# Patient Record
Sex: FEMALE | Race: WHITE | Employment: FULL TIME | URBAN - METROPOLITAN AREA
[De-identification: names, ages, dates, MRNs, and addresses within clinical notes are randomized per-mention and may not be internally consistent; named-entity substitution may affect disease eponyms.]

---

## 2017-07-17 ENCOUNTER — OFFICE VISIT (OUTPATIENT)
Dept: FAMILY MEDICINE | Facility: CLINIC | Age: 22
End: 2017-07-17
Payer: MEDICAID

## 2017-07-17 VITALS
HEART RATE: 75 BPM | HEIGHT: 66 IN | BODY MASS INDEX: 27.97 KG/M2 | WEIGHT: 174 LBS | SYSTOLIC BLOOD PRESSURE: 123 MMHG | OXYGEN SATURATION: 98 % | TEMPERATURE: 98.6 F | DIASTOLIC BLOOD PRESSURE: 77 MMHG

## 2017-07-17 DIAGNOSIS — R11.0 NAUSEA: ICD-10-CM

## 2017-07-17 DIAGNOSIS — N76.0 BACTERIAL VAGINAL INFECTION: Primary | ICD-10-CM

## 2017-07-17 DIAGNOSIS — B96.89 BACTERIAL VAGINAL INFECTION: Primary | ICD-10-CM

## 2017-07-17 DIAGNOSIS — M54.50 ACUTE BILATERAL LOW BACK PAIN WITHOUT SCIATICA: ICD-10-CM

## 2017-07-17 DIAGNOSIS — N97.9 FEMALE INFERTILITY: ICD-10-CM

## 2017-07-17 LAB
ALBUMIN UR-MCNC: NEGATIVE MG/DL
AMORPH CRY #/AREA URNS HPF: ABNORMAL /HPF
APPEARANCE UR: ABNORMAL
BACTERIA #/AREA URNS HPF: ABNORMAL /HPF
BETA HCG QUAL IFA URINE: NEGATIVE
BILIRUB UR QL STRIP: NEGATIVE
COLOR UR AUTO: YELLOW
GLUCOSE UR STRIP-MCNC: NEGATIVE MG/DL
HGB UR QL STRIP: NEGATIVE
KETONES UR STRIP-MCNC: NEGATIVE MG/DL
LEUKOCYTE ESTERASE UR QL STRIP: NEGATIVE
MICRO REPORT STATUS: ABNORMAL
NITRATE UR QL: NEGATIVE
NON-SQ EPI CELLS #/AREA URNS LPF: ABNORMAL /LPF
PH UR STRIP: 7.5 PH (ref 5–7)
RBC #/AREA URNS AUTO: ABNORMAL /HPF (ref 0–2)
SP GR UR STRIP: 1.02 (ref 1–1.03)
SPECIMEN SOURCE: ABNORMAL
URN SPEC COLLECT METH UR: ABNORMAL
UROBILINOGEN UR STRIP-ACNC: 0.2 EU/DL (ref 0.2–1)
WBC #/AREA URNS AUTO: ABNORMAL /HPF (ref 0–2)
WET PREP SPEC: ABNORMAL

## 2017-07-17 PROCEDURE — 84703 CHORIONIC GONADOTROPIN ASSAY: CPT | Performed by: FAMILY MEDICINE

## 2017-07-17 PROCEDURE — 81001 URINALYSIS AUTO W/SCOPE: CPT | Performed by: FAMILY MEDICINE

## 2017-07-17 PROCEDURE — 87210 SMEAR WET MOUNT SALINE/INK: CPT | Performed by: FAMILY MEDICINE

## 2017-07-17 PROCEDURE — 99213 OFFICE O/P EST LOW 20 MIN: CPT | Performed by: FAMILY MEDICINE

## 2017-07-17 RX ORDER — METRONIDAZOLE 500 MG/1
500 TABLET ORAL 2 TIMES DAILY
Qty: 14 TABLET | Refills: 0 | Status: SHIPPED | OUTPATIENT
Start: 2017-07-17 | End: 2017-09-06

## 2017-07-17 NOTE — PROGRESS NOTES
Subjective: Patient and her partner have been trying to get pregnant, last period was May 12, possibility that she has polycystic ovary problems, last see gynecology to sort that out, wanted to know if she might be pregnant today but she also has a little nausea in the morning, some low back pain, and a vaginal discharge.    Objective: Abdominal exam is normal. No CVAT. Urine pregnancy test is negative and urine looks normal but wet prep is positive for clue cells.    Assessment and plan: Bacterial vaginosis, not sure if it's causing any of the other symptoms but they seem pretty minimal at this point. She is to see gynecology about the infertility issues. She also needs to see a regular practitioner about restarting her ADD medicine.

## 2017-07-17 NOTE — MR AVS SNAPSHOT
After Visit Summary   7/17/2017    Mi Salgado    MRN: [id number]           Patient Information     Date Of Birth          1995        Visit Information        Provider Department      7/17/2017 3:45 PM Guille Garcia MD [facility]        Today's Diagnoses     Bacterial vaginal infection    -  1    Nausea        Acute bilateral low back pain without sciatica        Female infertility           Follow-ups after your visit        Additional Services     OB/GYN REFERRAL       Your provider has referred you to:  [facility]: [facility] [phone]   [email or web address]    Please be aware that coverage of these services is subject to the terms and limitations of your health insurance plan.  Call member services at your health plan with any benefit or coverage questions.      Please bring the following with you to your appointment:    (1) Any X-Rays, CTs or MRIs which have been performed.  Contact the facility where they were done to arrange for  prior to your scheduled appointment.   (2) List of current medications   (3) This referral request   (4) Any documents/labs given to you for this referral                  Your next 10 appointments already scheduled     Aug 21, 2017 10:30 AM CDT   Office Visit with BABITA Abarca CNP   [facility]    [address] [address] [phone]           Bring a current list of meds and any records pertaining to this visit.  For Physicals, please bring immunization records and any forms needing to be filled out.  Please arrive 10 minutes early to complete paperwork.              Who to contact     If you have questions or need follow up information about today's clinic visit or your schedule please contact [facility] directly at [phone].  Normal or non-critical lab and imaging  "results will be communicated to you by MyChart, letter or phone within 4 business days after the clinic has received the results. If you do not hear from us within 7 days, please contact the clinic through [employer] or phone. If you have a critical or abnormal lab result, we will notify you by phone as soon as possible.  Submit refill requests through [employer] or call your pharmacy and they will forward the refill request to us. Please allow 3 business days for your refill to be completed.          Additional Information About Your Visit        [employer] Information     [employer] gives you secure access to your electronic health record. If you see a primary care provider, you can also send messages to your care team and make appointments. If you have questions, please call your primary care clinic.  If you do not have a primary care provider, please call [phone] and they will assist you.        Care EveryWhere ID     This is your Care EveryWhere ID. This could be used by other organizations to access your [facility] medical records  [id number]        Your Vitals Were     Pulse Temperature Height Last Period Pulse Oximetry BMI (Body Mass Index)    75 98.6  F (37  C) (Tympanic) 5' 6\" (1.676 m) 05/12/2017 98% 28.08 kg/m2       Blood Pressure from Last 3 Encounters:   07/17/17 123/77   08/19/15 112/73   08/18/15 123/80    Weight from Last 3 Encounters:   07/17/17 174 lb (78.9 kg)   08/19/15 193 lb (87.5 kg) (97 %)*   08/18/15 190 lb (86.2 kg) (96 %)*     * Growth percentiles are based on CDC 2-20 Years data.              We Performed the Following     *UA reflex to Microscopic and Culture ([place] [facility] (except [place] and [place])     Beta HCG qual IFA urine     OB/GYN REFERRAL     Urine Microscopic     Wet prep          Today's Medication Changes          These changes are accurate as of: 7/17/17  4:44 PM.  If you have any questions, ask your nurse or doctor.               Start taking these medicines.  "       Dose/Directions    metroNIDAZOLE 500 MG tablet   Commonly known as:  FLAGYL   Used for:  Bacterial vaginal infection   Started by:  Guille Garcia MD        Dose:  500 mg   Take 1 tablet (500 mg) by mouth 2 times daily   Quantity:  14 tablet   Refills:  0            Where to get your medicines      These medications were sent to [id number] - [place], [place] - [address]  [address], [facility] [address]     Phone:  [phone]     metroNIDAZOLE 500 MG tablet                Primary Care Provider Office Phone # Fax #    BABITA Abarca [phone]       [facility] [address] [address] [address]        Equal Access to Services     VIRGILIO MAHER : Cony brunoo Soreshma, waaxda luqadaha, qaybta kaalmada aderadhayada, anna marie jensen. So [facility] [phone].    ATENCIÓN: Si habla español, tiene a guillory disposición servicios gratuitos de asistencia lingüística. Llame al [phone].    We comply with applicable federal civil rights laws and [place] laws. We do not discriminate on the basis of race, color, national origin, age, disability sex, sexual orientation or gender identity.            Thank you!     Thank you for choosing [facility]  for your care. Our goal is always to provide you with excellent care. Hearing back from our patients is one way we can continue to improve our services. Please take a few minutes to complete the written survey that you may receive in the mail after your visit with us. Thank you!             Your Updated Medication List - Protect others around you: Learn how to safely use, store and throw away your medicines at www.disposemymeds.org.          This list is accurate as of: 7/17/17  4:44 PM.  Always use your most recent med list.                   Brand Name Dispense Instructions for use Diagnosis    metroNIDAZOLE 500 MG tablet    FLAGYL    14  tablet    Take 1 tablet (500 mg) by mouth 2 times daily    Bacterial vaginal infection

## 2017-09-06 ENCOUNTER — OFFICE VISIT (OUTPATIENT)
Dept: OBGYN | Facility: CLINIC | Age: 22
End: 2017-09-06
Payer: COMMERCIAL

## 2017-09-06 VITALS
WEIGHT: 204 LBS | HEART RATE: 92 BPM | BODY MASS INDEX: 32.93 KG/M2 | DIASTOLIC BLOOD PRESSURE: 70 MMHG | SYSTOLIC BLOOD PRESSURE: 124 MMHG

## 2017-09-06 DIAGNOSIS — N97.0 ANOVULATION: Primary | ICD-10-CM

## 2017-09-06 PROCEDURE — 99214 OFFICE O/P EST MOD 30 MIN: CPT | Performed by: OBSTETRICS & GYNECOLOGY

## 2017-09-06 RX ORDER — CLOMIPHENE CITRATE 50 MG/1
TABLET ORAL
Qty: 5 TABLET | Refills: 6 | Status: SHIPPED | OUTPATIENT
Start: 2017-09-06 | End: 2018-08-27

## 2017-09-06 RX ORDER — BUPROPION HYDROCHLORIDE 150 MG/1
150 TABLET, EXTENDED RELEASE ORAL
COMMUNITY
Start: 2015-04-29 | End: 2017-12-18

## 2017-09-06 NOTE — MR AVS SNAPSHOT
After Visit Summary   9/6/2017    Mi Salgado    MRN: [id number]           Patient Information     Date Of Birth          1995        Visit Information        Provider Department      9/6/2017 2:30 PM Kevin Cagle MD [facility]        Today's Diagnoses     Anovulation    -  1       Follow-ups after your visit        Future tests that were ordered for you today     Open Future Orders        Priority Expected Expires Ordered    US OB > 14 Weeks Complete Single Routine  9/6/2018 9/6/2017            Who to contact     If you have questions or need follow up information about today's clinic visit or your schedule please contact [facility] directly at [phone].  Normal or non-critical lab and imaging results will be communicated to you by MyChart, letter or phone within 4 business days after the clinic has received the results. If you do not hear from us within 7 days, please contact the clinic through [employer] or phone. If you have a critical or abnormal lab result, we will notify you by phone as soon as possible.  Submit refill requests through [employer] or call your pharmacy and they will forward the refill request to us. Please allow 3 business days for your refill to be completed.          Additional Information About Your Visit        MyChart Information     [employer] gives you secure access to your electronic health record. If you see a primary care provider, you can also send messages to your care team and make appointments. If you have questions, please call your primary care clinic.  If you do not have a primary care provider, please call [phone] and they will assist you.        Care EveryWhere ID     This is your Care EveryWhere ID. This could be used by other organizations to access your [facility] medical records  [id number]        Your Vitals Were     Pulse Last Period BMI (Body Mass Index)             92 08/12/2017 (Exact Date) 32.93 kg/m2           Blood Pressure from Last 3 Encounters:   09/06/17 124/70   07/17/17 123/77   08/19/15 112/73    Weight from Last 3 Encounters:   09/06/17 204 lb (92.5 kg)   07/17/17 174 lb (78.9 kg)   08/19/15 193 lb (87.5 kg) (97 %)*     * Growth percentiles are based on [facility][note 3] 2-20 Years data.              We Performed the Following     Anti-Mullerian hormone     Prolactin     TSH with free T4 reflex          Today's Medication Changes          These changes are accurate as of: 9/6/17  3:45 PM.  If you have any questions, ask your nurse or doctor.               Start taking these medicines.        Dose/Directions    clomiPHENE 50 MG tablet   Commonly known as:  CLOMID   Used for:  Anovulation   Started by:  Kevin Cagle MD        Take one pill a day; day 5-9 of cycle   Quantity:  5 tablet   Refills:  6            Where to get your medicines      These medications were sent to [facility] [address] [place] [address] & [address] [address]    Hours:  24-hours Phone:  [phone]     clomiPHENE 50 MG tablet                Primary Care Provider Office Phone # Fax #    Angelita Ornelas FlacoterriBABITA [facility] [phone] [address] [address] [address]        Equal Access to Services     VIRGILIO MAHER AH: Hadii darlene brandt hadasho Soomaali, waaxda luqadaha, qaybta kaalmada adeegyada, anna marie noriega . So [facility] [phone].    ATENCIÓN: Si habla español, tiene a guillory disposición servicios gratuitos de asistencia lingüística. Llame al [phone].    We comply with applicable federal civil rights laws and [place] laws. We do not discriminate on the basis of race, color, national origin, age, disability sex, sexual orientation or gender identity.            Thank you!     Thank you for choosing [facility]  for your care. Our goal is always to provide you with excellent care. Hearing back from our  patients is one way we can continue to improve our services. Please take a few minutes to complete the written survey that you may receive in the mail after your visit with us. Thank you!             Your Updated Medication List - Protect others around you: Learn how to safely use, store and throw away your medicines at www.disposemymeds.org.          This list is accurate as of: 9/6/17  3:45 PM.  Always use your most recent med list.                   Brand Name Dispense Instructions for use Diagnosis    buPROPion 150 MG 12 hr tablet    WELLBUTRIN SR     Take 150 mg by mouth        clomiPHENE 50 MG tablet    CLOMID    5 tablet    Take one pill a day; day 5-9 of cycle    Anovulation

## 2017-09-06 NOTE — PROGRESS NOTES
SUBJECTIVE:  Mi Salgado is an 22 year old  P0 woman who presents for discussion of becoming pregnant      -menses not regular; 36-50 days      -never regular      -no history of PID, GC, chlamydia      -no history of pelvic surgery      -no breast secretions        Significant other; achieved pregnancy with other partner      -no semen analysis/similar age to patient    Attempting pregnancy for two years; no positive ovulation test    Past Medical History:   Diagnosis Date     Genital HSV     [facility]     Oppositional defiant disorder of childhood or adolescence      Past Surgical History:   Procedure Laterality Date     NO HISTORY OF SURGERY       Current Outpatient Prescriptions   Medication     buPROPion (WELLBUTRIN SR) 150 MG 12 hr tablet     clomiPHENE (CLOMID) 50 MG tablet     No current facility-administered medications for this visit.      No Known Allergies  Social History   Substance Use Topics     Smoking status: Current Every Day Smoker     Packs/day: 0.25     Types: Cigarettes     Last attempt to quit: 3/16/2011     Smokeless tobacco: Never Used     Alcohol use No       Review of Systems  CONSTITUTIONAL:NEGATIVE  EYES: NEGATIVE  ENT/MOUTH: NEGATIVE  RESP: NEGATIVE  CV: NEGATIVE  GI: NEGATIVE  : see above    OBJECTIVE:  /70 (BP Location: Right arm, Patient Position: Chair, Cuff Size: Adult Regular)  Pulse 92  Wt 204 lb (92.5 kg)  LMP 2017 (Exact Date)  BMI 32.93 kg/m2   EXAM:  GENERAL APPEARANCE: healthy, alert and no distress  ABDOMEN: soft, nontender, without hepatosplenomegaly or masses        ASSESSMENT:  Primary infertility; likely anovulation     -recommend thyroid, prolactin and anti-Mullerian hormone     -recommend ultrasound  If above normal then begin Clomid     -use, risks (ovarian hyperstimulation, multiples risk doubled) reviewed  Differential diagnosis of infertility reviewed; including ovulatory dysfunction,  tubal factor, male factor      30 minutes spent reviewing above    PLAN:  (N97.0) Anovulation  (primary encounter diagnosis)  Plan: clomiPHENE (CLOMID) 50 MG tablet, TSH with free        T4 reflex, Prolactin, Anti-Mullerian hormone,         US OB > 14 Weeks Complete Single               Health Maintenance   Topic Date Due     CHLAMYDIA SCREENING  08/10/2016     PAP SCREENING Q3 YR (SYSTEM ASSIGNED)  06/28/2017     INFLUENZA VACCINE (SYSTEM ASSIGNED)  09/01/2017     TETANUS IMMUNIZATION (SYSTEM ASSIGNED)  08/12/2018     PEDS DTAP/TDAP (7 - Td) 08/12/2018     HPV IMMUNIZATION  Completed

## 2017-09-06 NOTE — NURSING NOTE
"Chief Complaint   Patient presents with     Consult     trying to conceive x's 2 years - irregular menses       Initial /70 (BP Location: Right arm, Patient Position: Chair, Cuff Size: Adult Regular)  Pulse 92  Wt 204 lb (92.5 kg)  LMP 08/12/2017 (Exact Date)  BMI 32.93 kg/m2 Estimated body mass index is 32.93 kg/(m^2) as calculated from the following:    Height as of 7/17/17: 5' 6\" (1.676 m).    Weight as of this encounter: 204 lb (92.5 kg).  Medication Reconciliation: complete     Nurse assisted visit.  Mishel Guzman MA.      "

## 2017-09-07 DIAGNOSIS — N97.0 ANOVULATION: ICD-10-CM

## 2017-09-07 PROCEDURE — 84443 ASSAY THYROID STIM HORMONE: CPT | Performed by: OBSTETRICS & GYNECOLOGY

## 2017-09-07 PROCEDURE — 83520 IMMUNOASSAY QUANT NOS NONAB: CPT | Mod: 90 | Performed by: OBSTETRICS & GYNECOLOGY

## 2017-09-07 PROCEDURE — 99000 SPECIMEN HANDLING OFFICE-LAB: CPT | Performed by: OBSTETRICS & GYNECOLOGY

## 2017-09-07 PROCEDURE — 36415 COLL VENOUS BLD VENIPUNCTURE: CPT | Performed by: OBSTETRICS & GYNECOLOGY

## 2017-09-07 PROCEDURE — 84146 ASSAY OF PROLACTIN: CPT | Performed by: OBSTETRICS & GYNECOLOGY

## 2017-09-08 LAB
PROLACTIN SERPL-MCNC: 22 UG/L (ref 3–27)
TSH SERPL DL<=0.005 MIU/L-ACNC: 0.78 MU/L (ref 0.4–4)

## 2017-09-10 ENCOUNTER — MYC MEDICAL ADVICE (OUTPATIENT)
Dept: OBGYN | Facility: CLINIC | Age: 22
End: 2017-09-10

## 2017-09-11 LAB — MIS SERPL-MCNC: 7.09 NG/ML (ref 0.4–16.02)

## 2017-09-11 NOTE — TELEPHONE ENCOUNTER
Pt called to schedule pelvic u/s.  Does pt need ultrasound on certain day?  Scheduled for Friday Sept 22 at 330 (cycle day 14).    Ani Kimbrough R.N.  [facility]

## 2017-09-25 ENCOUNTER — MYC MEDICAL ADVICE (OUTPATIENT)
Dept: OBGYN | Facility: CLINIC | Age: 22
End: 2017-09-25

## 2017-09-26 ENCOUNTER — HOSPITAL ENCOUNTER (OUTPATIENT)
Dept: ULTRASOUND IMAGING | Facility: CLINIC | Age: 22
Discharge: HOME | End: 2017-09-26
Attending: OBSTETRICS & GYNECOLOGY | Admitting: OBSTETRICS & GYNECOLOGY
Payer: COMMERCIAL

## 2017-09-26 DIAGNOSIS — N97.0 ANOVULATION: ICD-10-CM

## 2017-09-26 PROCEDURE — 76830 TRANSVAGINAL US NON-OB: CPT

## 2017-09-26 NOTE — TELEPHONE ENCOUNTER
Called pt, no answer.    Pt calls back, relay MD would like US tomorrow. Pt unable to scheduled RI US availability tomorrow at 1:15 d/t work. 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 Gave radiology scheduling phone number to call.

## 2017-09-27 ENCOUNTER — MYC MEDICAL ADVICE (OUTPATIENT)
Dept: OBGYN | Facility: CLINIC | Age: 22
End: 2017-09-27

## 2017-09-27 PROBLEM — E28.2 PCOS (POLYCYSTIC OVARIAN SYNDROME): Status: ACTIVE | Noted: 2017-09-27

## 2017-09-27 NOTE — TELEPHONE ENCOUNTER
Spoke with patient and ultrasound results explained.  If continues to have questions regarding ultrasound and recommendations recommend a follow up appt/Dr Cagle.  Michelle Guy RN

## 2017-10-09 ENCOUNTER — MYC MEDICAL ADVICE (OUTPATIENT)
Dept: OBGYN | Facility: CLINIC | Age: 22
End: 2017-10-09

## 2017-10-29 ENCOUNTER — HEALTH MAINTENANCE LETTER (OUTPATIENT)
Age: 22
End: 2017-10-29

## 2017-11-10 ENCOUNTER — MYC MEDICAL ADVICE (OUTPATIENT)
Dept: OBGYN | Facility: CLINIC | Age: 22
End: 2017-11-10

## 2017-11-10 DIAGNOSIS — E28.2 PCOS (POLYCYSTIC OVARIAN SYNDROME): Primary | ICD-10-CM

## 2017-11-10 RX ORDER — CLOMIPHENE CITRATE 50 MG/1
100 TABLET ORAL DAILY
Qty: 10 TABLET | Refills: 6 | Status: SHIPPED | OUTPATIENT
Start: 2017-11-10 | End: 2018-08-27

## 2017-12-13 ENCOUNTER — MYC MEDICAL ADVICE (OUTPATIENT)
Dept: PEDIATRICS | Facility: CLINIC | Age: 22
End: 2017-12-13

## 2017-12-18 ENCOUNTER — OFFICE VISIT (OUTPATIENT)
Dept: PEDIATRICS | Facility: CLINIC | Age: 22
End: 2017-12-18
Payer: COMMERCIAL

## 2017-12-18 VITALS
HEIGHT: 67 IN | HEART RATE: 76 BPM | TEMPERATURE: 98 F | BODY MASS INDEX: 33.27 KG/M2 | SYSTOLIC BLOOD PRESSURE: 127 MMHG | DIASTOLIC BLOOD PRESSURE: 78 MMHG | WEIGHT: 212 LBS | OXYGEN SATURATION: 98 %

## 2017-12-18 DIAGNOSIS — F41.9 ANXIETY: ICD-10-CM

## 2017-12-18 DIAGNOSIS — F33.2 SEVERE EPISODE OF RECURRENT MAJOR DEPRESSIVE DISORDER, WITHOUT PSYCHOTIC FEATURES (H): ICD-10-CM

## 2017-12-18 DIAGNOSIS — F90.0 ATTENTION DEFICIT HYPERACTIVITY DISORDER (ADHD), PREDOMINANTLY INATTENTIVE TYPE: Primary | ICD-10-CM

## 2017-12-18 PROBLEM — E78.5 HYPERLIPIDEMIA WITH TARGET LDL LESS THAN 160: Status: ACTIVE | Noted: 2017-12-18

## 2017-12-18 PROCEDURE — 99214 OFFICE O/P EST MOD 30 MIN: CPT | Performed by: NURSE PRACTITIONER

## 2017-12-18 RX ORDER — LISDEXAMFETAMINE DIMESYLATE 50 MG/1
50 CAPSULE ORAL EVERY MORNING
Qty: 30 CAPSULE | Refills: 0 | Status: SHIPPED | OUTPATIENT
Start: 2017-12-18 | End: 2018-05-09

## 2017-12-18 RX ORDER — ESCITALOPRAM OXALATE 10 MG/1
10 TABLET ORAL DAILY
Qty: 30 TABLET | Refills: 1 | Status: SHIPPED | OUTPATIENT
Start: 2017-12-18 | End: 2018-08-27

## 2017-12-18 ASSESSMENT — ANXIETY QUESTIONNAIRES
3. WORRYING TOO MUCH ABOUT DIFFERENT THINGS: NEARLY EVERY DAY
5. BEING SO RESTLESS THAT IT IS HARD TO SIT STILL: MORE THAN HALF THE DAYS
2. NOT BEING ABLE TO STOP OR CONTROL WORRYING: NEARLY EVERY DAY
GAD7 TOTAL SCORE: 18
6. BECOMING EASILY ANNOYED OR IRRITABLE: NEARLY EVERY DAY
1. FEELING NERVOUS, ANXIOUS, OR ON EDGE: NEARLY EVERY DAY
7. FEELING AFRAID AS IF SOMETHING AWFUL MIGHT HAPPEN: NEARLY EVERY DAY

## 2017-12-18 ASSESSMENT — PATIENT HEALTH QUESTIONNAIRE - PHQ9
SUM OF ALL RESPONSES TO PHQ QUESTIONS 1-9: 15
5. POOR APPETITE OR OVEREATING: SEVERAL DAYS

## 2017-12-18 NOTE — NURSING NOTE
"Chief Complaint   Patient presents with     Recheck Medication       Initial /78  Pulse 76  Temp 98  F (36.7  C) (Oral)  Ht 5' 7\" (1.702 m)  Wt 212 lb (96.2 kg)  SpO2 98%  BMI 33.2 kg/m2 Estimated body mass index is 33.2 kg/(m^2) as calculated from the following:    Height as of this encounter: 5' 7\" (1.702 m).    Weight as of this encounter: 212 lb (96.2 kg).  Medication Reconciliation: complete    Cassandra Chacon MA  "

## 2017-12-18 NOTE — LETTER
December 18, 2017      Mi Salgado  [address] [place] [address]        To Whom It May Concern:    Mi Salgado was seen in our clinic. She may return to work without restrictions.      Sincerely,        Angelita Goldstein, SIDDHARTHA, APRN CNP

## 2017-12-18 NOTE — MR AVS SNAPSHOT
After Visit Summary   12/18/2017    Mi Salgado    MRN: [id number]           Patient Information     Date Of Birth          1995        Visit Information        Provider Department      12/18/2017 2:50 PM Angelita Goldstein APRN CNP [facility]        Today's Diagnoses     Attention deficit hyperactivity disorder (ADHD), predominantly inattentive type    -  1    Anxiety        Moderate major depression (H)          Care Instructions    [facility]- Pediatric Department    If you have any questions regarding to your visit please contact:   Team Isatu:   Clinic Hours Telephone Number   BABITA Arreguin, HEIDI Blunt PA-C, MS    Em Mathews, ZULEIKA Ji,    7am - 7pm Mon - Thurs 7am - 5pm Fri [phone]    After hours and weekends, call [phone]   To make an appointment at any location anytime, please call [phone] or  [email or web address].   Pediatric Walk-in Clinic* 8:30am - 3pm  Mon- Fri    [facility]   8:00am - 7pm  Mon- Thurs  8:00am - 5:30 pm Friday  9am - 1pm Saturday [phone]   Urgent Care - [facility]       11pm-9pm Monday - Friday   9am-5pm Saturday - Sunday    5pm-9pm Monday - Friday  9am-5pm Saturday - Sunday [phone] - [place]      [phone] - [place]   *Pediatric Walk-In Clinic is available for children/adolescents age 0-21 for the following symptoms:  Cough/Cold symptoms   Rashes/Itchy Skin  Sore throat    Urinary tract infection  Diarrhea    Ringworm  Ear pain    Sinus infection  Fever     Pink eye       If your provider has ordered a CT, MRI, or ultrasound for you, please call to schedule:  [place] [facility], phone [phone], fax [phone]  [facility] radiology, [phone]    If you need a medication refill please contact your pharmacy.   Please allow 3  "business days for your refills to be completed.  **For ADHD medication, patient will need a follow up clinic or Evisit at least every 3 months to obtain refills.**    Use [employer] (secure email communication and access to your chart) to send your primary care provider a message or make an appointment.  Ask someone on your Team how to sign up for [employer] or call the [employer] help line at [phone]  To view your child's test results online: Log into your own [employer] account, select your child's name from the tabs on the right hand side, select \"My medical record\" and select \"Test results\"  Do you have options for a visit without coming into the clinic?  [employer] offers electronic visits (E-visits) and telephone visits for certain medical concerns as well as Zipnosis online.    E-visits via [employer]- generally incur a $35.00 fee.   Telephone visits- These are billed based on time spent (in 10-minute increments) on the phone with your provider.   5-10 minutes $30.00 fee   11-20 minutes $59.00 fee   21-30 minutes $85.00 fee  Zipnosis- $25.00 fee.  More information and link available on [employer] homepage.       Lisdexamfetamine Oral Capsule  Brand Name: Vyvanse  What is this medicine?  LISDEXAMFETAMINE (lis DEX am fet a meen) is used to treat attention-deficit hyperactivity disorder (ADHD) in adults and children. It is also used to treat binge-eating disorder in adults. Federal law prohibits giving this medicine to any person other than the person for whom it was prescribed. Do not share this medicine with anyone else.  How should I use this medicine?  Take this medicine by mouth. Follow the directions on the prescription label. Swallow the capsules with a drink of water. You may open capsule and add to a glass of water, then drink right away. Take your doses at regular intervals. Do not take your medicine more often than directed. Do not suddenly stop your medicine. You must gradually reduce the dose or you may " feel withdrawal effects. Ask your doctor or health care professional for advice.  A special MedGuide will be given to you by the pharmacist with each prescription and refill. Be sure to read this information carefully each time.  Talk to your pediatrician regarding the use of this medicine in children. While this drug may be prescribed for children as young as 6 years of age for selected conditions, precautions do apply.  What side effects may I notice from receiving this medicine?  Side effects that you should report to your doctor or health care professional as soon as possible:    allergic reactions like skin rash, itching or hives, swelling of the face, lips, or tongue    changes in vision    chest pain or chest tightness    confusion, trouble speaking or understanding    fast, irregular heartbeat    fingers or toes feel numb, cool, painful    hallucination, loss of contact with reality    high blood pressure    males: prolonged or painful erection    seizures    severe headaches    shortness of breath    suicidal thoughts or other mood changes    trouble walking, dizziness, loss of balance or coordination    uncontrollable head, mouth, neck, arm, or leg movements  Side effects that usually do not require medical attention (report to your doctor or health care professional if they continue or are bothersome):    anxious    headache    loss of appetite    nausea, vomiting    trouble sleeping    weight loss  What may interact with this medicine?  Do not take this medicine with any of the following medications:    MAOIs like Carbex, Eldepryl, Marplan, Nardil, and Parnate    other stimulant medicines for attention disorders, weight loss, or to stay awake  This medicine may also interact with the following medications:    acetazolamide    ammonium chloride    antacids    ascorbic acid    atomoxetine    caffeine    certain medicines for blood pressure    certain medicines for depression, anxiety, or psychotic  disturbances    certain medicines for seizures like carbamazepine, phenobarbital, phenytoin    certain medicines for stomach problems like cimetidine, famotidine, omeprazole, lansoprazole    cold or allergy medicines    green tea    levodopa    linezolid    medicines for sleep during surgery    methenamine    norepinephrine    phenothiazines like chlorpromazine, mesoridazine, prochlorperazine, thioridazine    propoxyphene    sodium acid phosphate    sodium bicarbonate  What if I miss a dose?  If you miss a dose, take it as soon as you can. If it is almost time for your next dose, take only that dose. Do not take double or extra doses.  Where should I keep my medicine?  Keep out of the reach of children. This medicine can be abused. Keep your medicine in a safe place to protect it from theft. Do not share this medicine with anyone. Selling or giving away this medicine is dangerous and against the law.  Store at room temperature between 15 and 30 degrees C (59 and 86 degrees F). Protect from light. Keep container tightly closed. Throw away any unused medicine after the expiration date.  What should I tell my health care provider before I take this medicine?  They need to know if you have any of these conditions:    anxiety or panic attacks    circulation problems in fingers and toes    glaucoma    hardening or blockages of the arteries or heart blood vessels    heart disease or a heart defect    high blood pressure    history of a drug or alcohol abuse problem    history of stroke    kidney disease    liver disease    mental illness    seizures    suicidal thoughts, plans, or attempt; a previous suicide attempt by you or a family member    thyroid disease    Tourette's syndrome    an unusual or allergic reaction to lisdexamfetamine, other medicines, foods, dyes, or preservatives    pregnant or trying to get pregnant    breast-feeding  What should I watch for while using this medicine?  Visit your doctor for regular  check ups. This prescription requires that you follow special procedures with your doctor and pharmacy. You will need to have a new written prescription from your doctor every time you need a refill.  This medicine may affect your concentration, or hide signs of tiredness. Until you know how this medicine affects you, do not drive, ride a bicycle, use machinery, or do anything that needs mental alertness.  Tell your doctor or health care professional if this medicine loses its effects, or if you feel you need to take more than the prescribed amount. Do not change your dose without talking to your doctor or health care professional.  Decreased appetite is a common side effect when starting this medicine. Eating small, frequent meals or snacks can help. Talk to your doctor if you continue to have poor eating habits. Height and weight growth of a child taking this medicine will be monitored closely.  Do not take this medicine close to bedtime. It may prevent you from sleeping.  If you are going to need surgery, a MRI, CT scan, or other procedure, tell your doctor that you are taking this medicine. You may need to stop taking this medicine before the procedure.  Tell your doctor or healthcare professional right away if you notice unexplained wounds on your fingers and toes while taking this medicine. You should also tell your healthcare provider if you experience numbness or pain, changes in the skin color, or sensitivity to temperature in your fingers or toes.  NOTE:This sheet is a summary. It may not cover all possible information. If you have questions about this medicine, talk to your doctor, pharmacist, or health care provider. Copyright  2017 [employer]        Patient Education    Escitalopram Oral solution    Escitalopram Oral tablet  Escitalopram Oral tablet  What is this medicine?  ESCITALOPRAM (es sye DULCE oh pram) is used to treat depression and certain types of anxiety.  This medicine may be used for other  purposes; ask your health care provider or pharmacist if you have questions.  What should I tell my health care provider before I take this medicine?  They need to know if you have any of these conditions:    bipolar disorder or a family history of bipolar disorder    diabetes    glaucoma    heart disease    kidney or liver disease    receiving electroconvulsive therapy    seizures (convulsions)    suicidal thoughts, plans, or attempt by you or a family member    an unusual or allergic reaction to escitalopram, the related drug citalopram, other medicines, foods, dyes, or preservatives    pregnant or trying to become pregnant    breast-feeding  How should I use this medicine?  Take this medicine by mouth with a glass of water. Follow the directions on the prescription label. You can take it with or without food. If it upsets your stomach, take it with food. Take your medicine at regular intervals. Do not take it more often than directed. Do not stop taking this medicine suddenly except upon the advice of your doctor. Stopping this medicine too quickly may cause serious side effects or your condition may worsen.  A special MedGuide will be given to you by the pharmacist with each prescription and refill. Be sure to read this information carefully each time.  Talk to your pediatrician regarding the use of this medicine in children. Special care may be needed.  Overdosage: If you think you have taken too much of this medicine contact a poison control center or emergency room at once.  NOTE: This medicine is only for you. Do not share this medicine with others.  What if I miss a dose?  If you miss a dose, take it as soon as you can. If it is almost time for your next dose, take only that dose. Do not take double or extra doses.  What may interact with this medicine?  Do not take this medicine with any of the following medications:    certain medicines for fungal infections like fluconazole, itraconazole, ketoconazole,  posaconazole, voriconazole    cisapride    citalopram    dofetilide    dronedarone    linezolid    MAOIs like Carbex, Eldepryl, Marplan, Nardil, and Parnate    methylene blue (injected into a vein)    pimozide    thioridazine    ziprasidone  This medicine may also interact with the following medications:    alcohol    aspirin and aspirin-like medicines    carbamazepine    certain medicines for depression, anxiety, or psychotic disturbances    certain medicines for migraine headache like almotriptan, eletriptan, frovatriptan, naratriptan, rizatriptan, sumatriptan, zolmitriptan    certain medicines for sleep    certain medicines that treat or prevent blood clots like warfarin, enoxaparin, dalteparin    cimetidine    diuretics    fentanyl    furazolidone    isoniazid    lithium    metoprolol    NSAIDs, medicines for pain and inflammation, like ibuprofen or naproxen    other medicines that prolong the QT interval (cause an abnormal heart rhythm)    procarbazine    rasagiline    supplements like Verndale's wort, kava kava, valerian    tramadol    tryptophan  This list may not describe all possible interactions. Give your health care provider a list of all the medicines, herbs, non-prescription drugs, or dietary supplements you use. Also tell them if you smoke, drink alcohol, or use illegal drugs. Some items may interact with your medicine.  What should I watch for while using this medicine?  Tell your doctor if your symptoms do not get better or if they get worse. Visit your doctor or health care professional for regular checks on your progress. Because it may take several weeks to see the full effects of this medicine, it is important to continue your treatment as prescribed by your doctor.  Patients and their families should watch out for new or worsening thoughts of suicide or depression. Also watch out for sudden changes in feelings such as feeling anxious, agitated, panicky, irritable, hostile, aggressive,  impulsive, severely restless, overly excited and hyperactive, or not being able to sleep. If this happens, especially at the beginning of treatment or after a change in dose, call your health care professional.  You may get drowsy or dizzy. Do not drive, use machinery, or do anything that needs mental alertness until you know how this medicine affects you. Do not stand or sit up quickly, especially if you are an older patient. This reduces the risk of dizzy or fainting spells. Alcohol may interfere with the effect of this medicine. Avoid alcoholic drinks.  Your mouth may get dry. Chewing sugarless gum or sucking hard candy, and drinking plenty of water may help. Contact your doctor if the problem does not go away or is severe.  What side effects may I notice from receiving this medicine?  Side effects that you should report to your doctor or health care professional as soon as possible:    allergic reactions like skin rash, itching or hives, swelling of the face, lips, or tongue    confusion    feeling faint or lightheaded, falls    fast talking and excited feelings or actions that are out of control    hallucination, loss of contact with reality    seizures    suicidal thoughts or other mood changes    unusual bleeding or bruising  Side effects that usually do not require medical attention (report to your doctor or health care professional if they continue or are bothersome):    blurred vision    changes in appetite    change in sex drive or performance    headache    increased sweating    nausea  This list may not describe all possible side effects. Call your doctor for medical advice about side effects. You may report side effects to FDA at 9-516-FDA-8511.  Where should I keep my medicine?  Keep out of reach of children.  Store at room temperature between 15 and 30 degrees C (59 and 86 degrees F). Throw away any unused medicine after the expiration date.  NOTE:This sheet is a summary. It may not cover all  "possible information. If you have questions about this medicine, talk to your doctor, pharmacist, or health care provider. Copyright  2016 [employer]                Follow-ups after your visit        Your next 10 appointments already scheduled     Dec 22, 2017  1:00 PM CST   SHORT with Cephas Mawuena Agbeh, MD   [facility] [place] ([facility] [place])    [address]  [place] [address] [phone]              Who to contact     If you have questions or need follow up information about today's clinic visit or your schedule please contact [facility] directly at [phone].  Normal or non-critical lab and imaging results will be communicated to you by MyChart, letter or phone within 4 business days after the clinic has received the results. If you do not hear from us within 7 days, please contact the clinic through [employer] or phone. If you have a critical or abnormal lab result, we will notify you by phone as soon as possible.  Submit refill requests through [employer] or call your pharmacy and they will forward the refill request to us. Please allow 3 business days for your refill to be completed.          Additional Information About Your Visit        [employer] Information     [employer] gives you secure access to your electronic health record. If you see a primary care provider, you can also send messages to your care team and make appointments. If you have questions, please call your primary care clinic.  If you do not have a primary care provider, please call [phone] and they will assist you.        Care EveryWhere ID     This is your Care EveryWhere ID. This could be used by other organizations to access your [place] medical records  [id number]        Your Vitals Were     Pulse Temperature Height Last Period Pulse Oximetry BMI (Body Mass Index)    76 98  F (36.7  C) (Oral) 5' 7\" (1.702 m) 12/11/2017 98% 33.2 kg/m2       Blood Pressure from Last 3 Encounters: "   12/18/17 127/78   09/06/17 124/70   07/17/17 123/77    Weight from Last 3 Encounters:   12/18/17 212 lb (96.2 kg)   09/06/17 204 lb (92.5 kg)   07/17/17 174 lb (78.9 kg)              Today, you had the following     No orders found for display         Today's Medication Changes          These changes are accurate as of: 12/18/17  3:40 PM.  If you have any questions, ask your nurse or doctor.               Start taking these medicines.        Dose/Directions    escitalopram 10 MG tablet   Commonly known as:  LEXAPRO   Used for:  Anxiety, Moderate major depression (H)   Started by:  Angelita Goldstein APRN CNP        Dose:  10 mg   Take 1 tablet (10 mg) by mouth daily   Quantity:  30 tablet   Refills:  1       lisdexamfetamine 50 MG capsule   Commonly known as:  VYVANSE   Used for:  Attention deficit hyperactivity disorder (ADHD), predominantly inattentive type   Started by:  Angelita Goldstein APRN CNP        Dose:  50 mg   Take 1 capsule (50 mg) by mouth every morning   Quantity:  30 capsule   Refills:  0            Where to get your medicines      These medications were sent to [facility] [address] [place] [address] & [address] [address]    Hours:  24-hours Phone:  [phone]     escitalopram 10 MG tablet         Some of these will need a paper prescription and others can be bought over the counter.  Ask your nurse if you have questions.     Bring a paper prescription for each of these medications     lisdexamfetamine 50 MG capsule                Primary Care Provider Office Phone # Fax #    BABITA Abarca [phone] [address] [address] [facility] [address]        Equal Access to Services     VIRGILIO MAHER : Cony Pérez, waaxda luqadaha, qaybta kaalmada jesúsyamariam, anna marie jensen. So [facility] [phone].    ATENCIÓN: Gracie dugan  español, tiene a guillory disposición servicios gratuitos de asistencia lingüística. Olinda blue [phone].    We comply with applicable federal civil rights laws and [place] laws. We do not discriminate on the basis of race, color, national origin, age, disability, sex, sexual orientation, or gender identity.            Thank you!     Thank you for choosing [facility]  for your care. Our goal is always to provide you with excellent care. Hearing back from our patients is one way we can continue to improve our services. Please take a few minutes to complete the written survey that you may receive in the mail after your visit with us. Thank you!             Your Updated Medication List - Protect others around you: Learn how to safely use, store and throw away your medicines at www.disposemymeds.org.          This list is accurate as of: 12/18/17  3:40 PM.  Always use your most recent med list.                   Brand Name Dispense Instructions for use Diagnosis    * clomiPHENE 50 MG tablet    CLOMID    5 tablet    Take one pill a day; day 5-9 of cycle    Anovulation       * clomiPHENE 50 MG tablet    CLOMID    10 tablet    Take 2 tablets (100 mg) by mouth daily    PCOS (polycystic ovarian syndrome)       escitalopram 10 MG tablet    LEXAPRO    30 tablet    Take 1 tablet (10 mg) by mouth daily    Anxiety, Moderate major depression (H)       lisdexamfetamine 50 MG capsule    VYVANSE    30 capsule    Take 1 capsule (50 mg) by mouth every morning    Attention deficit hyperactivity disorder (ADHD), predominantly inattentive type       * Notice:  This list has 2 medication(s) that are the same as other medications prescribed for you. Read the directions carefully, and ask your doctor or other care provider to review them with you.

## 2017-12-18 NOTE — PATIENT INSTRUCTIONS
[facility]- Pediatric Department    If you have any questions regarding to your visit please contact:   Team Isatu:   Clinic Hours Telephone Number   BABITA Arreguin, HEIDI Blunt PA-C, ZULEIKA Leung,    7am - 7pm Mon - Thurs  7am - 5pm Fri [phone]    After hours and weekends, call [phone]   To make an appointment at any location anytime, please call [phone] or  [employer].   Pediatric Walk-in Clinic* 8:30am - 3pm  Mon- Fri    [facility]   8:00am - 7pm  Mon- Thurs  8:00am - 5:30 pm Friday  9am - 1pm Saturday [phone]   Urgent Care - [place]      Urgent [facility] - [place]       11pm-9pm Monday - Friday   9am-5pm Saturday - Sunday    5pm-9pm Monday - Friday  9am-5pm Saturday - Sunday [phone] - [place]      [phone] - [place]   *Pediatric Walk-In Clinic is available for children/adolescents age 0-21 for the following symptoms:  Cough/Cold symptoms   Rashes/Itchy Skin  Sore throat    Urinary tract infection  Diarrhea    Ringworm  Ear pain    Sinus infection  Fever     Pink eye       If your provider has ordered a CT, MRI, or ultrasound for you, please call to schedule:  [facility], phone [phone], fax [phone]  [facility] radiology, [phone]    If you need a medication refill please contact your pharmacy.   Please allow 3 business days for your refills to be completed.  **For ADHD medication, patient will need a follow up clinic or Evisit at least every 3 months to obtain refills.**    Use T[employer] (secure email communication and access to your chart) to send your primary care provider a message or make an appointment.  Ask someone on your Team how to sign up for Tweek[employer]oo or call the Tweekaboo help line at [phone]  To view your child's test results online: Log into your own Tweek[employer]oo account, select your  "child's name from the tabs on the right hand side, select \"My medical record\" and select \"Test results\"  Do you have options for a visit without coming into the clinic?  [employer] offers electronic visits (E-visits) and telephone visits for certain medical concerns as well as Zipnosis online.    E-visits via OpenRoute- generally incur a $35.00 fee.   Telephone visits- These are billed based on time spent (in 10-minute increments) on the phone with your provider.   5-10 minutes $30.00 fee   11-20 minutes $59.00 fee   21-30 minutes $85.00 fee  Zipnosis- $25.00 fee.  More information and link available on [email or web address] homepage.       Lisdexamfetamine Oral Capsule  Brand Name: Vyvanse  What is this medicine?  LISDEXAMFETAMINE (lis DEX am fet a meen) is used to treat attention-deficit hyperactivity disorder (ADHD) in adults and children. It is also used to treat binge-eating disorder in adults. Federal law prohibits giving this medicine to any person other than the person for whom it was prescribed. Do not share this medicine with anyone else.  How should I use this medicine?  Take this medicine by mouth. Follow the directions on the prescription label. Swallow the capsules with a drink of water. You may open capsule and add to a glass of water, then drink right away. Take your doses at regular intervals. Do not take your medicine more often than directed. Do not suddenly stop your medicine. You must gradually reduce the dose or you may feel withdrawal effects. Ask your doctor or health care professional for advice.  A special MedGuide will be given to you by the pharmacist with each prescription and refill. Be sure to read this information carefully each time.  Talk to your pediatrician regarding the use of this medicine in children. While this drug may be prescribed for children as young as 6 years of age for selected conditions, precautions do apply.  What side effects may I notice from receiving this medicine?  Side " effects that you should report to your doctor or health care professional as soon as possible:    allergic reactions like skin rash, itching or hives, swelling of the face, lips, or tongue    changes in vision    chest pain or chest tightness    confusion, trouble speaking or understanding    fast, irregular heartbeat    fingers or toes feel numb, cool, painful    hallucination, loss of contact with reality    high blood pressure    males: prolonged or painful erection    seizures    severe headaches    shortness of breath    suicidal thoughts or other mood changes    trouble walking, dizziness, loss of balance or coordination    uncontrollable head, mouth, neck, arm, or leg movements  Side effects that usually do not require medical attention (report to your doctor or health care professional if they continue or are bothersome):    anxious    headache    loss of appetite    nausea, vomiting    trouble sleeping    weight loss  What may interact with this medicine?  Do not take this medicine with any of the following medications:    MAOIs like Carbex, Eldepryl, Marplan, Nardil, and Parnate    other stimulant medicines for attention disorders, weight loss, or to stay awake  This medicine may also interact with the following medications:    acetazolamide    ammonium chloride    antacids    ascorbic acid    atomoxetine    caffeine    certain medicines for blood pressure    certain medicines for depression, anxiety, or psychotic disturbances    certain medicines for seizures like carbamazepine, phenobarbital, phenytoin    certain medicines for stomach problems like cimetidine, famotidine, omeprazole, lansoprazole    cold or allergy medicines    green tea    levodopa    linezolid    medicines for sleep during surgery    methenamine    norepinephrine    phenothiazines like chlorpromazine, mesoridazine, prochlorperazine, thioridazine    propoxyphene    sodium acid phosphate    sodium bicarbonate  What if I miss a dose?  If  you miss a dose, take it as soon as you can. If it is almost time for your next dose, take only that dose. Do not take double or extra doses.  Where should I keep my medicine?  Keep out of the reach of children. This medicine can be abused. Keep your medicine in a safe place to protect it from theft. Do not share this medicine with anyone. Selling or giving away this medicine is dangerous and against the law.  Store at room temperature between 15 and 30 degrees C (59 and 86 degrees F). Protect from light. Keep container tightly closed. Throw away any unused medicine after the expiration date.  What should I tell my health care provider before I take this medicine?  They need to know if you have any of these conditions:    anxiety or panic attacks    circulation problems in fingers and toes    glaucoma    hardening or blockages of the arteries or heart blood vessels    heart disease or a heart defect    high blood pressure    history of a drug or alcohol abuse problem    history of stroke    kidney disease    liver disease    mental illness    seizures    suicidal thoughts, plans, or attempt; a previous suicide attempt by you or a family member    thyroid disease    Tourette's syndrome    an unusual or allergic reaction to lisdexamfetamine, other medicines, foods, dyes, or preservatives    pregnant or trying to get pregnant    breast-feeding  What should I watch for while using this medicine?  Visit your doctor for regular check ups. This prescription requires that you follow special procedures with your doctor and pharmacy. You will need to have a new written prescription from your doctor every time you need a refill.  This medicine may affect your concentration, or hide signs of tiredness. Until you know how this medicine affects you, do not drive, ride a bicycle, use machinery, or do anything that needs mental alertness.  Tell your doctor or health care professional if this medicine loses its effects, or if you  feel you need to take more than the prescribed amount. Do not change your dose without talking to your doctor or health care professional.  Decreased appetite is a common side effect when starting this medicine. Eating small, frequent meals or snacks can help. Talk to your doctor if you continue to have poor eating habits. Height and weight growth of a child taking this medicine will be monitored closely.  Do not take this medicine close to bedtime. It may prevent you from sleeping.  If you are going to need surgery, a MRI, CT scan, or other procedure, tell your doctor that you are taking this medicine. You may need to stop taking this medicine before the procedure.  Tell your doctor or healthcare professional right away if you notice unexplained wounds on your fingers and toes while taking this medicine. You should also tell your healthcare provider if you experience numbness or pain, changes in the skin color, or sensitivity to temperature in your fingers or toes.  NOTE:This sheet is a summary. It may not cover all possible information. If you have questions about this medicine, talk to your doctor, pharmacist, or health care provider. Copyright  2017 [email or web address]        Patient Education    Escitalopram Oral solution    Escitalopram Oral tablet  Escitalopram Oral tablet  What is this medicine?  ESCITALOPRAM (es sye DULCE oh pram) is used to treat depression and certain types of anxiety.  This medicine may be used for other purposes; ask your health care provider or pharmacist if you have questions.  What should I tell my health care provider before I take this medicine?  They need to know if you have any of these conditions:    bipolar disorder or a family history of bipolar disorder    diabetes    glaucoma    heart disease    kidney or liver disease    receiving electroconvulsive therapy    seizures (convulsions)    suicidal thoughts, plans, or attempt by you or a family member    an unusual or allergic reaction to  escitalopram, the related drug citalopram, other medicines, foods, dyes, or preservatives    pregnant or trying to become pregnant    breast-feeding  How should I use this medicine?  Take this medicine by mouth with a glass of water. Follow the directions on the prescription label. You can take it with or without food. If it upsets your stomach, take it with food. Take your medicine at regular intervals. Do not take it more often than directed. Do not stop taking this medicine suddenly except upon the advice of your doctor. Stopping this medicine too quickly may cause serious side effects or your condition may worsen.  A special MedGuide will be given to you by the pharmacist with each prescription and refill. Be sure to read this information carefully each time.  Talk to your pediatrician regarding the use of this medicine in children. Special care may be needed.  Overdosage: If you think you have taken too much of this medicine contact a poison control center or emergency room at once.  NOTE: This medicine is only for you. Do not share this medicine with others.  What if I miss a dose?  If you miss a dose, take it as soon as you can. If it is almost time for your next dose, take only that dose. Do not take double or extra doses.  What may interact with this medicine?  Do not take this medicine with any of the following medications:    certain medicines for fungal infections like fluconazole, itraconazole, ketoconazole, posaconazole, voriconazole    cisapride    citalopram    dofetilide    dronedarone    linezolid    MAOIs like Carbex, Eldepryl, Marplan, Nardil, and Parnate    methylene blue (injected into a vein)    pimozide    thioridazine    ziprasidone  This medicine may also interact with the following medications:    alcohol    aspirin and aspirin-like medicines    carbamazepine    certain medicines for depression, anxiety, or psychotic disturbances    certain medicines for migraine headache like almotriptan,  eletriptan, frovatriptan, naratriptan, rizatriptan, sumatriptan, zolmitriptan    certain medicines for sleep    certain medicines that treat or prevent blood clots like warfarin, enoxaparin, dalteparin    cimetidine    diuretics    fentanyl    furazolidone    isoniazid    lithium    metoprolol    NSAIDs, medicines for pain and inflammation, like ibuprofen or naproxen    other medicines that prolong the QT interval (cause an abnormal heart rhythm)    procarbazine    rasagiline    supplements like [place]'s wort, kava kava, valerian    tramadol    tryptophan  This list may not describe all possible interactions. Give your health care provider a list of all the medicines, herbs, non-prescription drugs, or dietary supplements you use. Also tell them if you smoke, drink alcohol, or use illegal drugs. Some items may interact with your medicine.  What should I watch for while using this medicine?  Tell your doctor if your symptoms do not get better or if they get worse. Visit your doctor or health care professional for regular checks on your progress. Because it may take several weeks to see the full effects of this medicine, it is important to continue your treatment as prescribed by your doctor.  Patients and their families should watch out for new or worsening thoughts of suicide or depression. Also watch out for sudden changes in feelings such as feeling anxious, agitated, panicky, irritable, hostile, aggressive, impulsive, severely restless, overly excited and hyperactive, or not being able to sleep. If this happens, especially at the beginning of treatment or after a change in dose, call your health care professional.  You may get drowsy or dizzy. Do not drive, use machinery, or do anything that needs mental alertness until you know how this medicine affects you. Do not stand or sit up quickly, especially if you are an older patient. This reduces the risk of dizzy or fainting spells. Alcohol may interfere with the  effect of this medicine. Avoid alcoholic drinks.  Your mouth may get dry. Chewing sugarless gum or sucking hard candy, and drinking plenty of water may help. Contact your doctor if the problem does not go away or is severe.  What side effects may I notice from receiving this medicine?  Side effects that you should report to your doctor or health care professional as soon as possible:    allergic reactions like skin rash, itching or hives, swelling of the face, lips, or tongue    confusion    feeling faint or lightheaded, falls    fast talking and excited feelings or actions that are out of control    hallucination, loss of contact with reality    seizures    suicidal thoughts or other mood changes    unusual bleeding or bruising  Side effects that usually do not require medical attention (report to your doctor or health care professional if they continue or are bothersome):    blurred vision    changes in appetite    change in sex drive or performance    headache    increased sweating    nausea  This list may not describe all possible side effects. Call your doctor for medical advice about side effects. You may report side effects to FDA at 7-089-MCD-7491.  Where should I keep my medicine?  Keep out of reach of children.  Store at room temperature between 15 and 30 degrees C (59 and 86 degrees F). Throw away any unused medicine after the expiration date.  NOTE:This sheet is a summary. It may not cover all possible information. If you have questions about this medicine, talk to your doctor, pharmacist, or health care provider. Copyright  2016 [employer]
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
## 2017-12-19 ASSESSMENT — ANXIETY QUESTIONNAIRES: GAD7 TOTAL SCORE: 18

## 2018-01-03 ENCOUNTER — MYC MEDICAL ADVICE (OUTPATIENT)
Dept: PEDIATRICS | Facility: CLINIC | Age: 23
End: 2018-01-03

## 2018-01-03 DIAGNOSIS — F90.0 ATTENTION DEFICIT HYPERACTIVITY DISORDER (ADHD), PREDOMINANTLY INATTENTIVE TYPE: Primary | ICD-10-CM

## 2018-01-03 RX ORDER — LISDEXAMFETAMINE DIMESYLATE 70 MG/1
70 CAPSULE ORAL EVERY MORNING
Qty: 30 CAPSULE | Refills: 0 | Status: SHIPPED | OUTPATIENT
Start: 2018-01-03 | End: 2018-08-27

## 2018-01-18 ENCOUNTER — OFFICE VISIT (OUTPATIENT)
Dept: PEDIATRICS | Facility: CLINIC | Age: 23
End: 2018-01-18
Payer: COMMERCIAL

## 2018-01-18 VITALS
DIASTOLIC BLOOD PRESSURE: 81 MMHG | WEIGHT: 208 LBS | OXYGEN SATURATION: 98 % | RESPIRATION RATE: 24 BRPM | SYSTOLIC BLOOD PRESSURE: 133 MMHG | BODY MASS INDEX: 32.58 KG/M2 | HEART RATE: 91 BPM | TEMPERATURE: 97.4 F

## 2018-01-18 DIAGNOSIS — F33.2 SEVERE EPISODE OF RECURRENT MAJOR DEPRESSIVE DISORDER, WITHOUT PSYCHOTIC FEATURES (H): ICD-10-CM

## 2018-01-18 DIAGNOSIS — F41.0 PANIC ATTACK: ICD-10-CM

## 2018-01-18 DIAGNOSIS — E28.2 PCOS (POLYCYSTIC OVARIAN SYNDROME): ICD-10-CM

## 2018-01-18 DIAGNOSIS — F41.9 ANXIETY: ICD-10-CM

## 2018-01-18 DIAGNOSIS — F90.0 ATTENTION DEFICIT HYPERACTIVITY DISORDER (ADHD), PREDOMINANTLY INATTENTIVE TYPE: Primary | ICD-10-CM

## 2018-01-18 PROCEDURE — 99214 OFFICE O/P EST MOD 30 MIN: CPT | Performed by: NURSE PRACTITIONER

## 2018-01-18 RX ORDER — LISDEXAMFETAMINE DIMESYLATE 70 MG/1
70 CAPSULE ORAL EVERY MORNING
Qty: 30 CAPSULE | Refills: 0 | Status: SHIPPED | OUTPATIENT
Start: 2018-02-03 | End: 2018-06-14

## 2018-01-18 RX ORDER — LISDEXAMFETAMINE DIMESYLATE 70 MG/1
70 CAPSULE ORAL EVERY MORNING
Qty: 30 CAPSULE | Refills: 0 | Status: SHIPPED | OUTPATIENT
Start: 2018-03-03 | End: 2018-05-09

## 2018-01-18 RX ORDER — LORAZEPAM 0.5 MG/1
0.5 TABLET ORAL EVERY 6 HOURS PRN
Qty: 20 TABLET | Refills: 1 | Status: SHIPPED | OUTPATIENT
Start: 2018-01-18 | End: 2018-08-27

## 2018-01-18 ASSESSMENT — ANXIETY QUESTIONNAIRES
5. BEING SO RESTLESS THAT IT IS HARD TO SIT STILL: SEVERAL DAYS
GAD7 TOTAL SCORE: 17
6. BECOMING EASILY ANNOYED OR IRRITABLE: NEARLY EVERY DAY
3. WORRYING TOO MUCH ABOUT DIFFERENT THINGS: NEARLY EVERY DAY
1. FEELING NERVOUS, ANXIOUS, OR ON EDGE: NEARLY EVERY DAY
2. NOT BEING ABLE TO STOP OR CONTROL WORRYING: NEARLY EVERY DAY
7. FEELING AFRAID AS IF SOMETHING AWFUL MIGHT HAPPEN: MORE THAN HALF THE DAYS

## 2018-01-18 ASSESSMENT — PATIENT HEALTH QUESTIONNAIRE - PHQ9
SUM OF ALL RESPONSES TO PHQ QUESTIONS 1-9: 13
5. POOR APPETITE OR OVEREATING: MORE THAN HALF THE DAYS

## 2018-01-18 NOTE — PROGRESS NOTES
"SUBJECTIVE:   Mi Salgado is a 22 year old female who presents to clinic today with self because of:    Chief Complaint   Patient presents with     A.D.H.D     Anxiety     does not like the anxiety med         HPI  ADHD Follow-Up    Date of last ADHD office visit:12/18/17  Status since last visit: when she is taking the Vyvanse and that it felt like in the middle of lunch she was super tired.  She could tell it was working in the AM but after lunch was tired and not as focused.  So she does have a script for Vyvanse 70 mg but has not picked up yet.  Taking controlled (daily) medications as prescribed: Yes                       Parent/Patient Concerns with Medications: None  ADHD Medication     Amphetamines Disp Start End    lisdexamfetamine (VYVANSE) 70 MG capsule 30 capsule 1/3/2018     Sig - Route: Take 1 capsule (70 mg) by mouth every morning - Oral    Class: Local Print    lisdexamfetamine (VYVANSE) 50 MG capsule 30 capsule 12/18/2017     Sig - Route: Take 1 capsule (50 mg) by mouth every morning - Oral    Class: Local Print        Sleep: no problems  Home/Family Concerns: Stable  Peer Concerns: Stable    Co-Morbid Diagnosis: Anxiety    Currently in counseling: will be starting      Medication Benefits:   Controlled symptoms: Attention span, Distractability and Finishing tasks  Uncontrolled symptoms: Attention span, Distractability and Finishing tasks    Medication side effects:  Side effects noted: none  Denies: appetite suppression, weight loss, insomnia, tics, palpitations, stomach ache, headache, emotional lability, rebound irritability, drowsiness, \"zombie\" effect, growth suppression and dry mouth         Depression Follow-Up    Status since last visit: she took the Lexapro and on ly took a couple of doses and was agitated and made her feel weird.  She stopped it and is not taking anything.. She is scheduled to see a counselor in February.  She denies suicidal ideation.  Her worrying waxes and " wanes.    See PHQ-9 for current symptoms.    Other associated symptoms:None    Complicating factors:   Significant life event: No   Current substance abuse: None  Anxiety / Panic symptoms: yes on Panic attacks and will hyperventilate and hard for her to breathe.  Needs to go to the dentist but she cannot get there as she has such bad panic attacks  PHQ-9  English PHQ-9   Any Language        Concern:  PCOS.  She is not getting her period and she was on clomid as they do want to have a baby.  She is not being treatment for her PCOS and she gained a lot of weight when she started the clomid.  She is frustrated and is trying to get into OB but a very long wait.     ROS  GENERAL:  NEGATIVE for fever, poor appetite, and sleep disruption.  SKIN:  NEGATIVE for rash, hives, and eczema.  EYE:  NEGATIVE for pain, discharge, redness, itching and vision problems.  ENT:  NEGATIVE for ear pain, runny nose, congestion and sore throat.  RESP:  NEGATIVE for cough, wheezing, and difficulty breathing.  CARDIAC:  NEGATIVE for chest pain and cyanosis.   GI:  NEGATIVE for vomiting, diarrhea, abdominal pain and constipation.  :  NEGATIVE for urinary problems.  NEURO:  NEGATIVE for headache and weakness.  ALLERGY:  As in Allergy History        PROBLEM LIST  Patient Active Problem List    Diagnosis Date Noted     Anxiety 12/18/2017     Priority: Medium     Hyperlipidemia with target LDL less than 160 12/18/2017     Priority: Medium     PCOS (polycystic ovarian syndrome) 09/27/2017     Priority: Medium     Anovulation 09/06/2017     Priority: Medium     Genital herpes 08/19/2015     Priority: Medium     Tobacco use disorder 03/06/2014     Priority: Medium     ADHD (attention deficit hyperactivity disorder) 09/20/2012     Priority: Medium     Severe episode of recurrent major depressive disorder, without psychotic features (H) 12/29/2011     Priority: Medium     Hospitalized at [facility] 12/12-12/19/11  Started on prozac during  hospitalization  Also seen at [facility] and [facility].       Cluster B personality disorder 12/29/2011     Priority: Medium     Per psych discharge summary dated 12/19/11       Oppositional defiant disorder      Priority: Medium     Diagnosed age 15 at [facility] in [place]  Hospitalized at [facility] 12/12-12/19/11  Problem list name updated by automated process. Provider to review        MEDICATIONS  Current Outpatient Prescriptions   Medication Sig Dispense Refill     LORazepam (ATIVAN) 0.5 MG tablet Take 1 tablet (0.5 mg) by mouth every 6 hours as needed for anxiety 20 tablet 1     lisdexamfetamine (VYVANSE) 70 MG capsule Take 1 capsule (70 mg) by mouth every morning 30 capsule 0     lisdexamfetamine (VYVANSE) 50 MG capsule Take 1 capsule (50 mg) by mouth every morning 30 capsule 0     escitalopram (LEXAPRO) 10 MG tablet Take 1 tablet (10 mg) by mouth daily 30 tablet 1     clomiPHENE (CLOMID) 50 MG tablet Take 2 tablets (100 mg) by mouth daily 10 tablet 6     clomiPHENE (CLOMID) 50 MG tablet Take one pill a day; day 5-9 of cycle 5 tablet 6      ALLERGIES  Allergies   Allergen Reactions     No Known Allergies        Reviewed and updated as needed this visit by clinical staff  Allergies  Meds  Problems         Reviewed and updated as needed this visit by Provider  Allergies  Meds  Problems       OBJECTIVE:     /81  Pulse 91  Temp 97.4  F (36.3  C) (Oral)  Resp 24  Wt 208 lb (94.3 kg)  LMP 01/11/2018  SpO2 98%  BMI 32.58 kg/m2  Normalized stature-for-age data not available for patients older than 20 years.  Normalized weight-for-age data not available for patients older than 20 years.  Normalized BMI data available only for age 0 to 20 years.  Normalized stature-for-age data not available for patients older than 20 years.    GENERAL: Active, alert, in no acute distress.  SKIN: Clear. No significant rash, abnormal pigmentation or lesions  HEAD: Normocephalic.  EYES:  No discharge or erythema.  Normal pupils and EOM.  EARS: Normal canals. Tympanic membranes are normal; gray and translucent.  NOSE: Normal without discharge.  MOUTH/THROAT: Clear. No oral lesions. Teeth intact without obvious abnormalities.  NECK: Supple, no masses.  LYMPH NODES: No adenopathy  LUNGS: Clear. No rales, rhonchi, wheezing or retractions  HEART: Regular rhythm. Normal S1/S2. No murmurs.  NEUROLOGIC: No focal findings. Cranial nerves grossly intact: DTR's normal. Normal strength and tone      DIAGNOSTICS:   PHQ-9 ([employer]) 12/18/2017   1.  Little interest or pleasure in doing things 3   2.  Feeling down, depressed, or hopeless 2   3.  Trouble falling or staying asleep, or sleeping too much 1   4.  Feeling tired or having little energy 3   5.  Poor appetite or overeating 0   6.  Feeling bad about yourself 2   7.  Trouble concentrating 3   8.  Moving slowly or restless 1   9.  Suicidal or self-harm thoughts 0   PHQ-9 Total Score 15   Difficulty at work, home, or with people Extremely difficult     PHQ-9 ([employer]) 1/18/2018   1.  Little interest or pleasure in doing things 2   2.  Feeling down, depressed, or hopeless 2   3.  Trouble falling or staying asleep, or sleeping too much 3   4.  Feeling tired or having little energy 2   5.  Poor appetite or overeating 0   6.  Feeling bad about yourself 1   7.  Trouble concentrating 3   8.  Moving slowly or restless 0   9.  Suicidal or self-harm thoughts 0   PHQ-9 Total Score 13   Difficulty at work, home, or with people Somewhat difficult     RACHEAL-7   [employer], 2002; Used with Permission) 12/18/2017 1/18/2018   1. Feeling nervous, anxious, or on edge 3 3   2. Not being able to stop or control worrying 3 3   3. Worrying too much about different things 3 3   4. Trouble relaxing 1 2   5. Being so restless that it is hard to sit still 2 1   6. Becoming easily annoyed or irritable 3 3   7. Feeling afraid, as if something awful might happen 3 2   RACHEAL-7 Total Score 18 17       ASSESSMENT/PLAN:    (F90.0) Attention deficit hyperactivity disorder (ADHD), predominantly inattentive type  (primary encounter diagnosis)  Comment:   Plan: lisdexamfetamine (VYVANSE) 70 MG capsule,         lisdexamfetamine (VYVANSE) 70 MG capsule        She will fill her Vyvanse 70 mg script that was at the  waiting for her and 2 more scripts given.  She will follow up in 3 months.    (F41.0) Panic attack  Comment:   Plan: LORazepam (ATIVAN) 0.5 MG tablet        To take as needed for panic attacks.  For her dental visit do talk with the dentist and see if they would use nitrous oxide if so do not take the Ativan too before a visit otherwise OK to try the Ativan.  Cautioned if she takes this she cannot drive.    (E28.2) PCOS (polycystic ovarian syndrome)  Comment:   Plan:   Have recommended she see  Marily Reis NP of management.    (F33.2) Severe episode of recurrent major depressive disorder, without psychotic features (H)  (F41.9) Anxiety  Comment:   Plan:   Will hold off on meds for now and see how counseling goes.          FOLLOW UP: in 3 month(s)  Greater than 25 min with greater than 50 % in counseling on depression anxiety, ADHD, PCOS and treatment options.      Angelita Goldstein, PNP, APRN CNP

## 2018-01-18 NOTE — MR AVS SNAPSHOT
After Visit Summary   1/18/2018    Mi Salgado    MRN: [id number]           Patient Information     Date Of Birth          1995        Visit Information        Provider Department      1/18/2018 4:30 PM Angelita Goldstein APRN CNP [facility]        Today's Diagnoses     Attention deficit hyperactivity disorder (ADHD), predominantly inattentive type    -  1    Panic attack           Follow-ups after your visit        Your next 10 appointments already scheduled     Feb 12, 2018 11:00 AM CST   New Visit with Ally [facility] ([facility])    [address] [place] [address]   [phone]            Feb 20, 2018  4:00 PM CST   Return Visit with Ally [facility] ([facility]) [address] [place] [address]   [phone]              Who to contact     If you have questions or need follow up information about today's clinic visit or your schedule please contact [facility] directly at [phone].  Normal or non-critical lab and imaging results will be communicated to you by MyChart, letter or phone within 4 business days after the clinic has received the results. If you do not hear from us within 7 days, please contact the clinic through [email or web address] or phone. If you have a critical or abnormal lab result, we will notify you by phone as soon as possible.  Submit refill requests through [employer] or call your pharmacy and they will forward the refill request to us. Please allow 3 business days for your refill to be completed.          Additional Information About Your Visit        MyChart Information     [employer] gives you secure access to your electronic health record. If you see a primary care provider, you can also send messages to your care team and make appointments. If you have questions, please call your primary care clinic.  If  you do not have a primary care provider, please call [phone] and they will assist you.        Care EveryWhere ID     This is your Care EveryWhere ID. This could be used by other organizations to access your [facility] medical records  [id number]        Your Vitals Were     Pulse Temperature Respirations Last Period Pulse Oximetry BMI (Body Mass Index)    91 97.4  F (36.3  C) (Oral) 24 01/11/2018 98% 32.58 kg/m2       Blood Pressure from Last 3 Encounters:   01/18/18 133/81   12/18/17 127/78   09/06/17 124/70    Weight from Last 3 Encounters:   01/18/18 208 lb (94.3 kg)   12/18/17 212 lb (96.2 kg)   09/06/17 204 lb (92.5 kg)              Today, you had the following     No orders found for display         Today's Medication Changes          These changes are accurate as of: 1/18/18  5:18 PM.  If you have any questions, ask your nurse or doctor.               Start taking these medicines.        Dose/Directions    LORazepam 0.5 MG tablet   Commonly known as:  ATIVAN   Used for:  Panic attack        Dose:  0.5 mg   Take 1 tablet (0.5 mg) by mouth every 6 hours as needed for anxiety   Quantity:  20 tablet   Refills:  1            Where to get your medicines      Some of these will need a paper prescription and others can be bought over the counter.  Ask your nurse if you have questions.     Bring a paper prescription for each of these medications     LORazepam 0.5 MG tablet                Primary Care Provider Office Phone # Fax #    Angelita BABITA [facility] [phone] [id number] [facility] [address]        Equal Access to Services     [facility] NIKA AH: Hadsonja Pérez, wajoséda hui, qaybta kaalanna marie lopez. So [facility] [phone].    ATENCIÓN: Si habla español, tiene a guillory disposición servicios gratuitos de asistencia lingüística. Llame al [phone].    We comply with applicable federal civil rights laws and  [place] laws. We do not discriminate on the basis of race, color, national origin, age, disability, sex, sexual orientation, or gender identity.            Thank you!     Thank you for choosing [facility]  for your [facility]. Our goal is always to provide you with excellent care. Hearing back from our patients is one way we can continue to improve our services. Please take a few minutes to complete the written survey that you may receive in the mail after your visit with us. Thank you!             Your Updated Medication List - Protect others around you: Learn how to safely use, store and throw away your medicines at www.disposemymeds.org.          This list is accurate as of: 1/18/18  5:18 PM.  Always use your most recent med list.                   Brand Name Dispense Instructions for use Diagnosis    * clomiPHENE 50 MG tablet    CLOMID    5 tablet    Take one pill a day; day 5-9 of cycle    Anovulation       * clomiPHENE 50 MG tablet    CLOMID    10 tablet    Take 2 tablets (100 mg) by mouth daily    PCOS (polycystic ovarian syndrome)       escitalopram 10 MG tablet    LEXAPRO    30 tablet    Take 1 tablet (10 mg) by mouth daily    Anxiety, Severe episode of recurrent major depressive disorder, without psychotic features (H)       * lisdexamfetamine 50 MG capsule    VYVANSE    30 capsule    Take 1 capsule (50 mg) by mouth every morning    Attention deficit hyperactivity disorder (ADHD), predominantly inattentive type       * lisdexamfetamine 70 MG capsule    VYVANSE    30 capsule    Take 1 capsule (70 mg) by mouth every morning    Attention deficit hyperactivity disorder (ADHD), predominantly inattentive type       LORazepam 0.5 MG tablet    ATIVAN    20 tablet    Take 1 tablet (0.5 mg) by mouth every 6 hours as needed for anxiety    Panic attack       * Notice:  This list has 4 medication(s) that are the same as other medications prescribed for you. Read the directions carefully, and ask your doctor  or other care provider to review them with you.

## 2018-01-19 ASSESSMENT — ANXIETY QUESTIONNAIRES: GAD7 TOTAL SCORE: 17

## 2018-05-06 ENCOUNTER — MYC MEDICAL ADVICE (OUTPATIENT)
Dept: PEDIATRICS | Facility: CLINIC | Age: 23
End: 2018-05-06

## 2018-05-06 DIAGNOSIS — F90.0 ATTENTION DEFICIT HYPERACTIVITY DISORDER (ADHD), PREDOMINANTLY INATTENTIVE TYPE: ICD-10-CM

## 2018-05-09 RX ORDER — LISDEXAMFETAMINE DIMESYLATE 70 MG/1
70 CAPSULE ORAL EVERY MORNING
Qty: 30 CAPSULE | Refills: 0 | Status: SHIPPED | OUTPATIENT
Start: 2018-05-09 | End: 2018-06-14

## 2018-05-09 NOTE — TELEPHONE ENCOUNTER
Mi called to request vyvanse refill.  She took the last pill today, so needs refill as soon as possible today.  Call her at [phone] when script is ready at .

## 2018-05-09 NOTE — TELEPHONE ENCOUNTER
Mi states she missed her appt yesterday as she was at work. She is out of [email or web address] please call [phone]. Patient has scheduled appt for the  5/23/18 at 3:30

## 2018-05-09 NOTE — TELEPHONE ENCOUNTER
spoke to patient who was being extremely impatient about getting the script tonight, so  went and spoke to Angelita LANDERS, she handed me the script which I brought to the  where it was prepared for patient .  Thank you

## 2018-06-14 ENCOUNTER — TELEPHONE (OUTPATIENT)
Dept: PEDIATRICS | Facility: CLINIC | Age: 23
End: 2018-06-14

## 2018-06-14 DIAGNOSIS — F90.0 ATTENTION DEFICIT HYPERACTIVITY DISORDER (ADHD), PREDOMINANTLY INATTENTIVE TYPE: Primary | ICD-10-CM

## 2018-06-14 RX ORDER — LISDEXAMFETAMINE DIMESYLATE 70 MG/1
70 CAPSULE ORAL EVERY MORNING
Qty: 30 CAPSULE | Refills: 0 | Status: SHIPPED | OUTPATIENT
Start: 2018-07-14 | End: 2018-08-27

## 2018-06-14 RX ORDER — LISDEXAMFETAMINE DIMESYLATE 70 MG/1
70 CAPSULE ORAL EVERY MORNING
Qty: 30 CAPSULE | Refills: 0 | Status: SHIPPED | OUTPATIENT
Start: 2018-06-14 | End: 2018-08-27

## 2018-06-14 NOTE — TELEPHONE ENCOUNTER
Patient is calling stating needs her medication today, is requesting RX: lisdexamfetamine (VYVANSE) 70 MG capsule. Please call to advise. Thank  You.

## 2018-06-14 NOTE — TELEPHONE ENCOUNTER
"Called patient, informed her she needs an appointment with Angelita to discuss this refill. Patient then stated she has car trouble, and she was 16 minutes late to her appointment at no fault of her own, and that she needs this med now.     Let her know this med requires planning and following providers advice.     Patient verbalized frustration towards writer, via a loud and forced tone. Advised patient that \"if you are going to yell at me, I will need to end this call.\" writer used silence until patient was done verbalizing her frustration in a loud forced tone.     Patient then stated she is frustrated and we have \"no right\" to take her off her med.    Writer is not sure patient will keep her pending appointment Monday morning for the med refill as patient stated she has to work.   GYPSY HumphreyN RN   "

## 2018-06-14 NOTE — TELEPHONE ENCOUNTER
Patient in calling on the status of this.  She had a appointment today and was turned away.  They made her an appt at [place] and as she was driving there they called her and the doctor could not prescribe that medication.

## 2018-06-14 NOTE — TELEPHONE ENCOUNTER
Patient last seen by Angelita LANDERS in Jan.   Instructed to return in 3 months.   Patient no-showed next scheduled appointment but Rx was refilled and patient was told to reschedule.  Patient then no-showed the last 2 appointments with this provider.  Patient also no-showed appointment with a different provider this afternoon to get med refilled.    Routing to provider to advise if patient makes appointment if Rx will be refilled OR if it can only be refilled at office appointment since patient has had 5 no-shows with different providers in 5 months.     Viola Garcia  BSN, RN

## 2018-06-15 NOTE — TELEPHONE ENCOUNTER
Call to Mi re: meds.  I can refill but please don't get upset with our nurse.  She was frustrated as she was late for her appointment to be seen at [facility] and then sent her to [facility] and then the provider does not prescribe ADHD meds.  I will have 2 scripts for her at the  for you so you have them until you can get into an adult provider.  When you call the clinic of [facility] or [facility] make sure you ask for a provider that will prescribe ADHD meds.  Scripts taken to  for .    Angelita Goldstein, APRN, CNP

## 2018-08-27 ENCOUNTER — OFFICE VISIT (OUTPATIENT)
Dept: FAMILY MEDICINE | Facility: CLINIC | Age: 23
End: 2018-08-27
Payer: COMMERCIAL

## 2018-08-27 VITALS
WEIGHT: 174 LBS | DIASTOLIC BLOOD PRESSURE: 79 MMHG | HEART RATE: 93 BPM | TEMPERATURE: 99.6 F | HEIGHT: 66 IN | SYSTOLIC BLOOD PRESSURE: 125 MMHG | BODY MASS INDEX: 27.97 KG/M2

## 2018-08-27 DIAGNOSIS — F90.0 ATTENTION DEFICIT HYPERACTIVITY DISORDER (ADHD), PREDOMINANTLY INATTENTIVE TYPE: Primary | ICD-10-CM

## 2018-08-27 DIAGNOSIS — H69.92 DYSFUNCTION OF LEFT EUSTACHIAN TUBE: ICD-10-CM

## 2018-08-27 PROCEDURE — 99214 OFFICE O/P EST MOD 30 MIN: CPT | Performed by: PHYSICIAN ASSISTANT

## 2018-08-27 RX ORDER — LISDEXAMFETAMINE DIMESYLATE 70 MG/1
70 CAPSULE ORAL EVERY MORNING
Qty: 30 CAPSULE | Refills: 0 | Status: SHIPPED | OUTPATIENT
Start: 2018-08-27 | End: 2020-09-10

## 2018-08-27 RX ORDER — FLUTICASONE PROPIONATE 50 MCG
1-2 SPRAY, SUSPENSION (ML) NASAL DAILY
Qty: 1 BOTTLE | Refills: 1 | Status: SHIPPED | OUTPATIENT
Start: 2018-08-27 | End: 2021-11-24

## 2018-08-27 ASSESSMENT — ANXIETY QUESTIONNAIRES
6. BECOMING EASILY ANNOYED OR IRRITABLE: SEVERAL DAYS
GAD7 TOTAL SCORE: 14
5. BEING SO RESTLESS THAT IT IS HARD TO SIT STILL: SEVERAL DAYS
1. FEELING NERVOUS, ANXIOUS, OR ON EDGE: NEARLY EVERY DAY
7. FEELING AFRAID AS IF SOMETHING AWFUL MIGHT HAPPEN: MORE THAN HALF THE DAYS
3. WORRYING TOO MUCH ABOUT DIFFERENT THINGS: NEARLY EVERY DAY
2. NOT BEING ABLE TO STOP OR CONTROL WORRYING: NEARLY EVERY DAY

## 2018-08-27 ASSESSMENT — PATIENT HEALTH QUESTIONNAIRE - PHQ9: 5. POOR APPETITE OR OVEREATING: SEVERAL DAYS

## 2018-08-27 NOTE — PATIENT INSTRUCTIONS
Restart 70 mg vyvanse  Follow up in 1 month with me, sooner if needed    Start flonase nasal spray for fluid in ear

## 2018-08-27 NOTE — PROGRESS NOTES
SUBJECTIVE:   Mi Salgado is a 23 year old female who presents to clinic today for the following health issues:    Medication Followup of Vyvanse    Taking Medication as prescribed: yes    Side Effects:  None    Medication Helping Symptoms:  Yes has been working well   She is in with boyfriend today    *  Left ear pain and dizziness over the last month and a half, she was seen in med [facility] and given inhaler which did not help.  Went to the urgent care right when it started. She said no ear infection, had pressure in sinuses, steroids (didn't take), inhaler (had a cough, told she had bronchitis), mucus relief  Dizziness when she bends down and comes back up, spinning. Not worsening.    New job at [employer]  Lives in [place], works in the area here    In high school tried adderall, had side effects   vyvanse in HS: 9th-11th/beginning of 12th grade  vyvanse has worked well    Restarted vyvanse in December, has been out  She can defniitely tell a difference with vyvanse, had a lot of issues at work before, switched jobs a lot    Anxiety - did not like how lexapro made her feel  She does have anxiety, not sure yet what she would like to do  [employer] in [place] - appointment this week     Problem list and histories reviewed & adjusted, as indicated.  Additional history: as documented    According to [facility], last controlled prescription was:  Unable to access due to internet issues    Patient Active Problem List   Diagnosis     Severe episode of recurrent major depressive disorder, without psychotic features (H)     Oppositional defiant disorder     Cluster B personality disorder     ADHD (attention deficit hyperactivity disorder)     Tobacco use disorder     Genital herpes     Anovulation     PCOS (polycystic ovarian syndrome)     Anxiety     Hyperlipidemia with target LDL less than 160     Past Surgical History:   Procedure Laterality Date     NO HISTORY OF SURGERY         Social History  "  Substance Use Topics     Smoking status: Current Every Day Smoker     Packs/day: 0.25     Types: Cigarettes     Last attempt to quit: 3/16/2011     Smokeless tobacco: Never Used     Alcohol use No     Family History   Problem Relation Age of Onset     Depression Other            Reviewed and updated as needed this visit by clinical staff  Tobacco  Allergies  Meds  Problems  Med Hx  Surg Hx  Fam Hx  Soc Hx        Reviewed and updated as needed this visit by Provider  Tobacco  Allergies  Meds  Problems  Med Hx  Surg Hx  Fam Hx  Soc Hx          ROS:  Other than noted above, general, HEENT, respiratory, cardiac, MS, and gastrointestinal systems are negative.     OBJECTIVE:     /79  Pulse 93  Temp 99.6  F (37.6  C) (Tympanic)  Ht 5' 5.87\" (1.673 m)  Wt 174 lb (78.9 kg)  BMI 28.2 kg/m2  Body mass index is 28.2 kg/(m^2).  GENERAL: healthy, alert and no distress  EYES: Eyes grossly normal to inspection, PERRL and conjunctivae and sclerae normal  HENT: ear canals and TM's normal, nose and mouth without ulcers or lesions POSITIVE left TM no erythema, does have congestion/fluid  NECK: no adenopathy, no asymmetry, masses, or scars and thyroid normal to palpation  RESP: lungs clear to auscultation - no rales, rhonchi or wheezes  CV: regular rate and rhythm, normal S1 S2, no S3 or S4, no murmur, click or rub, no peripheral edema and peripheral pulses strong  ABDOMEN: soft, nontender, no hepatosplenomegaly, no masses and bowel sounds normal  MS: no gross musculoskeletal defects noted, no edema  PSYCH: Alert and oriented times 3; speech- coherent , normal rate and volume; able to articulate logical thoughts, able to abstract reason, no tangential thoughts, no hallucinations or delusions, affect- normal, somewhat scattered    ASSESSMENT/PLAN:     ASSESSMENT/PLAN:      ICD-10-CM    1. Attention deficit hyperactivity disorder (ADHD), predominantly inattentive type F90.0 lisdexamfetamine (VYVANSE) 70 MG " capsule   2. Dysfunction of left eustachian tube H69.82 fluticasone (FLONASE) 50 MCG/ACT spray     New patient to clinic. Recommended close follow up. She will consider restarting medication for anxiety,we discussed this, but she would like to restart vyvanse first. She is starting therapy this week - [facility] in [place].    Patient Instructions   Restart 70 mg vyvanse  Follow up in 1 month with me, sooner if needed    Start flonase nasal spray for fluid in ear    Ally Burnham PA-C  [facility]

## 2018-08-27 NOTE — MR AVS SNAPSHOT
After Visit Summary   8/27/2018    Mi Salgado    MRN: [id number]           Patient Information     Date Of Birth          1995        Visit Information        Provider Department      8/27/2018 6:00 PM Ally Burnham PA-C [facility]        Today's Diagnoses     Dysfunction of left eustachian tube    -  1    Attention deficit hyperactivity disorder (ADHD), predominantly inattentive type          Care Instructions    Restart 70 mg vyvanse  Follow up in 1 month with me, sooner if needed    Start flonase nasal spray for fluid in ear          Follow-ups after your visit        Who to contact     Normal or non-critical lab and imaging results will be communicated to you by Stremorhart, letter or phone within 4 business days after the clinic has received the results. If you do not hear from us within 7 days, please contact the clinic through [employer] or phone. If you have a critical or abnormal lab result, we will notify you by phone as soon as possible.  Submit refill requests through [employer] or call your pharmacy and they will forward the refill request to us. Please allow 3 business days for your refill to be completed.          If you need to speak with a  for additional information , please call: [phone]             Additional Information About Your Visit        StremorharUnidym Information     [employer] gives you secure access to your electronic health record. If you see a primary care provider, you can also send messages to your care team and make appointments. If you have questions, please call your primary care clinic.  If you do not have a primary care provider, please call [phone] and they will assist you.        Care EveryWhere ID     This is your Care EveryWhere ID. This could be used by other organizations to access your [facility] medical records  [id number]        Your Vitals Were     Pulse Temperature Height BMI (Body Mass Index)          93 99.6  F (37.6  " C) (Tympanic) 5' 5.87\" (1.673 m) 28.2 kg/m2         Blood Pressure from Last 3 Encounters:   08/27/18 125/79   01/18/18 133/81   12/18/17 127/78    Weight from Last 3 Encounters:   08/27/18 174 lb (78.9 kg)   01/18/18 208 lb (94.3 kg)   12/18/17 212 lb (96.2 kg)              Today, you had the following     No orders found for display         Today's Medication Changes          These changes are accurate as of 8/27/18  7:00 PM.  If you have any questions, ask your nurse or doctor.               Start taking these medicines.        Dose/Directions    fluticasone 50 MCG/ACT spray   Commonly known as:  FLONASE   Used for:  Dysfunction of left eustachian tube   Started by:  Ally Burnham PA-C        Dose:  1-2 spray   Spray 1-2 sprays into both nostrils daily   Quantity:  1 Bottle   Refills:  1            Where to get your medicines      These medications were sent to [facility] [address] [place] [address] & [address] [address]     Phone:  [phone]     fluticasone 50 MCG/ACT spray         Some of these will need a paper prescription and others can be bought over the counter.  Ask your nurse if you have questions.     Bring a paper prescription for each of these medications     lisdexamfetamine 70 MG capsule                Primary Care Provider Office Phone # Fax #    Ally Burnham PA-C [phone] [address] [facility] [facility] [address]        Equal Access to Services     [facility] Hospital[facility]UD[facility]ILA AH: Hadii darlene brandt hadashjuan c Soreshma, waaxda luqadaha, qaybta kaalmada ademahogany, anna marie jensen. So [facility] [phone].    ATENCIÓN: Si habla español, tiene a guillory disposición servicios gratuitos de asistencia lingüística. Llame al [phone].    We comply with applicable federal civil rights laws and [place] laws. We do not discriminate on the basis of race, color, national origin, age, disability, " sex, sexual orientation, or gender identity.            Thank you!     Thank you for choosing [facility]  for your care. Our goal is always to provide you with excellent care. Hearing back from our patients is one way we can continue to improve our services. Please take a few minutes to complete the written survey that you may receive in the mail after your visit with us. Thank you!             Your Updated Medication List - Protect others around you: Learn how to safely use, store and throw away your medicines at www.disposemymeds.org.          This list is accurate as of 8/27/18  7:00 PM.  Always use your most recent med list.                   Brand Name Dispense Instructions for use Diagnosis    fluticasone 50 MCG/ACT spray    FLONASE    1 Bottle    Spray 1-2 sprays into both nostrils daily    Dysfunction of left eustachian tube       lisdexamfetamine 70 MG capsule    VYVANSE    30 capsule    Take 1 capsule (70 mg) by mouth every morning    Attention deficit hyperactivity disorder (ADHD), predominantly inattentive type       tiZANidine 4 MG tablet    ZANAFLEX     TK ONE T PO QHS

## 2018-08-28 ASSESSMENT — ANXIETY QUESTIONNAIRES: GAD7 TOTAL SCORE: 14

## 2018-08-28 ASSESSMENT — PATIENT HEALTH QUESTIONNAIRE - PHQ9: SUM OF ALL RESPONSES TO PHQ QUESTIONS 1-9: 11

## 2018-08-28 NOTE — PROGRESS NOTES
Adult ADHD Self-Report Scale (ASRS-v1.1) Symptom Checklist Instructions    The questions below are designed to stimulate dialogue between you and your patients and to help confirm if they may be suffering from the symptoms of attention-deficit/hyperactivity disorder (ADHD).    Description:  The Symptom Checklist is an instrument consisting of the 18 DSM-IV-TR criteria.   Six of the 18 questions were found to be the most predictive of symptoms consistent with ADHD.   These six questions are the basis for the ASRS v1.1 Screener and are also Part A of the Symptom Checklist.  Part B of the Symptom Checklist contains the remaining 12 questions.    Instructions:    Symptoms:    1.  Ask the patient to complete both Part A and Part B of the Symptom Checklist by marking an X in the box that most closely represents the frequency of occurrence of each of the symptoms.    2.  Score Part A.   If four or more marks appear in the darkly shaded boxes within Part A then the patient has symptoms highly consistent with ADHD in adults and further investigation is warranted.    3.  The frequency scores on Part B provide additional cues and can serve as further probes into the patient's symptoms.   Pay particular attention to marks appearing in the dark shaded boxes.   The frequency-based response is more sensitive with certain questions.  It has been found that the six questions in Part A are the most predictive of the disorder and are best for use as a screening instrument.    Impairments    1.  Review the entire Symptom Checklist with your patient and evaluate the level of impairment associated with the symptom.    2.  Consider work/school, social and family settings.    3.  Symptom frequency is often associated with symptom severity, therefore the Symptom Checklist may also aid in the assessment of impairments.  If your patients have frequent symptoms, you may want to ask them to describe how these problems have affected the ability  to work, take care of things at home, or get along with other people such as their spouse/significant other.    History    1.   Assess the presence of these symptoms or similar symptoms in childhood.  Adults who have ADHD need not have been formally diagnosed in childhood.  In evaluating a patient's history, look for evidence of early-appearing and long-standing problems with attention or self-control.   Some significant symptoms should have been present in childhood, but full symptomatology is not necessary.    Adult ADHD Self-Report Scale (ASRS-v1.1) Symptom Checklist    Patient Name:_____________________________  Today's date: _________________    Please answer the questions below, rating yourself on each of the criteria shown using the scale on the right side of the page.  As you answer each questions, place an 'X' in the box that best describes how you have felt and conducted yourself over the past 6 mos.   Please give this completed checklist to your healthcare professional to discuss during today's appointment.                                                                                                    Some-                                                                        Never   Rarely      times       Often  Very Often  1. How often do you have trouble wrapping up the final details of a project,  once the challenging parts have been done?   x     2. How often do you have difficulty getting things in order when you have to do a task that requires organization?    x    3. How often do you have problems remembering appointments or obligations?   x     4. When you have a task that requires a lot of thought, how often do you avoid or delay getting started?   x     5. How often do you fidget or squirm with your hands or feet when you have to sit down for a long time?     x   6. How often do you feel overly active and compelled to do things, like you were driven by a motor?   x       Part A                                                         7. How often do you make careless mistakes when you have to work on a boring or difficult project?  x      8. How often do you have difficulty keeping your attention when you are doing boring or repetitive work?    x    9. How often do you have difficulty concentrating on what people say to you, even when they are speaking to you directly?   x     10. How often do you misplace or have difficulty finding things at home or at work?     x   11. How often are you distracted by activity or noise around you?     x   12. How often do you leave your seat in meetings or other situations in which you are expected to remain seated?     x     13. How often do you feel restless or fidgety?     x     14. How often do you have difficulty unwinding and relaxing when you have time to yourself?    x    15. How often do you find yourself talking too much when you are in social situations?   x     16. When you're in a conversation, how often do you find yourself finishing the sentences of the people you are talking to, before they can finish them themselves?   x     17. How often do you have difficulty waiting your turn in situations when turn-taking is required?    x    18. How often do you interrupt others when they are busy?    x    Part B

## 2018-09-21 ENCOUNTER — OFFICE VISIT (OUTPATIENT)
Dept: FAMILY MEDICINE | Facility: CLINIC | Age: 23
End: 2018-09-21
Payer: COMMERCIAL

## 2018-09-21 VITALS
DIASTOLIC BLOOD PRESSURE: 79 MMHG | WEIGHT: 179.7 LBS | SYSTOLIC BLOOD PRESSURE: 120 MMHG | BODY MASS INDEX: 28.88 KG/M2 | HEART RATE: 95 BPM | TEMPERATURE: 99.2 F | HEIGHT: 66 IN

## 2018-09-21 DIAGNOSIS — F31.81 BIPOLAR 2 DISORDER, MAJOR DEPRESSIVE EPISODE (H): ICD-10-CM

## 2018-09-21 DIAGNOSIS — F33.2 SEVERE EPISODE OF RECURRENT MAJOR DEPRESSIVE DISORDER, WITHOUT PSYCHOTIC FEATURES (H): Primary | ICD-10-CM

## 2018-09-21 PROBLEM — F43.12 CHRONIC POST-TRAUMATIC STRESS DISORDER (PTSD): Status: ACTIVE | Noted: 2018-09-13

## 2018-09-21 PROBLEM — F12.20 SEVERE CANNABIS USE DISORDER (H): Status: ACTIVE | Noted: 2018-09-12

## 2018-09-21 PROCEDURE — 99213 OFFICE O/P EST LOW 20 MIN: CPT | Performed by: PHYSICIAN ASSISTANT

## 2018-09-21 RX ORDER — DOCOSANOL 100 MG/G
1 CREAM TOPICAL
COMMUNITY
End: 2021-11-09

## 2018-09-21 RX ORDER — ARIPIPRAZOLE 10 MG/1
10 TABLET ORAL DAILY
COMMUNITY
Start: 2018-09-15 | End: 2021-07-07

## 2018-09-21 ASSESSMENT — ANXIETY QUESTIONNAIRES
3. WORRYING TOO MUCH ABOUT DIFFERENT THINGS: NEARLY EVERY DAY
1. FEELING NERVOUS, ANXIOUS, OR ON EDGE: MORE THAN HALF THE DAYS
2. NOT BEING ABLE TO STOP OR CONTROL WORRYING: NEARLY EVERY DAY
7. FEELING AFRAID AS IF SOMETHING AWFUL MIGHT HAPPEN: MORE THAN HALF THE DAYS
GAD7 TOTAL SCORE: 17
5. BEING SO RESTLESS THAT IT IS HARD TO SIT STILL: MORE THAN HALF THE DAYS
6. BECOMING EASILY ANNOYED OR IRRITABLE: MORE THAN HALF THE DAYS

## 2018-09-21 ASSESSMENT — PATIENT HEALTH QUESTIONNAIRE - PHQ9: 5. POOR APPETITE OR OVEREATING: NEARLY EVERY DAY

## 2018-09-21 NOTE — PROGRESS NOTES
"  SUBJECTIVE:   Mi Salgado is a 23 year old female who presents to clinic today for the following health issues:    Hospital Follow-up Visit:    Hospital/Nursing Home/IP Rehab Facility: [facility]  Date of Admission: 9/12/18  Date of Discharge: 9/14/18  Reason(s) for Admission: suicidal ideation            Problems taking medications regularly:  Not taking       Medication changes since discharge: None       Problems adhering to non-medication therapy:  Yes missing appointments   Summary of hospitalization:  [facility] discharge summary reviewed  Diagnostic Tests/Treatments reviewed.  Follow up needed: none  Other Healthcare Providers Involved in Patient s Care:         therapy, psychiatry  Update since discharge: worsened.   Post Discharge Medication Reconciliation: discharge medications reconciled and changed, per note/orders (see AVS).  Plan of care communicated with patient     Coding guidelines for this visit:  Type of Medical   Decision Making Face-to-Face Visit       within 7 Days of discharge Face-to-Face Visit        within 14 days of discharge   Moderate Complexity [id number]   High Complexity [id number]          abilify \"made me feel so crazy\" felt dizzy, off  She took the vyvanse day after out of hospital, has not taken since  Restless, slept 3 hours last night  Missed all appointments - therapist, psychiatrist (9/17/18 with Rosalia). She admits she is depressed, she thinks she just didn't want to go to appointments   Mind is \"constantly running\"  She feels the only thing that helps is vyvanse    She denies any suicidal ideation or thoughts of wanting to hurt herself. She states she does not want to hurt herself. She states she would call the crisis hotline (she has this number). She has 6 cats plus a litter who are \"like my kids\" and this would stop her from hurting herself  She thinks that stressors precipitated her hospitalization mostly issues with boyfriend    She lives " "in apartment with boyfriend. Issues with their relationship - she is supporting him financially. 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 Medication management performed and symptoms of principal diagnosis. Further medication management performed to target comorbid illnesses. Psychiatric medication management included:    Abilify: Performed medication management for mood stabilization. Consider future titration.    Vyvanse: Discontinued PTA medication due to concerns of accelerating mood state.      Problem list and histories reviewed & adjusted, as indicated.  Additional history: as documented    Reviewed and updated as needed this visit by clinical staff  Tobacco  Allergies  Meds  Problems       Reviewed and updated as needed this visit by Provider  Allergies  Meds  Problems         ROS:  Other than noted above, general, HEENT, respiratory, cardiac, MS, and gastrointestinal systems are negative.     OBJECTIVE:     /79  Pulse 95  Temp 99.2  F (37.3  C) (Tympanic)  Ht 5' 5.83\" (1.672 m)  Wt 179 lb 11.2 oz (81.5 kg)  BMI 29.16 kg/m2  Body mass index is 29.16 kg/(m^2).  GENERAL APPEARANCE: healthy, alert and no distress  PSYCH: Alert and oriented times 3; speech- coherent , normal rate and volume; able to articulate logical thoughts, able to abstract reason, no tangential thoughts, no hallucinations or delusions, affect- patient alternated between flat affect and somewhat angry. Did not yell. Poor eye contact.      ASSESSMENT/PLAN:     ASSESSMENT/PLAN:      ICD-10-CM    1. Severe episode of recurrent major depressive disorder, without psychotic features (H) F33.2 CARE COORDINATION REFERRAL   2. Bipolar 2 disorder, major depressive episode (H) F31.81 CARE COORDINATION REFERRAL     I stated that I will not prescribe " "vyvanse as it was recommended by psychiatry to discontinue and that abilify was recommended. Patient states \"they don't know abdiel damianmnadi about me\" \"You're not going to keep drugging me up with these meds\". Suggested we could trial low dose abilify.  I told patient the best thing to help her would be following up with psychiatry for medication management. She stated \"I'm not going to psychiatry\" \"fuck abilify\". I stated that I wanted to help her and I'm worried her depression will get worse.  Patient then left the room.    Ally Burnham PA-C  [facility]"

## 2018-09-21 NOTE — MR AVS SNAPSHOT
After Visit Summary   9/21/2018    Mi Salgado    MRN: [id number]           Patient Information     Date Of Birth          1995        Visit Information        Provider Department      9/21/2018 3:00 PM Ally Burnham PA-C [facility]        Today's Diagnoses     Severe episode of recurrent major depressive disorder, without psychotic features (H)    -  1    Bipolar 2 disorder, major depressive episode (H)           Follow-ups after your visit        Additional Services     CARE COORDINATION REFERRAL       Services are provided by a Care Coordinator for people with complex needs such as: medical, social, or financial troubles.  The Care Coordinator works with the patient and their Primary Care Provider to determine health goals, obtain resources, achieve outcomes, and develop care plans that help coordinate the patient's care.     Reason for Referral: Mental Wellness (Health) (Mental Illness/Chemical Depedency): Resources of Behavioral Health Services and Treatment Options    Additional pertinent details:  See note. Patient does not want to see psychiatry or treat depression/bipolar. Did tell her that someone would call to check in with her. She may not respond.    Clinical Staff have discussed the Care Coordination Referral with the patient and/or caregiver: yes                  Who to contact     Normal or non-critical lab and imaging results will be communicated to you by MyChart, letter or phone within 4 business days after the clinic has received the results. If you do not hear from us within 7 days, please contact the clinic through MyChart or phone. If you have a critical or abnormal lab result, we will notify you by phone as soon as possible.  Submit refill requests through [employer] or call your pharmacy and they will forward the refill request to us. Please allow 3 business days for your refill to be completed.          If you need to speak with a  for  "additional information , please call: [phone]             Additional Information About Your Visit        MyChart Information     [employer] gives you secure access to your electronic health record. If you see a primary care provider, you can also send messages to your care team and make appointments. If you have questions, please call your primary care clinic.  If you do not have a primary care provider, please call [phone] and they will assist you.        Care EveryWhere ID     This is your Care EveryWhere ID. This could be used by other organizations to access your [facility] medical records  [id number]        Your Vitals Were     Pulse Temperature Height BMI (Body Mass Index)          95 99.2  F (37.3  C) (Tympanic) 5' 5.83\" (1.672 m) 29.16 kg/m2         Blood Pressure from Last 3 Encounters:   09/21/18 120/79   08/27/18 125/79   01/18/18 133/81    Weight from Last 3 Encounters:   09/21/18 179 lb 11.2 oz (81.5 kg)   08/27/18 174 lb (78.9 kg)   01/18/18 208 lb (94.3 kg)              We Performed the Following     CARE COORDINATION REFERRAL        Primary Care Provider Office Phone # Fax #    Ally Burnham PA-C [phone] [address] [facility] [address]        Equal Access to Services     VIRGILIO MAHER AH: Hadii aad ku hadasho Soomaali, waaxda luqadaha, qaybta kaalmada adeegyada, waxay idiin haykathy noriega . So [facility] [phone].    ATENCIÓN: Si habla español, tiene a guillory disposición servicios gratuitos de asistencia lingüística. Llame al [phone].    We comply with applicable federal civil rights laws and [place] laws. We do not discriminate on the basis of race, color, national origin, age, disability, sex, sexual orientation, or gender identity.            Thank you!     Thank you for choosing [facility]  for your care. Our goal is always to provide you with excellent care. Hearing back from our patients is one way we can continue to improve our " services. Please take a few minutes to complete the written survey that you may receive in the mail after your visit with us. Thank you!             Your Updated Medication List - Protect others around you: Learn how to safely use, store and throw away your medicines at www.disposemymeds.org.          This list is accurate as of 9/21/18  3:39 PM.  Always use your most recent med list.                   Brand Name Dispense Instructions for use Diagnosis    ARIPiprazole 10 MG tablet    ABILIFY     Take 10 mg by mouth daily        docosanol 10 % Crea cream    ABREVA     Apply 1 Application topically 5 times daily        fluticasone 50 MCG/ACT spray    FLONASE    1 Bottle    Spray 1-2 sprays into both nostrils daily    Dysfunction of left eustachian tube       lisdexamfetamine 70 MG capsule    VYVANSE    30 capsule    Take 1 capsule (70 mg) by mouth every morning    Attention deficit hyperactivity disorder (ADHD), predominantly inattentive type       tiZANidine 4 MG tablet    ZANAFLEX     TK ONE T PO QHS

## 2018-09-22 ASSESSMENT — ANXIETY QUESTIONNAIRES: GAD7 TOTAL SCORE: 17

## 2018-09-22 ASSESSMENT — PATIENT HEALTH QUESTIONNAIRE - PHQ9: SUM OF ALL RESPONSES TO PHQ QUESTIONS 1-9: 9

## 2018-09-24 ENCOUNTER — PATIENT OUTREACH (OUTPATIENT)
Dept: CARE COORDINATION | Facility: CLINIC | Age: 23
End: 2018-09-24

## 2018-09-24 NOTE — PROGRESS NOTES
"Clinic Care Coordination Contact  [facility]/Voicemail    Referral Source: PCP \"Mental Wellness (Health) (Mental Illness/Chemical Depedency): Resources of Behavioral Health Services and Treatment Options.  See note. Patient does not want to see psychiatry or treat depression/ bipolar. Did tell her that someone would call to check in with her. She may not respond.\"    Clinical Data: Care Coordinator Outreach    Outreach attempted x 1.  Left general message pt's boyfriend with call back information and requested return call.    Plan: Care Coordinator mailed out care coordination introduction letter on 9-24-18. Care Coordinator will try to reach patient again in 3-5 business days.    Fatimah Castro  Social Work Care Coordinator  [facility]  [phone]        "

## 2018-09-24 NOTE — LETTER
[facility] CARE COORDINATION  [address] [place] [address]      September 24, 2018      Mi Salgado  [address] [place] [address]      Dear Mi,    I am a clinic care coordinator who works with Ally Burnham PA-C at the [facility] and I recently tried to call and was unable to reach you. I wanted to introduce myself and provide you with my contact information so that you can call me with questions or concerns about your health care. Below is a description of clinic care coordination and how I can further assist you.     The clinic care coordinator is a registered nurse and/or  who understand the health care system. The goal of clinic care coordination is to help you manage your health and improve access to the [facility] system in the most efficient manner. The registered nurse can assist you in meeting your health care goals by providing education, coordinating services, and strengthening the communication among your providers. The  can assist you with financial, behavioral, psychosocial, chemical dependency, counseling, and/or psychiatric resources.    Please feel free to contact me at [phone], with any questions or concerns. We at [facility] are focused on providing you with the highest-quality healthcare experience possible and that all starts with you.     Sincerely,     Fatiamh Burnett    Enclosed: I have enclosed a copy of a 24 Hour Access Plan. This has helpful phone numbers for you to call when needed. Please keep this in an easy to access place to use as needed.

## 2018-09-24 NOTE — LETTER
Health Care Home - Access Care Plan    About Me:  Patient Name:  Mi Salgado    YOB: 1995  Age:                           23 year old   [place] MRN:          [id number] Telephone Information:   Home Phone [phone]   Mobile [phone]       Address:    [address] [place] [address] Email address:  [email or web address]      Emergency Contact(s)  Name Relationship Lgl Grd Work Phone Home Phone Mobile Phone   1. COLLINMARTA* Friend   [phone]              Health Maintenance: Routine Health maintenance Reviewed: Due/Overdue    My Access Plan  Medical Emergency 911   Questions or concerns during clinic hours Primary Clinic Line, I will call the clinic directly: [facility] [phone]   24 Hour Appointment Line [phone] or  [phone]) (toll free)   24 Hour Nurse Line [phone] (toll free)   Questions or concerns outside clinic hours 24 Hour Appointment Line, I will call the after-hours on-call line:   [facility] [phone] or [phone]) (toll-free)   Preferred Urgent Care  Unknown at this time   Preferred Hospital  Unknown at this time   Preferred Pharmacy [facility] [address] [place] [address]     Behavioral Health Crisis Line The National Suicide Prevention Lifeline at 1-543.175.4624 or 911             My Care Team Members  Patient Care Team       Relationship Specialty Notifications Start End    Ally Burnham PA-C PCP - General Physician Assistant  8/27/18     Phone: [phone] Fax: [phone] [address] [address] [facility] [address]    Fatimah Burnett LSW Clinic Care Coordinator Primary Care - CC Admissions 9/24/18     Phone: [phone] Fax: [phone]               My Medical and Care Information  Problem List   Patient Active Problem List   Diagnosis     Severe episode of recurrent major depressive  disorder, without psychotic features (H)     Oppositional defiant disorder     Cluster B personality disorder     ADHD (attention deficit hyperactivity disorder)     Tobacco use disorder     Genital herpes     Anovulation     PCOS (polycystic ovarian syndrome)     Anxiety     Hyperlipidemia with target LDL less than 160     Bipolar 2 disorder, major depressive episode (H)     Chronic post-traumatic stress disorder (PTSD)     Severe cannabis use disorder (H)      Current Medications and Allergies:  See printed Medication Report

## 2018-09-27 NOTE — PROGRESS NOTES
Clinic Care Coordination Contact    Clinic Care Coordination Contact  OUTREACH    Referral Information:  Referral Source: PCP  Primary Diagnosis: Behavioral Health    SW placed a call to the pt.  Introduced self, title and role and explained that PCP placed a [facility] referral.  As SW was asking the pt how I may be of assistance to her, the pt hung up on this writer.  SW attempted to call the pt back, but the phone then went directly to [facility].      Chief Complaint   Patient presents with     Clinic Care Coordination - Initial     social work     Universal Utilization: Clinic Utilization  Difficulty keeping appointments:: Yes  Compliance Concerns: Yes  No-Show Concerns: Yes  No PCP office visit in Past Year: No  Utilization    Last refreshed: 9/25/2018  7:22 AM:  No Show Count (past year) 10       Last refreshed: 9/25/2018  7:22 AM:  ED visits 0       Last refreshed: 9/25/2018  7:22 AM:  Hospital admissions 0          Current as of: 9/25/2018  7:22 AM           Plan: SW will not open pt to care coordination and will not call pt again.  Letter of introduction was mailed to the pt on 9-24-18.    Fatimah Talley  Social Work Care Coordinator  [facility]  [phone]

## 2018-10-04 ENCOUNTER — MYC MEDICAL ADVICE (OUTPATIENT)
Dept: PEDIATRICS | Facility: CLINIC | Age: 23
End: 2018-10-04

## 2018-10-04 NOTE — TELEPHONE ENCOUNTER
Per Angelita:  Call and see how we can give her support.   Patient states she went back on  Vyvance  And has seen a great ability to stick with employment.  She was seen in the hospital ( for depression, anxiety, suicidal ideation)  (9/11/18 [facility]) because of the stressful situation at home, she was in a break down mode.  Hasn't happened since 9th grade.  The psychiatrist in the hospital stated her medications was the reason she was in the hospital.  They started her on Abilify and she does not like how she feels. They made it sound like she had to take these medications to be able to get out of the hospital ( as she was on a 72 hour hold) they took the hold off after 48 hours or so and told her she needed to schedule an appointment psychiatrist. Made it very clear not to stop the Abilify.  Patient never picked up the Prescription(s) that was sent for her.      Provider:  She is reaching out to Angelita to see if there is a doctor that you recommend for her to see to be able to stay Vyvance as the patient does not feel this is the reason she ended up in the hospital. Patient was given psychiatry resource that pediatrics has (given 5 places).  I asked her to call her insurance to see if she needs to go to a particular place also.     Fouzia Bryson R.N.

## 2018-12-07 ENCOUNTER — COMMUNICATION - HEALTHEAST (OUTPATIENT)
Dept: TELEHEALTH | Facility: CLINIC | Age: 23
End: 2018-12-07

## 2018-12-07 ENCOUNTER — OFFICE VISIT - HEALTHEAST (OUTPATIENT)
Dept: FAMILY MEDICINE | Facility: CLINIC | Age: 23
End: 2018-12-07

## 2018-12-07 DIAGNOSIS — Z86.59 HISTORY OF ADHD: ICD-10-CM

## 2019-01-16 ENCOUNTER — COMMUNICATION - HEALTHEAST (OUTPATIENT)
Dept: SCHEDULING | Facility: CLINIC | Age: 24
End: 2019-01-16

## 2019-01-17 ENCOUNTER — OFFICE VISIT - HEALTHEAST (OUTPATIENT)
Dept: FAMILY MEDICINE | Facility: CLINIC | Age: 24
End: 2019-01-17

## 2019-01-17 DIAGNOSIS — Z86.59 HISTORY OF ADHD: ICD-10-CM

## 2019-04-17 ENCOUNTER — OFFICE VISIT (OUTPATIENT)
Dept: URGENT CARE | Facility: URGENT CARE | Age: 24
End: 2019-04-17
Payer: COMMERCIAL

## 2019-04-17 VITALS
HEIGHT: 66 IN | DIASTOLIC BLOOD PRESSURE: 64 MMHG | HEART RATE: 80 BPM | RESPIRATION RATE: 14 BRPM | WEIGHT: 175 LBS | SYSTOLIC BLOOD PRESSURE: 122 MMHG | TEMPERATURE: 97.8 F | BODY MASS INDEX: 28.12 KG/M2

## 2019-04-17 DIAGNOSIS — J02.9 ACUTE PHARYNGITIS, UNSPECIFIED ETIOLOGY: Primary | ICD-10-CM

## 2019-04-17 DIAGNOSIS — R07.0 THROAT PAIN: ICD-10-CM

## 2019-04-17 LAB
DEPRECATED S PYO AG THROAT QL EIA: NORMAL
SPECIMEN SOURCE: NORMAL

## 2019-04-17 PROCEDURE — 87081 CULTURE SCREEN ONLY: CPT | Performed by: PHYSICIAN ASSISTANT

## 2019-04-17 PROCEDURE — 99213 OFFICE O/P EST LOW 20 MIN: CPT | Performed by: PHYSICIAN ASSISTANT

## 2019-04-17 PROCEDURE — 87880 STREP A ASSAY W/OPTIC: CPT | Performed by: INTERNAL MEDICINE

## 2019-04-17 ASSESSMENT — MIFFLIN-ST. JEOR: SCORE: 1566.57

## 2019-04-17 NOTE — PROGRESS NOTES
SUBJECTIVE:   Mi Salgado is a 23 year old female presenting with a chief complaint of   Chief Complaint   Patient presents with     Urgent Care     Pharyngitis     sore throat, feverish, head feels heavy and aches.    .    URI Adult    Onset of symptoms was 1 day(s) ago.  Course of illness is worsening.    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      ROS   See HPI    PMH:  Past Medical History:   Diagnosis Date     Genital HSV 2015    [facility]     Oppositional defiant disorder of childhood or adolescence      Patient Active Problem List   Diagnosis     Severe episode of recurrent major depressive disorder, without psychotic features (H)     Oppositional defiant disorder     Cluster B personality disorder (H)     ADHD (attention deficit hyperactivity disorder)     Tobacco use disorder     Genital herpes     Anovulation     PCOS (polycystic ovarian syndrome)     Anxiety     Hyperlipidemia with target LDL less than 160     Bipolar 2 disorder, major depressive episode (H)     Chronic post-traumatic stress disorder (PTSD)     Severe cannabis use disorder (H)         Current medications:  Current Outpatient Medications   Medication Sig Dispense Refill     ARIPiprazole (ABILIFY) 10 MG tablet Take 10 mg by mouth daily       docosanol (ABREVA) 10 % CREA cream Apply 1 Application topically 5 times daily       fluticasone (FLONASE) 50 MCG/ACT spray Spray 1-2 sprays into both nostrils daily 1 Bottle 1     lisdexamfetamine (VYVANSE) 70 MG capsule Take 1 capsule (70 mg) by mouth every morning 30 capsule 0     tiZANidine  "(ZANAFLEX) 4 MG tablet TK ONE T PO QHS  0       Surgical History:  Past Surgical History:   Procedure Laterality Date     NO HISTORY OF SURGERY         Family history:  Family History   Problem Relation Age of Onset     Depression Other          Social History:  Social History     Tobacco Use     Smoking status: Current Every Day Smoker     Packs/day: 0.25     Types: Cigarettes     Last attempt to quit: 3/16/2011     Years since quittin.0     Smokeless tobacco: Never Used   Substance Use Topics     Alcohol use: No     Alcohol/week: 0.0 oz         OBJECTIVE  /64   Pulse 80   Temp 97.8  F (36.6  C) (Tympanic)   Resp 14   Ht 1.67 m (5' 5.75\")   Wt 79.4 kg (175 lb)   LMP 2019   Breastfeeding? No   BMI 28.46 kg/m      General: alert, appears mildly fatigued but nontoxic, NAD. Afebrile.  Skin: no suspicious lesions or rashes.  HEENT: Normocephalic.   Eyes: conjunctiva clear.   Ears: TMs with effusion bilaterally. Left TM with mild erythema but no bulging.   Nose: mild erythema, no edema of nasal mucosa. No rhinorrhea.  Oropharynx: MMM. + posterior pharyngeal erythema, no petechiae or exudate. No tonsillar hypertrophy. Uvula midline.    Neck: supple, no lymphadenopathy.  Respiratory: No distress. Equal inspiration to bilateral bases. No crackles wheeze, rhonchi, rales.   Cardiovascular: RRR. No murmurs, clicks, gallups, or rub.          Labs:  Results for orders placed or performed in visit on 19 (from the past 24 hour(s))   Strep, Rapid Screen   Result Value Ref Range    Specimen Description Throat     Rapid Strep A Screen       NEGATIVE: No Group A streptococcal antigen detected by immunoassay, await culture report.             ASSESSMENT/PLAN:    ICD-10-CM    1. Acute pharyngitis, unspecified etiology J02.9    2. Throat pain R07.0 Strep, Rapid Screen           Medical Decision Making:    Differential Diagnosis:  -Strep  -viral pharyngitis    Serious Comorbid Conditions: none affecting MDM " today    RST negative.  Patient appears well and is tolerating oral intake. No signs of peritonsillar or retropharyngeal abscess on exam. Clear lungs. This is likely a viral infection. Discussed likely viral etiology with patient/parent and recommended supportive cares. We will follow up on overnight Strep result tomorrow.      At the end of the encounter, I discussed all available results, as well as the diagnosis and any associated medications. I discussed red flags for immediate return to clinic/ER, as well as indications for follow up. Refer to patient instructions below, which were all addressed with patient. Patient understood and agreed to plan. Patient was appropriate for discharge.      Patient Instructions   Rapid Strep test was negative.   We will run the Strep culture and if this returns positive in 24-48 hours, we will notify you and call in antibiotic prescription.      Symptoms are likely due to viral infection that will resolve on its own in 3-7 days.    May continue with symptomatic treatments including:  -salt water gargles  -warm beverages such as tea  -throat drops  -ibuprofen or Tylenol for pain or fever    If fevers not coming down with Tylenol or ibuprofen, shortness of breath, not tolerating oral liquid intake, drooling, or stiff neck, return for evaluation immediately. Otherwise, if no improvement in the next week, follow up with primary care provider.              Fabiola Ricks PA-C

## 2019-04-18 LAB
BACTERIA SPEC CULT: NORMAL
SPECIMEN SOURCE: NORMAL

## 2019-10-14 ENCOUNTER — OFFICE VISIT (OUTPATIENT)
Dept: OBGYN | Facility: CLINIC | Age: 24
End: 2019-10-14
Payer: COMMERCIAL

## 2019-10-14 VITALS — HEART RATE: 79 BPM | SYSTOLIC BLOOD PRESSURE: 124 MMHG | DIASTOLIC BLOOD PRESSURE: 85 MMHG

## 2019-10-14 DIAGNOSIS — Z11.3 SCREEN FOR STD (SEXUALLY TRANSMITTED DISEASE): ICD-10-CM

## 2019-10-14 DIAGNOSIS — R10.2 PELVIC PAIN IN FEMALE: ICD-10-CM

## 2019-10-14 DIAGNOSIS — Z32.00 PREGNANCY EXAMINATION OR TEST, PREGNANCY UNCONFIRMED: Primary | ICD-10-CM

## 2019-10-14 LAB
ALBUMIN UR-MCNC: NEGATIVE MG/DL
APPEARANCE UR: CLEAR
BILIRUB UR QL STRIP: NEGATIVE
COLOR UR AUTO: YELLOW
GLUCOSE UR STRIP-MCNC: NEGATIVE MG/DL
HCG UR QL: NEGATIVE
HGB UR QL STRIP: NEGATIVE
KETONES UR STRIP-MCNC: NEGATIVE MG/DL
LEUKOCYTE ESTERASE UR QL STRIP: NEGATIVE
NITRATE UR QL: NEGATIVE
PH UR STRIP: 7 PH (ref 5–7)
SOURCE: NORMAL
SP GR UR STRIP: 1.01 (ref 1–1.03)
SPECIMEN SOURCE: NORMAL
UROBILINOGEN UR STRIP-ACNC: 0.2 EU/DL (ref 0.2–1)
WET PREP SPEC: NORMAL

## 2019-10-14 PROCEDURE — 87591 N.GONORRHOEAE DNA AMP PROB: CPT | Performed by: OBSTETRICS & GYNECOLOGY

## 2019-10-14 PROCEDURE — 81003 URINALYSIS AUTO W/O SCOPE: CPT | Performed by: OBSTETRICS & GYNECOLOGY

## 2019-10-14 PROCEDURE — 99214 OFFICE O/P EST MOD 30 MIN: CPT | Performed by: OBSTETRICS & GYNECOLOGY

## 2019-10-14 PROCEDURE — 81025 URINE PREGNANCY TEST: CPT | Performed by: OBSTETRICS & GYNECOLOGY

## 2019-10-14 PROCEDURE — 87491 CHLMYD TRACH DNA AMP PROBE: CPT | Performed by: OBSTETRICS & GYNECOLOGY

## 2019-10-14 PROCEDURE — 87210 SMEAR WET MOUNT SALINE/INK: CPT | Performed by: OBSTETRICS & GYNECOLOGY

## 2019-10-14 NOTE — PROGRESS NOTES
Mi is a 24 year old  referred here by self for consultation regarding pelvic pain on and off x 1 year. Pain is unrelated to menses that are regular. She was treated for gonorrhea and yeast about 5-6 weeks ago in urgent care. She has not been retested. This  was unknown unprotected intercourse. She just had intercourse with her old boyfriend and it was painful with sharp pains.   Menses are 4 days late. No contraception since depo-porovera 6 years ago. She had an ultrasound for infertility testing in  as bellow that showed polycystic ovaries. No other workup done.  She is concerned she may have endometriosis.  Here with female friend.  GYN Ultrasound    Exam performed on:  2015  Referring provider: Ivania Berger  Referring clinic: [facility]  Clinical indications: abnormal bleeding  LMP:  11/9/15  Sonographer(s) initials: ANDERSON    The pelvic organs are imaged using: both transvaginal and transabdominal   transducers to better visualize the pelvic anatomy.  Today's ultrasound was compared to previous report from: None    Measurements and comments  Uterus:  Longitudinal AP Transverse   6.2 3.5 5.6 cm   Position:  retroverted and retroflexed  Comments:  symmetrical    Cervix and lower uterine segment are: Normal  Myometrium: homogeneous    Saline infusion sonohysterogram: no  Endometrium: 11.3 mm, smooth and regular    Right ovary:   Longitudinal AP Transverse   4 2.2 2.5 cm   Comments: multiple small cysts around the periphery    Left ovary:  Longitudinal AP Transverse   3.7 2.4 2.8 cm   Comments: multiple small cysts around the periphery    Adnexa:  no masses seen    Peritoneal fluid: present    Other procedures done: none    Impression: Normal sized uterus with normal appearing endometrium.  Normal   sized ovaries seen with multiple follicles seen throughout the ovary.     These findings are suggestive (but not diagnostic) of polycystic ovarian   syndrome.      Plan: These findings were reviewed  with the patient. She will follow up   with her referring provider.     Johnson Hanson MD    ROS: Ten point review of systems was reviewed and negative except the above.    Gyne: - abn pap (last pap ), + STD's    Past Medical History:   Diagnosis Date     Genital HSV 2015    [facility] HSV 1 [facility]     Oppositional defiant disorder of childhood or adolescence      Past Surgical History:   Procedure Laterality Date     NO HISTORY OF SURGERY       Patient Active Problem List   Diagnosis     Severe episode of recurrent major depressive disorder, without psychotic features (H)     Oppositional defiant disorder     Cluster B personality disorder (H)     ADHD (attention deficit hyperactivity disorder)     Tobacco use disorder     Genital herpes     Anovulation     PCOS (polycystic ovarian syndrome)     Anxiety     Hyperlipidemia with target LDL less than 160     Bipolar 2 disorder, major depressive episode (H)     Chronic post-traumatic stress disorder (PTSD)     Severe cannabis use disorder (H)       ALL/Meds: Her medication and allergy histories were reviewed and are documented in their appropriate chart areas.    SH: - tob, - EtOH, single    FH: Her family history was reviewed and documented in its appropriate chart area.    PE: /85   Pulse 79   LMP 09/10/2019 (Exact Date)   Breastfeeding? No   There is no height or weight on file to calculate BMI.    General Appearance:  healthy, alert, active, no distress  HEENT: NCAT  Abdomen: Soft, nontender.  Normal bowel sounds.  No masses  PSYCH: NOrmal.  Pelvic:       - Ext: NEFG,        - Urethra: no lesions, no masses, no hypermobility       - Urethral Meatus: normal appearance, no       - Bladder: no tenderness, no masses       - Vagina: pink, moist, normal rugae, no lesions, no discharge       - Cervix: no lesions, nulliparous       - Uterus: no sized, noAV/RV/Midplane, mobile, no contour       - Adnexa: no masses, no tenderness        - Rectal: deferred, normal tone, negative guaic       - Pelvic support: no cystocele, no rectocele, no uterine prolapse  Nurse present for exam.    Results for orders placed or performed in visit on 10/14/19   HCG Qual, Urine ([id number])   Result Value Ref Range    HCG Qual Urine Negative NEG^Negative   *UA reflex to Microscopic   Result Value Ref Range    Color Urine Yellow     Appearance Urine Clear     Glucose Urine Negative NEG^Negative mg/dL    Bilirubin Urine Negative NEG^Negative    Ketones Urine Negative NEG^Negative mg/dL    Specific Gravity Urine 1.015 1.003 - 1.035    Blood Urine Negative NEG^Negative    pH Urine 7.0 5.0 - 7.0 pH    Protein Albumin Urine Negative NEG^Negative mg/dL    Urobilinogen Urine 0.2 0.2 - 1.0 EU/dL    Nitrite Urine Negative NEG^Negative    Leukocyte Esterase Urine Negative NEG^Negative    Source Midstream Urine    Wet prep   Result Value Ref Range    Specimen Description Vagina     Wet Prep No Trichomonas seen     Wet Prep No clue cells seen     Wet Prep No yeast seen     Wet Prep WBC'S seen  Rare          A/P    ICD-10-CM    1. Pregnancy examination or test, pregnancy unconfirmed Z32.00 HCG Qual, Urine ([id number])   2. Pelvic pain in female R10.2 *UA reflex to Microscopic     Neisseria gonorrhoeae PCR     Chlamydia trachomatis PCR     Wet prep     US Pelvic Complete w Transvaginal   3. Screen for STD (sexually transmitted disease) Z11.3 *UA reflex to Microscopic     Neisseria gonorrhoeae PCR     Chlamydia trachomatis PCR     Wet prep     US Pelvic Complete w Transvaginal      Due to her interest, we discussed the pathophysiology and chronic nature of endometriosis.  Printed information  was  given to the patient.  Discussed expectant management as well as use of hormonal manipulation with oral contraceptive pills, patch, ring, Depo-provera, and Depo-lupron.  Discussed that Lupron is a short term therapy of 6 months due to side effects and affect on bone density.  Discussed  possible use of Lupron with estrogen  addback therapy.     Discussed association of endometriosis with infertility, but that amount of disease does not tend to correlate with who will have difficulty conceiving or severity of symptoms.  Discussed the potential for surgical intervention although symptom reduction is often temporary after fulguration.  Discussed hysterectomy with BSO as definitive surgery, but that leads to surgical menopause with permanent infertility and that a portion of women may continue to experience pelvic pain post-surgically.    STD testing pending.    ACOG information provided on the above topics.    25 minutes was spent face to face with the patient today discussing her history, diagnosis, and follow-up plan as noted above.  Over 50% of the visit was spent in counseling and coordination of care.    Total Visit Time: 30 minutes.      CEPHAS AGBEH, MD.

## 2019-10-14 NOTE — PATIENT INSTRUCTIONS
If you have any questions regarding your visit, Please contact your care team.  [facility]: [phone]  [facility] CLINIC HOURS TELEPHONE NUMBER   Cephas Agbeh, M.D. Lisa -       Patrice Trotter         Monday-Jacob    8:00a.m-4:45 p.m    Tuesday--[place]     8:00a.m-4:45 p.m.    Thursday-Jacob    8:00a.m-4:45 p.m.    Friday-Jacob    8:00a.m-4:45 p.m    [facility]   [address] [place] [address]   [phone]-Ask for [facility]   Fax [phone]   [phone]     [facility]   [address] [place] [address] [place] [address]   [phone]    [facility]  [address] [facility] [address]  [phone]  Cowaqhz-[phone]   Urgent Care locations:    [facility] Monday-Friday  5 pm - 9 pm  Saturday and Sunday   9 am - 5 pm   Monday-Friday   5 pm - 9 pm  Saturday and Sunday  9 am - 5 pm    [phone]   If you need a medication refill, please contact your pharmacy. Please allow 3 business days for your refill to be completed.  As always, Thank you for trusting us with your healthcare needs!

## 2019-10-15 LAB
C TRACH DNA SPEC QL NAA+PROBE: NEGATIVE
N GONORRHOEA DNA SPEC QL NAA+PROBE: NEGATIVE
SPECIMEN SOURCE: NORMAL
SPECIMEN SOURCE: NORMAL

## 2019-10-23 ENCOUNTER — TELEPHONE (OUTPATIENT)
Dept: OBGYN | Facility: CLINIC | Age: 24
End: 2019-10-23

## 2019-10-23 NOTE — TELEPHONE ENCOUNTER
"RN called patient, she states she doesn't look at her mychart and she got a new number.     RN relayed results and provider notes,     \"Written by Agbeh, Cephas Mawuena, MD on 10/15/2019  1:43 PM   Test results were discussed at office visit.   CEPHAS AGBEH, MD.     Ms. Salgado,     All of your STD labs were normal for you.     Please contact the clinic if you have additional questions.  Thank you.     Sincerely,     Cephas Mawuena Agbeh, MD\"    Patient verbalized understanding and had no further questions.     Isamar Kilpatrick RN on 10/23/2019 at 10:27 AM      "

## 2020-03-01 ENCOUNTER — HEALTH MAINTENANCE LETTER (OUTPATIENT)
Age: 25
End: 2020-03-01

## 2020-04-29 ENCOUNTER — VIRTUAL VISIT (OUTPATIENT)
Dept: FAMILY MEDICINE | Facility: CLINIC | Age: 25
End: 2020-04-29
Payer: COMMERCIAL

## 2020-04-29 DIAGNOSIS — N30.00 ACUTE CYSTITIS WITHOUT HEMATURIA: Primary | ICD-10-CM

## 2020-04-29 PROCEDURE — 99441 ZZC PHYSICIAN TELEPHONE EVALUATION 5-10 MIN: CPT | Performed by: PHYSICIAN ASSISTANT

## 2020-04-29 RX ORDER — SULFAMETHOXAZOLE/TRIMETHOPRIM 800-160 MG
1 TABLET ORAL 2 TIMES DAILY
Qty: 6 TABLET | Refills: 0 | Status: SHIPPED | OUTPATIENT
Start: 2020-04-29 | End: 2021-01-11

## 2020-04-29 NOTE — PROGRESS NOTES
"Mi Salgado is a 24 year old female who is being evaluated via a billable telephone visit.      The patient has been notified of following:     \"This telephone visit will be conducted via a call between you and your physician/provider. We have found that certain health care needs can be provided without the need for a physical exam.  This service lets us provide the care you need with a short phone conversation.  If a prescription is necessary we can send it directly to your pharmacy.  If lab work is needed we can place an order for that and you can then stop by our lab to have the test done at a later time.    Telephone visits are billed at different rates depending on your insurance coverage. During this emergency period, for some insurers they may be billed the same as an in-person visit.  Please reach out to your insurance provider with any questions.    If during the course of the call the physician/provider feels a telephone visit is not appropriate, you will not be charged for this service.\"    Patient has given verbal consent for Telephone visit?  Yes    How would you like to obtain your AVS? E-Mail (inform patient AVS not encrypted)    Subjective     Mi Salgado is a 24 year old female who presents to clinic today for the following health issues:    HPI  URINARY TRACT SYMPTOMS      Duration: this morning    Description  dysuria, urgency and vaginal discomfort     Intensity:  Moderate +    Accompanying signs and symptoms:  Fever/chills: no   Flank pain no   Nausea and vomiting: YES- Nausea this morning  Vaginal symptoms: discomfort   Abdominal/Pelvic Pain: YES    History  History of frequent UTI's: no   History of kidney stones: no   Sexually Active: YES  Possibility of pregnancy: No    Precipitating or alleviating factors: None    Therapies tried and outcome: OTC uTI medication   Outcome: not effective        Review of Systems   ROS COMP: Constitutional, HEENT, cardiovascular, pulmonary, gi and " gu systems are negative, except as otherwise noted.         Assessment/Plan:  1. Acute cystitis without hematuria    - sulfamethoxazole-trimethoprim (BACTRIM DS) 800-160 MG tablet; Take 1 tablet by mouth 2 times daily  Dispense: 6 tablet; Refill: 0    Return if symptoms worsen or fail to improve.      Phone call duration:  10 minutes    Diallo Eric PA-C

## 2020-04-29 NOTE — PATIENT INSTRUCTIONS
"  Patient Education     Bladder Infection, Female (Adult)    Urine is normally doesn't have any bacteria in it. But bacteria can get into the urinary tract from the skin around the rectum. Or they can travel in the blood from elsewhere in the body. Once they are in your urinary tract, they can cause infection in the urethra (urethritis), the bladder (cystitis), or the kidneys (pyelonephritis).  The most common place for an infection is in the bladder. This is called a bladder infection. This is one of the most common infections in women. Most bladder infections are easily treated. They are not serious unless the infection spreads to the kidney.  The phrases \"bladder infection,\" \"UTI,\" and \"cystitis\" are often used to describe the same thing. But they are not always the same. Cystitis is an inflammation of the bladder. The most common cause of cystitis is an infection.  Symptoms  The infection causes inflammation in the urethra and bladder. This causes many of the symptoms. The most common symptoms of a bladder infection are:    Pain or burning when urinating    Having to urinate more often than usual    Urgent need to urinate    Only a small amount of urine comes out    Blood in urine    Abdominal discomfort. This is usually in the lower abdomen above the pubic bone.    Cloudy urine    Strong- or bad-smelling urine    Unable to urinate (urinary retention)    Unable to hold urine in (urinary incontinence)    Fever    Loss of appetite    Confusion (in older adults)  Causes  Bladder infections are not contagious. You can't get one from someone else, from a toilet seat, or from sharing a bath.  The most common cause of bladder infections is bacteria from the bowels. The bacteria get onto the skin around the opening of the urethra. From there, they can get into the urine and travel up to the bladder, causing inflammation and infection. This usually happens because of:    Wiping improperly after urinating. Always wipe " from front to back.    Bowel incontinence    Pregnancy    Procedures such as having a catheter inserted    Older age    Not emptying your bladder. This can allow bacteria a chance to grow in your urine.    Dehydration    Constipation    Sex    Use of a diaphragm for birth control   Treatment  Bladder infections are diagnosed by a urine test. They are treated with antibiotics and usually clear up quickly without complications. Treatment helps prevent a more serious kidney infection.  Medicines  Medicines can help in the treatment of a bladder infection:    Take antibiotics until they are used up, even if you feel better. It is important to finish them to make sure the infection has cleared.    You can use acetaminophen or ibuprofen for pain, fever, or discomfort, unless another medicine was prescribed. If you have chronic liver or kidney disease, talk with your healthcare provider before using these medicines. Also talk with your provider if you've ever had a stomach ulcer or gastrointestinal bleeding, or are taking blood-thinner medicines.    If you are given phenazopydridine to reduce burning with urination, it will cause your urine to become a bright orange color. This can stain clothing.  Care and prevention  These self-care steps can help prevent future infections:    Drink plenty of fluids to prevent dehydration and flush out your bladder. Do this unless you must restrict fluids for other health reasons, or your doctor told you not to.    Proper cleaning after going to the bathroom is important. Wipe from front to back after using the toilet to prevent the spread of bacteria.    Urinate more often. Don't try to hold urine in for a long time.    Wear loose-fitting clothes and cotton underwear. Avoid tight-fitting pants.    Improve your diet and prevent constipation. Eat more fresh fruit and vegetables, and fiber, and less junk and fatty foods.    Avoid sex until your symptoms are gone.    Avoid caffeine,  alcohol, and spicy foods. These can irritate your bladder.    Urinate right after intercourse to flush out your bladder.    If you use birth control pills and have frequent bladder infections, discuss it with your doctor.  Follow-up care  Call your healthcare provider if all symptoms are not gone after 3 days of treatment. This is especially important if you have repeat infections.  If a culture was done, you will be told if your treatment needs to be changed. If directed, you can call to find out the results.  If X-rays were done, you will be told if the results will affect your treatment.  Call 911  Call 911 if any of the following occur:    Trouble breathing    Hard to wake up or confusion    Fainting or loss of consciousness    Rapid heart rate  When to seek medical advice  Call your healthcare provider right away if any of these occur:    Fever of 100.4 F (38.0 C) or higher, or as directed by your healthcare provider    Symptoms are not better by the third day of treatment    Back or belly (abdominal) pain that gets worse    Repeated vomiting, or unable to keep medicine down    Weakness or dizziness    Vaginal discharge    Pain, redness, or swelling in the outer vaginal area (labia)  Date Last Reviewed: 10/1/2016    8343-8248 [employer]. [address] [place] [address], [place] [address]. All rights reserved. This information is not intended as a substitute for professional medical care. Always follow your healthcare professional's instructions.

## 2020-04-29 NOTE — PROGRESS NOTES
"Mi Salgado is a 24 year old female who is being evaluated via a billable video visit.      The patient has been notified of following:     \"This video visit will be conducted via a call between you and your physician/provider. We have found that certain health care needs can be provided without the need for an in-person physical exam.  This service lets us provide the care you need with a video conversation.  If a prescription is necessary we can send it directly to your pharmacy.  If lab work is needed we can place an order for that and you can then stop by our lab to have the test done at a later time.    Video visits are billed at different rates depending on your insurance coverage.  Please reach out to your insurance provider with any questions.    If during the course of the call the physician/provider feels a video visit is not appropriate, you will not be charged for this service.\"    Patient has given verbal consent for Video visit? Yes    How would you like to obtain your AVS? E-Mail (inform patient AVS not encrypted)    Patient would like the video invitation sent by: Text to cell phone: [phone]    Will anyone else be joining your video visit? No    {If patient encounters technical issues they should call [phone] :549701}    Subjective     Mi Salgado is a 24 year old female who presents to clinic today for the following health issues:    HPI  URINARY TRACT SYMPTOMS      Duration: this morning    Description  dysuria, urgency and vaginal discomfort     Intensity:  Moderate +    Accompanying signs and symptoms:  Fever/chills: no   Flank pain no   Nausea and vomiting: YES- Nausea this morning  Vaginal symptoms: discomfort   Abdominal/Pelvic Pain: YES    History  History of frequent UTI's: no   History of kidney stones: no   Sexually Active: YES  Possibility of pregnancy: No    Precipitating or alleviating factors: None    Therapies tried and outcome: OTC uTI medication   Outcome: not " "effective    {PEDS Chronic and Acute Problems (Optional):577648}     Video Start Time: {video visit start/end time for provider to select:385326}    {additonal problems for provider to add (Optional):164071}    {HIST REVIEW/ LINKS 2 (Optional):476854}    Reviewed and updated as needed this visit by Provider         Review of Systems   {ROS COMP (Optional):945423}      Objective    There were no vitals taken for this visit.  Estimated body mass index is 28.46 kg/m  as calculated from the following:    Height as of 4/17/19: 1.67 m (5' 5.75\").    Weight as of 4/17/19: 79.4 kg (175 lb).  Physical Exam     {video visit exam brief selected:259859::\"GENERAL: healthy, alert and no distress\",\"EYES: Eyes grossly normal to inspection, conjunctivae and sclerae normal\",\"RESP: no audible wheeze, cough, or visible cyanosis.  No visible retractions or increased work of breathing.  Able to speak fully in complete sentences.\",\"NEURO: Cranial nerves grossly intact, mentation intact and speech normal\",\"PSYCH: mentation appears normal, affect normal/bright, judgement and insight intact, normal speech and appearance well-groomed\"}      {Diagnostic Test Results (Optional):225384::\"Diagnostic Test Results:\",\"Labs reviewed in Epic\"}        {PROVIDER CHARTING PREFERENCE:436403}      Video-Visit Details    Type of service:  Video Visit    Video End Time:{video visit start/end time for provider to select:029296}    Originating Location (pt. Location): {video visit patient location:588311::\"Home\"}    Distant Location (provider location):  [facility]     Mode of Communication:  Video Conference via Zero[employer]    No follow-ups on file.       {signature options:061064}      "

## 2020-05-20 ENCOUNTER — NURSE TRIAGE (OUTPATIENT)
Dept: NURSING | Facility: CLINIC | Age: 25
End: 2020-05-20

## 2020-05-20 ENCOUNTER — VIRTUAL VISIT (OUTPATIENT)
Dept: FAMILY MEDICINE | Facility: CLINIC | Age: 25
End: 2020-05-20
Payer: COMMERCIAL

## 2020-05-20 ENCOUNTER — TELEPHONE (OUTPATIENT)
Dept: FAMILY MEDICINE | Facility: CLINIC | Age: 25
End: 2020-05-20

## 2020-05-20 DIAGNOSIS — Z11.3 SCREEN FOR STD (SEXUALLY TRANSMITTED DISEASE): ICD-10-CM

## 2020-05-20 DIAGNOSIS — N76.0 BV (BACTERIAL VAGINOSIS): ICD-10-CM

## 2020-05-20 DIAGNOSIS — N89.8 VAGINAL DISCHARGE: ICD-10-CM

## 2020-05-20 DIAGNOSIS — B96.89 BV (BACTERIAL VAGINOSIS): ICD-10-CM

## 2020-05-20 DIAGNOSIS — N89.8 VAGINAL DISCHARGE: Primary | ICD-10-CM

## 2020-05-20 LAB
ALBUMIN UR-MCNC: NEGATIVE MG/DL
APPEARANCE UR: CLEAR
BILIRUB UR QL STRIP: NEGATIVE
COLOR UR AUTO: YELLOW
GLUCOSE UR STRIP-MCNC: NEGATIVE MG/DL
HCG UR QL: NEGATIVE
HGB UR QL STRIP: NEGATIVE
KETONES UR STRIP-MCNC: NEGATIVE MG/DL
LEUKOCYTE ESTERASE UR QL STRIP: NEGATIVE
NITRATE UR QL: NEGATIVE
PH UR STRIP: 8 PH (ref 5–7)
SOURCE: ABNORMAL
SP GR UR STRIP: 1.01 (ref 1–1.03)
SPECIMEN SOURCE: ABNORMAL
UROBILINOGEN UR STRIP-ACNC: 0.2 EU/DL (ref 0.2–1)
WET PREP SPEC: ABNORMAL

## 2020-05-20 PROCEDURE — 81025 URINE PREGNANCY TEST: CPT | Performed by: FAMILY MEDICINE

## 2020-05-20 PROCEDURE — 81003 URINALYSIS AUTO W/O SCOPE: CPT | Performed by: FAMILY MEDICINE

## 2020-05-20 PROCEDURE — 87389 HIV-1 AG W/HIV-1&-2 AB AG IA: CPT | Performed by: FAMILY MEDICINE

## 2020-05-20 PROCEDURE — 86803 HEPATITIS C AB TEST: CPT | Performed by: FAMILY MEDICINE

## 2020-05-20 PROCEDURE — 87491 CHLMYD TRACH DNA AMP PROBE: CPT | Performed by: FAMILY MEDICINE

## 2020-05-20 PROCEDURE — 86780 TREPONEMA PALLIDUM: CPT | Performed by: FAMILY MEDICINE

## 2020-05-20 PROCEDURE — 36415 COLL VENOUS BLD VENIPUNCTURE: CPT | Performed by: FAMILY MEDICINE

## 2020-05-20 PROCEDURE — 99213 OFFICE O/P EST LOW 20 MIN: CPT | Mod: 95 | Performed by: FAMILY MEDICINE

## 2020-05-20 PROCEDURE — 87340 HEPATITIS B SURFACE AG IA: CPT | Performed by: FAMILY MEDICINE

## 2020-05-20 PROCEDURE — 87591 N.GONORRHOEAE DNA AMP PROB: CPT | Performed by: FAMILY MEDICINE

## 2020-05-20 PROCEDURE — 87210 SMEAR WET MOUNT SALINE/INK: CPT | Performed by: FAMILY MEDICINE

## 2020-05-20 RX ORDER — METRONIDAZOLE 500 MG/1
500 TABLET ORAL 2 TIMES DAILY
Qty: 14 TABLET | Refills: 0 | Status: SHIPPED | OUTPATIENT
Start: 2020-05-20 | End: 2020-05-27

## 2020-05-20 NOTE — PATIENT INSTRUCTIONS
Please make lab appointment    You are also  due for pap smear-Please make appointment   Florence Ireland MD

## 2020-05-20 NOTE — TELEPHONE ENCOUNTER
Patient notified of Provider's message as written.  Patient verbalized understanding.    Jeremiah Parmar RN

## 2020-05-20 NOTE — TELEPHONE ENCOUNTER
"Mi wanting to make OV appt, believes she may have BV.  Reports recently being treated for a suspected UTI with abx, and symptoms did improve.  Now having vague sensation of vagina feeling \"weird\" as well as \"pinkish\" vaginal discharge, after finding retained partial condom which was in for 7 days.  Triaged to be seen within 3 days, transferred to scheduling.            Additional Information    Negative: [1] Mild lower abdominal pain comes and goes (cramps) AND [2] lasts > 24 hours    Negative: Genital area looks infected (e.g., draining sore, spreading redness)    Negative: [1] Rash is tiny water blisters AND [2] 3 or more    Negative: [1] Rash (e.g., redness, tiny bumps, sore) of genital area AND [2] present > 24 hours    Negative: [1] Yellow or green vaginal discharge AND [2] fever    Negative: [1] Genital area looks infected (e.g., draining sore, spreading redness) AND [2] fever    Negative: [1] Constant abdominal pain AND [2] present > 2 hours    Negative: Patient sounds very sick or weak to the triager    Negative: [1] SEVERE abdominal pain (e.g., excruciating) AND [2] present > 1 hour    Negative: [1] Symptoms of a \"yeast infection\" (i.e., itchy, white discharge, not bad smelling) AND [2] not improved > 3 days following CARE ADVICE    Negative: Abnormal color vaginal discharge (i.e., yellow, green, gray)    Negative: Bad smelling vaginal discharge    Negative: Diabetes mellitus or weak immune system (e.g., HIV positive, cancer chemo, splenectomy, organ transplant, chronic steroids)    Negative: Patient is worried about sexually transmitted disease (STD)    Negative: Pain with sexual intercourse (dyspareunia) (Exception: vaginal yeast infection suspected)    4 or more episodes of vaginal infection in past year     2 in past year    Protocols used: VAGINAL [id number]    COVID 19 Nurse Triage Plan/Patient Instructions    Please be aware that novel coronavirus (COVID-19) may be circulating in the " community. If you develop symptoms such as fever, cough, or SOB or if you have concerns about the presence of another infection including coronavirus (COVID-19), please contact your health care provider or visit [email or web address].     Disposition/Instructions    Patient to have scheduled Telephone Visit with a provider. Follow System Ambulatory Workflow for COVID 19.     The clinic staff will assist you to schedule an appointment to complete the Telephone Visit with a provider during normal clinic hours.       Call Back If: Your symptoms worsen before you are able to complete your Telephone Visit with a provider.    Thank you for limiting contact with others, wearing a simple mask to cover your cough, practice good hand hygiene habits and accessing our virtual services where possible to limit the spread of this virus.    For more information about COVID19 and options for caring for yourself at home, please visit the CDC website at https://www.cdc.gov/coronavirus/2019-ncov/about/steps-when-sick.html  For more options for care at [facility], please visit our website at [email or web address]    For more information, please use the [place] Department of Health COVID-19 Website: [email or web address].health.Counts include 234 beds at the [facility].[place]./diseases/coronavirus/index.html  [place] Department of Health ([facility]) COVID-19 Hotlines (Interpreters available):      Health questions: Phone Number: [phone] or [phone] and Hours: 7 a.m. to 7 p.m.    Schools and  questions: Phone Number: [phone] or [phone] and Hours 7 a.m. to 7 p.m.

## 2020-05-20 NOTE — PROGRESS NOTES
"Mi Salgado is a 24 year old female who is being evaluated via a billable telephone visit.      The patient has been notified of following:     \"This telephone visit will be conducted via a call between you and your physician/provider. We have found that certain health care needs can be provided without the need for a physical exam.  This service lets us provide the care you need with a short phone conversation.  If a prescription is necessary we can send it directly to your pharmacy.  If lab work is needed we can place an order for that and you can then stop by our lab to have the test done at a later time.    Telephone visits are billed at different rates depending on your insurance coverage. During this emergency period, for some insurers they may be billed the same as an in-person visit.  Please reach out to your insurance provider with any questions.    If during the course of the call the physician/provider feels a telephone visit is not appropriate, you will not be charged for this service.\"    Patient has given verbal consent for Telephone visit?  Yes    What phone number would you like to be contacted at? [phone]  8:04 AM-start visit  How would you like to obtain your AVS? Rinhart    Subjective     Mi Salgado is a 24 year old female who presents via phone visit today for the following health issues:    HPI  Vaginal Symptoms      Duration: 1 day-History of BV    Description  vaginal discharge - grey  No odor  Not itchy    Intensity:  mild    Accompanying signs and symptoms (fever/dysuria/abdominal or back pain): lower abdomen and lower back pain yesterday but not this morning.    History  Sexually active: yes, single partner, contraception not required  Possibility of pregnancy: No  Recent antibiotic use: YES- took Bactrim DS on 04/29/2020    Precipitating or alleviating factors: None    Therapies tried and outcome: none   Outcome: none    LMP-end of April,2020    Not on any BCP    Condom " got stuck 3 weeks ago    Was Given antibiotic for possible cystitis    No pelvic pain    Not on any contraception and does not want any            Patient Active Problem List   Diagnosis     Severe episode of recurrent major depressive disorder, without psychotic features (H)     Oppositional defiant disorder     Cluster B personality disorder (H)     ADHD (attention deficit hyperactivity disorder)     Tobacco use disorder     Genital herpes     Anovulation     PCOS (polycystic ovarian syndrome)     Anxiety     Hyperlipidemia with target LDL less than 160     Bipolar 2 disorder, major depressive episode (H)     Chronic post-traumatic stress disorder (PTSD)     Severe cannabis use disorder (H)     Past Surgical History:   Procedure Laterality Date     NO HISTORY OF SURGERY         Social History     Tobacco Use     Smoking status: Current Every Day Smoker     Packs/day: 0.25     Types: Cigarettes     Last attempt to quit: 3/16/2011     Years since quittin.1     Smokeless tobacco: Never Used   Substance Use Topics     Alcohol use: No     Alcohol/week: 0.0 standard drinks     Family History   Problem Relation Age of Onset     Depression Other          Current Outpatient Medications   Medication Sig Dispense Refill     ARIPiprazole (ABILIFY) 10 MG tablet Take 10 mg by mouth daily       docosanol (ABREVA) 10 % CREA cream Apply 1 Application topically 5 times daily       fluticasone (FLONASE) 50 MCG/ACT spray Spray 1-2 sprays into both nostrils daily (Patient not taking: Reported on 10/14/2019) 1 Bottle 1     lisdexamfetamine (VYVANSE) 70 MG capsule Take 1 capsule (70 mg) by mouth every morning (Patient not taking: Reported on 10/14/2019) 30 capsule 0     sulfamethoxazole-trimethoprim (BACTRIM DS) 800-160 MG tablet Take 1 tablet by mouth 2 times daily (Patient not taking: Reported on 2020) 6 tablet 0     tiZANidine (ZANAFLEX) 4 MG tablet TK ONE T PO QHS  0     Allergies   Allergen Reactions     Latex      No  Known Allergies      Recent Labs   Lab Test 09/07/17  1612 12/03/12  0057   ALT  --  28   CR  --  0.68   GFRESTIMATED  --  >90   GFRESTBLACK  --  >90   POTASSIUM  --  3.6   TSH 0.78  --       BP Readings from Last 3 Encounters:   10/14/19 124/85   04/17/19 122/64   09/21/18 120/79    Wt Readings from Last 3 Encounters:   04/17/19 79.4 kg (175 lb)   09/21/18 81.5 kg (179 lb 11.2 oz)   08/27/18 78.9 kg (174 lb)                    Reviewed and updated as needed this visit by Provider  Tobacco  Allergies  Meds  Problems  Med Hx  Surg Hx  Fam Hx         Review of Systems   CONSTITUTIONAL: NEGATIVE for fever, chills, change in weight  RESP: NEGATIVE for significant cough or SOB  CV: NEGATIVE for chest pain, palpitations or peripheral edema  GI: NEGATIVE for nausea, abdominal pain, heartburn, or change in bowel habits  : normal menstrual cycles       Objective   Reported vitals:  There were no vitals taken for this visit.   healthy, alert and no distress  PSYCH: Alert and oriented times 3; coherent speech, normal   rate and volume, able to articulate logical thoughts, able   to abstract reason, no tangential thoughts, no hallucinations   or delusions  Her affect is normal  RESP: No cough, no audible wheezing, able to talk in full sentences  Remainder of exam unable to be completed due to telephone visits    Diagnostic Test Results:  Labs reviewed in Epic  Pending         Assessment/Plan:  1. Vaginal discharge  Pt wants STD test done  Pt will make lab only appointment   Info on safe sex  - Hepatitis C Screen Reflex to HCV RNA Quant and Genotype; Future  - NEISSERIA GONORRHOEA PCR; Future  - CHLAMYDIA TRACHOMATIS PCR; Future  - Hepatitis B surface antigen; Future  - HIV Antigen Antibody Combo; Future  - Treponema Abs w Reflex to RPR and Titer; Future  - Wet prep; Future  - HCG Qual, Urine ([id number]); Future  - *UA reflex to Microscopic and Culture ([place] and [facility] (except [place] and [place]);  Future    2. Screen for STD (sexually transmitted disease)  As above  - Hepatitis C Screen Reflex to HCV RNA Quant and Genotype; Future  - NEISSERIA GONORRHOEA PCR; Future  - CHLAMYDIA TRACHOMATIS PCR; Future  - Hepatitis B surface antigen; Future  - HIV Antigen Antibody Combo; Future  - Treponema Abs w Reflex to RPR and Titer; Future  - Wet prep; Future  - HCG Qual, Urine ([id number]); Future  - *UA reflex to Microscopic and Culture ([place] and [facility] (except [place] and [place]); Future  Discussed contraception  Pt not interested in contraception  Uses condoms  8:15 AM    Return in about 2 months (around 7/20/2020) for Physical Exam.      Phone call duration:  As above     Florence Ireland MD

## 2020-05-20 NOTE — TELEPHONE ENCOUNTER
Called patient. LVM to call RN hotline ([phone]).       Notes recorded by Florence Ireland MD on 5/20/2020 at 12:22 PM CDT   Wet prep show clue cells-indicative of Bacterial vaginosis   Urine normal   Pregnancy test negative   Take Flagyl x 7 days   You cannot drink any alcohol with this   Follow up if not better   Florence Ireland MD

## 2020-05-20 NOTE — TELEPHONE ENCOUNTER
5/20/2020 STD tests are pending  Patient request a phone call once results are back  Advised results may take 2-4 business days    Jeremiah Parmar RN

## 2020-05-21 LAB
C TRACH DNA SPEC QL NAA+PROBE: NEGATIVE
HBV SURFACE AG SERPL QL IA: NONREACTIVE
HCV AB SERPL QL IA: NONREACTIVE
HIV 1+2 AB+HIV1 P24 AG SERPL QL IA: NONREACTIVE
N GONORRHOEA DNA SPEC QL NAA+PROBE: NEGATIVE
SPECIMEN SOURCE: NORMAL
SPECIMEN SOURCE: NORMAL
T PALLIDUM AB SER QL: NONREACTIVE

## 2020-05-22 NOTE — TELEPHONE ENCOUNTER
Patient notified of Provider's message as written.  Patient verbalized understanding.    Jeremiah Parmar RN    Written by Florence Ireland MD on 5/21/2020  1:25 PM   All other STD test are negative   Florence Ireland MD

## 2020-09-10 ENCOUNTER — VIRTUAL VISIT (OUTPATIENT)
Dept: FAMILY MEDICINE | Facility: CLINIC | Age: 25
End: 2020-09-10
Payer: COMMERCIAL

## 2020-09-10 DIAGNOSIS — Z72.0 TOBACCO ABUSE: Primary | ICD-10-CM

## 2020-09-10 PROCEDURE — 99213 OFFICE O/P EST LOW 20 MIN: CPT | Mod: 95 | Performed by: PHYSICIAN ASSISTANT

## 2020-09-10 RX ORDER — BUPROPION HYDROCHLORIDE 75 MG/1
75 TABLET ORAL 2 TIMES DAILY
Qty: 60 TABLET | Refills: 1 | Status: SHIPPED | OUTPATIENT
Start: 2020-09-10 | End: 2020-10-15

## 2020-09-10 NOTE — PROGRESS NOTES
"Mi Salgado is a 25 year old female who is being evaluated via a billable telephone visit.      The patient has been notified of following:     \"This telephone visit will be conducted via a call between you and your physician/provider. We have found that certain health care needs can be provided without the need for a physical exam.  This service lets us provide the care you need with a short phone conversation.  If a prescription is necessary we can send it directly to your pharmacy.  If lab work is needed we can place an order for that and you can then stop by our lab to have the test done at a later time.    Telephone visits are billed at different rates depending on your insurance coverage. During this emergency period, for some insurers they may be billed the same as an in-person visit.  Please reach out to your insurance provider with any questions.    If during the course of the call the physician/provider feels a telephone visit is not appropriate, you will not be charged for this service.\"    Patient has given verbal consent for Telephone visit?  Yes    What phone number would you like to be contacted at? [phone]    How would you like to obtain your AVS? Rinhart    Subjective     Mi Salgado is a 25 year old female who presents via phone visit today for the following health issues:    HPI    Patient presents with:  Smoking Cessation    Patient amenable to Bupropion trial as she has concurrent ADHD.  QuitPlan referral drafted.     Review of Systems   Constitutional, HEENT, cardiovascular, pulmonary, gi and gu systems are negative, except as otherwise noted.       Objective          Vitals:  No vitals were obtained today due to virtual visit.    healthy, alert and no distress  PSYCH: Alert and oriented times 3; coherent speech, normal   rate and volume, able to articulate logical thoughts, able   to abstract reason, no tangential thoughts, no hallucinations   or delusions  Her affect is " normal  RESP: No cough, no audible wheezing, able to talk in full sentences  Remainder of exam unable to be completed due to telephone visits            Assessment/Plan:    Assessment & Plan     Mi was seen today for smoking cessation.    Diagnoses and all orders for this visit:    Tobacco abuse  -     buPROPion (WELLBUTRIN) 75 MG tablet; Take 1 tablet (75 mg) by mouth 2 times daily  -     QUIT PARTNER (TOBACCO CESSATION) REFERRAL         Tobacco Cessation:   reports that she has been smoking cigarettes. She has been smoking about 0.25 packs per day. She has never used smokeless tobacco.  Tobacco Cessation Action Plan: Phone counseling: Place order for Tobacco Cessation Referral  Pharmacotherapies : Zyban/Wellbutrin        Return in about 4 weeks (around 10/8/2020) for Phone Visit.    Diallo Eric PA-C  [facility]    Phone call duration:  15 minutes

## 2020-09-11 ENCOUNTER — TRANSFERRED RECORDS (OUTPATIENT)
Dept: HEALTH INFORMATION MANAGEMENT | Facility: CLINIC | Age: 25
End: 2020-09-11

## 2020-10-01 ENCOUNTER — OFFICE VISIT (OUTPATIENT)
Dept: FAMILY MEDICINE | Facility: CLINIC | Age: 25
End: 2020-10-01
Payer: COMMERCIAL

## 2020-10-01 VITALS
RESPIRATION RATE: 14 BRPM | SYSTOLIC BLOOD PRESSURE: 116 MMHG | TEMPERATURE: 97.9 F | DIASTOLIC BLOOD PRESSURE: 64 MMHG | HEART RATE: 93 BPM | OXYGEN SATURATION: 97 %

## 2020-10-01 DIAGNOSIS — B96.89 BV (BACTERIAL VAGINOSIS): ICD-10-CM

## 2020-10-01 DIAGNOSIS — R30.0 DYSURIA: Primary | ICD-10-CM

## 2020-10-01 DIAGNOSIS — N76.0 BV (BACTERIAL VAGINOSIS): ICD-10-CM

## 2020-10-01 LAB
ALBUMIN UR-MCNC: NEGATIVE MG/DL
APPEARANCE UR: CLEAR
BILIRUB UR QL STRIP: NEGATIVE
COLOR UR AUTO: YELLOW
GLUCOSE UR STRIP-MCNC: NEGATIVE MG/DL
HCG UR QL: NEGATIVE
HGB UR QL STRIP: NEGATIVE
KETONES UR STRIP-MCNC: NEGATIVE MG/DL
LEUKOCYTE ESTERASE UR QL STRIP: NEGATIVE
NITRATE UR QL: NEGATIVE
PH UR STRIP: 7.5 PH (ref 5–7)
SOURCE: ABNORMAL
SP GR UR STRIP: 1.02 (ref 1–1.03)
SPECIMEN SOURCE: ABNORMAL
UROBILINOGEN UR STRIP-ACNC: 1 EU/DL (ref 0.2–1)
WET PREP SPEC: ABNORMAL

## 2020-10-01 PROCEDURE — 87210 SMEAR WET MOUNT SALINE/INK: CPT | Performed by: FAMILY MEDICINE

## 2020-10-01 PROCEDURE — 81025 URINE PREGNANCY TEST: CPT | Performed by: FAMILY MEDICINE

## 2020-10-01 PROCEDURE — 99214 OFFICE O/P EST MOD 30 MIN: CPT | Performed by: FAMILY MEDICINE

## 2020-10-01 PROCEDURE — 81003 URINALYSIS AUTO W/O SCOPE: CPT | Performed by: FAMILY MEDICINE

## 2020-10-01 RX ORDER — L. ACIDOPHILUS/L.BULGARICUS 1MM CELL
1 TABLET ORAL DAILY
Qty: 30 TABLET | Refills: 3 | Status: SHIPPED | OUTPATIENT
Start: 2020-10-01 | End: 2021-01-11

## 2020-10-01 RX ORDER — METRONIDAZOLE 500 MG/1
500 TABLET ORAL 2 TIMES DAILY
Qty: 28 TABLET | Refills: 0 | Status: SHIPPED | OUTPATIENT
Start: 2020-10-01 | End: 2020-10-15

## 2020-10-01 NOTE — PROGRESS NOTES
Subjective     Mi Salgado is a 25 year old female who presents to clinic today for the following health issues:    HPI         Abdominal/Flank Pain  Onset/Duration: 1 day  Description:   Character: Dull ache  Location: pelvic region  Radiation: Back  Intensity: mild  Progression of Symptoms:  same  Accompanying Signs & Symptoms: Vaginal discharge ( white/creamy)  Fever/Chills: no  Gas/Bloating: no  Nausea: no  Vomitting: no  Diarrhea: no  Constipation: no  Dysuria or Hematuria: no  History:   Trauma: no  Previous similar pain: no  Previous tests done: none  Precipitating factors:   Does the pain change with:     Food: YES- pressure    Bowel Movement: YES- pressure    Urination: YES- pressure   Other factors:  YES- laughing, sitting down fast  Therapies tried and outcome: None  No LMP recorded.    Dull pain in lower abdomen  Worse with sitting  Has had symptoms before but worse now  Had some vaginal discharge this morning, large amount, no foul odor  No dysuria        Review of Systems   Constitutional, HEENT, cardiovascular, pulmonary, gi and gu systems are negative, except as otherwise noted.      Objective    /64   Pulse 93   Temp 97.9  F (36.6  C) (Oral)   Resp 14   SpO2 97%   There is no height or weight on file to calculate BMI.  Physical Exam   GENERAL: healthy, alert and no distress  EYES: Eyes grossly normal to inspection, PERRL and conjunctivae and sclerae normal  NECK: no adenopathy, no asymmetry, masses, or scars and thyroid normal to palpation  SKIN: no suspicious lesions or rashes  PSYCH: mentation appears normal, affect normal/bright          Assessment & Plan       ICD-10-CM    1. Dysuria  R30.0 UA reflex to Microscopic and Culture     HCG Qual, Urine ([id number])   2. BV (bacterial vaginosis)  N76.0 Wet prep    B96.89 Lactobacillus Probiotic TABS     metroNIDAZOLE (FLAGYL) 500 MG tablet                 Return in about 2 weeks (around 10/15/2020) for If symptoms persist.    Guille  MD Yadira  [facility]

## 2020-10-06 ENCOUNTER — TELEPHONE (OUTPATIENT)
Dept: FAMILY MEDICINE | Facility: CLINIC | Age: 25
End: 2020-10-06

## 2020-10-06 NOTE — TELEPHONE ENCOUNTER
Attempted call to pt and received a message that says that the person you are calling cannot accept calls at this time, we are sorry for any inconvenience this may have caused.  Will try again later.    Fatimah Khanna RN  [facility]

## 2020-10-06 NOTE — TELEPHONE ENCOUNTER
Reason for Call:  Other prescription    Detailed comments: Patient calling, states she would like to know why she was prescribed Lactobacillus Probiotic TABS. When she went in for Vaginal Discharge. Please call back to advise.    Phone Number Patient can be reached at: Other phone number:  [phone]    Best Time: any    Can we leave a detailed message on this number? YES    Call taken on 10/6/2020 at 2:10 PM by Armando Burnett

## 2020-10-07 NOTE — TELEPHONE ENCOUNTER
Dr. aKur- Pt has questions regarding the Rx that was sent for Lactobacillus Probiotic TABS. She does not understand what it is for or why she should be taking it. The pharmacist told her that they are confused why it was prescribed for BV. She states that she picked up the prescription brought it home and threw it away because she does not want to be taking something that she should not be. Please advise. She did  and take the flagyl.     Pt would like to know what she was tested for at her visit 10/01. Notified her of lab tests that were ordered. She wanted to know if any testing for STI's were included. Notified her that STI testing was not done. Pt states that her results were reviewed with provider at her visit.

## 2020-10-07 NOTE — TELEPHONE ENCOUNTER
Patient calling back to check on status of message, phone number she gave when she sent first message is not working gave new number to call of [phone]

## 2020-10-08 NOTE — TELEPHONE ENCOUNTER
All of this was discussed during the visit.  Probiotics were prescribed to help prevent BV recurrence as she has had multiple episodes.  She was actually interested in ways to prevent BV, we literally talked forever about why she should try probiotics.    Guille May MD

## 2020-10-08 NOTE — TELEPHONE ENCOUNTER
"Reviewed Provider's message with pt. She stated \"why would the pharmacist tell me they are not for BV then?\" Pt then said she is not going to take them. Pt had no further questions.    Fatimah Khanna RN  [facility] [place]    "

## 2020-10-14 NOTE — PROGRESS NOTES
"Mi Salgado is a 25 year old female who is being evaluated via a billable video visit.      The patient has been notified of following:     \"This video visit will be conducted via a call between you and your physician/provider. We have found that certain health care needs can be provided without the need for an in-person physical exam.  This service lets us provide the care you need with a video conversation.  If a prescription is necessary we can send it directly to your pharmacy.  If lab work is needed we can place an order for that and you can then stop by our lab to have the test done at a later time.    Video visits are billed at different rates depending on your insurance coverage.  Please reach out to your insurance provider with any questions.    If during the course of the call the physician/provider feels a video visit is not appropriate, you will not be charged for this service.\"    Patient has given verbal consent for Video visit? Yes  How would you like to obtain your AVS? MyChart  If you are dropped from the video visit, the video invite should be resent to: Text to cell phone: [phone]  Will anyone else be joining your video visit? No      Subjective     Mi Salgado is a 25 year old female who presents today via video visit for the following health issues:  Pt used to take Vyvanse for ADD  At  afairly high dose -she has Not taken this for over a year  Pt saw Dr Mercado   Pt is a Mental Health specialist  Pt was in a hard 5 year relationship  Pt has a hard Time finishing  Pt says she does not Like the High dose of Vyvanse as It makes her Jittery  She did not take wellbutrin  and is not on abilify  See PHQ9 and RACHEAL  Pt has a hard time completing tasks and staying focused  She would Like to start at a Lower dose      Anxiety Follow-Up    How are you doing with your anxiety since your last visit? same    Are you having other symptoms that might be associated with anxiety? Yes:  see " RACHEAL    Have you had a significant life event? Broke up with boyfriend    Are you feeling depressed? No    Do you have any concerns with your use of alcohol or other drugs? No    Social History     Tobacco Use     Smoking status: Current Every Day Smoker     Packs/day: 0.25     Types: Cigarettes     Last attempt to quit: 3/16/2011     Years since quittin.5     Smokeless tobacco: Never Used   Substance Use Topics     Alcohol use: No     Alcohol/week: 0.0 standard drinks     Drug use: No     Comment: marijuana     RACHEAL-7 SCORE 2018 2018 10/15/2020   Total Score - - -   Total Score 14 17 7     PHQ 2018 2018 10/15/2020   PHQ-9 Total Score 11 9 4   Q9: Thoughts of better off dead/self-harm past 2 weeks Not at all Not at all Not at all     Last PHQ-9 10/15/2020   1.  Little interest or pleasure in doing things 0   2.  Feeling down, depressed, or hopeless 0   3.  Trouble falling or staying asleep, or sleeping too much 0   4.  Feeling tired or having little energy 0   5.  Poor appetite or overeating 0   6.  Feeling bad about yourself 0   7.  Trouble concentrating 2   8.  Moving slowly or restless 2   Q9: Thoughts of better off dead/self-harm past 2 weeks 0   PHQ-9 Total Score 4   Difficulty at work, home, or with people Not difficult at all     RACHEAL-7  10/15/2020   1. Feeling nervous, anxious, or on edge 1   2. Not being able to stop or control worrying 1   3. Worrying too much about different things 1   4. Trouble relaxing 1   5. Being so restless that it is hard to sit still 1   6. Becoming easily annoyed or irritable 1   7. Feeling afraid, as if something awful might happen 1   RACHEAL-7 Total Score 7     HPI     Patient presents with:  A.D.H.D    Video Start Time: 2-15 PM  Review of Systems   Rest of the ROS is Negative except see above and Problem list [stable]        Objective           Vitals:  No vitals were obtained today due to virtual visit.    Physical Exam     GENERAL: Healthy, alert and no  distress  EYES: Eyes grossly normal to inspection.  No discharge or erythema, or obvious scleral/conjunctival abnormalities.  RESP: No audible wheeze, cough, or visible cyanosis.  No visible retractions or increased work of breathing.    SKIN: Visible skin clear. No significant rash, abnormal pigmentation or lesions.  NEURO: Cranial nerves grossly intact.  Mentation and speech appropriate for age.  PSYCH: Mentation appears normal, affect normal/bright, judgement and insight intact, normal speech and appearance well-groomed.      Pending         Assessment & Plan     Mi was seen today for a.d.h.d.    Diagnoses and all orders for this visit:    Attention deficit disorder, unspecified hyperactivity presence  -     lisdexamfetamine (VYVANSE) 10 MG capsule; Take 1 capsule (10 mg) by mouth daily  -     lisdexamfetamine (VYVANSE) 10 MG capsule; Take 1 capsule (10 mg) by mouth daily  -     lisdexamfetamine (VYVANSE) 10 MG capsule; Take 1 capsule (10 mg) by mouth daily  -     MENTAL HEALTH REFERRAL  - Adult; Outpatient Treatment; Individual/Couples/Family/Group Therapy/Health Psychology; [facility]: [facility] [phone]; We will contact you to schedule the appointment or please call with any questions    Bipolar 2 disorder, major depressive episode (H)  -     MENTAL HEALTH REFERRAL  - Adult; Outpatient Treatment; Individual/Couples/Family/Group Therapy/Health Psychology; [facility]: [facility] [phone]; We will contact you to schedule the appointment or please call with any questions    Anxiety  -     MENTAL HEALTH REFERRAL  - Adult; Outpatient Treatment; Individual/Couples/Family/Group Therapy/Health Psychology; [facility]: [facility] [phone]; We will contact you to schedule the appointment or please call with any questions    Cluster B personality disorder (H)  -     MENTAL HEALTH REFERRAL  - Adult; Outpatient Treatment; Individual/Couples/Family/Group Therapy/Health  Psychology; [facility]: [facility] [phone]; We will contact you to schedule the appointment or please call with any questions    pt just wants meds for ADD  She would Like to start with a Lower dose  Discussed side effects  Pt denies any depression  Broke up recently from a relationship  Follow up 3 months  Advised schedule pap    pt understands this medicine is addictive        Return in about 2 weeks (around 10/29/2020) for Physical Exam.    Florence Ireland MD  [facility]      Video-Visit Details    Type of service:  Video Visit    Video End Time:2-25 PM    Originating Location (pt. Location): Home      Distant Location (provider location):  [facility]     Platform used for Video Visit: Doximity      2:37 PM-visit completed

## 2020-10-15 ENCOUNTER — VIRTUAL VISIT (OUTPATIENT)
Dept: FAMILY MEDICINE | Facility: CLINIC | Age: 25
End: 2020-10-15
Payer: COMMERCIAL

## 2020-10-15 DIAGNOSIS — F31.81 BIPOLAR 2 DISORDER, MAJOR DEPRESSIVE EPISODE (H): ICD-10-CM

## 2020-10-15 DIAGNOSIS — F41.9 ANXIETY: ICD-10-CM

## 2020-10-15 DIAGNOSIS — F60.89 CLUSTER B PERSONALITY DISORDER (H): ICD-10-CM

## 2020-10-15 DIAGNOSIS — F98.8 ATTENTION DEFICIT DISORDER, UNSPECIFIED HYPERACTIVITY PRESENCE: Primary | ICD-10-CM

## 2020-10-15 PROCEDURE — 99214 OFFICE O/P EST MOD 30 MIN: CPT | Mod: 95 | Performed by: FAMILY MEDICINE

## 2020-10-15 RX ORDER — LISDEXAMFETAMINE DIMESYLATE 10 MG/1
10 CAPSULE ORAL DAILY
Qty: 30 CAPSULE | Refills: 0 | Status: SHIPPED | OUTPATIENT
Start: 2020-11-15 | End: 2020-12-15

## 2020-10-15 RX ORDER — LISDEXAMFETAMINE DIMESYLATE 10 MG/1
10 CAPSULE ORAL DAILY
Qty: 30 CAPSULE | Refills: 0 | Status: SHIPPED | OUTPATIENT
Start: 2020-10-15 | End: 2020-11-14

## 2020-10-15 RX ORDER — LISDEXAMFETAMINE DIMESYLATE 10 MG/1
10 CAPSULE ORAL DAILY
Qty: 30 CAPSULE | Refills: 0 | Status: SHIPPED | OUTPATIENT
Start: 2020-12-16 | End: 2021-01-11

## 2020-10-15 ASSESSMENT — PATIENT HEALTH QUESTIONNAIRE - PHQ9
5. POOR APPETITE OR OVEREATING: SEVERAL DAYS
SUM OF ALL RESPONSES TO PHQ QUESTIONS 1-9: 4

## 2020-10-15 ASSESSMENT — ANXIETY QUESTIONNAIRES
6. BECOMING EASILY ANNOYED OR IRRITABLE: SEVERAL DAYS
5. BEING SO RESTLESS THAT IT IS HARD TO SIT STILL: SEVERAL DAYS
2. NOT BEING ABLE TO STOP OR CONTROL WORRYING: SEVERAL DAYS
7. FEELING AFRAID AS IF SOMETHING AWFUL MIGHT HAPPEN: SEVERAL DAYS
1. FEELING NERVOUS, ANXIOUS, OR ON EDGE: SEVERAL DAYS
3. WORRYING TOO MUCH ABOUT DIFFERENT THINGS: SEVERAL DAYS
GAD7 TOTAL SCORE: 7

## 2020-10-15 NOTE — PATIENT INSTRUCTIONS
Your Prescription has been sent  Please do not use Marijuana or any other Mood altering substance while you are on this medicine  Florence Ireland MD      Patient Education     Lisdexamfetamine Dimesylate Oral capsule  What is this medicine?  LISDEXAMFETAMINE (lis DEX am fet a meen) is used to treat attention-deficit hyperactivity disorder (ADHD) in adults and children. It is also used to treat binge-eating disorder in adults. Federal law prohibits giving this medicine to any person other than the person for whom it was prescribed. Do not share this medicine with anyone else.  This medicine may be used for other purposes; ask your health care provider or pharmacist if you have questions.  What should I tell my health care provider before I take this medicine?  They need to know if you have any of these conditions:    anxiety or panic attacks    circulation problems in fingers and toes    glaucoma    hardening or blockages of the arteries or heart blood vessels    heart disease or a heart defect    high blood pressure    history of a drug or alcohol abuse problem    history of stroke    kidney disease    liver disease    mental illness    seizures    suicidal thoughts, plans, or attempt; a previous suicide attempt by you or a family member    thyroid disease    Tourette's syndrome    an unusual or allergic reaction to lisdexamfetamine, other medicines, foods, dyes, or preservatives    pregnant or trying to get pregnant    breast-feeding  How should I use this medicine?  Take this medicine by mouth. Follow the directions on the prescription label. Swallow the capsules with a drink of water. You may open capsule and add to a glass of water, then drink right away. Take your doses at regular intervals. Do not take your medicine more often than directed. Do not suddenly stop your medicine. You must gradually reduce the dose or you may feel withdrawal effects. Ask your doctor or health care professional for advice.  A special  MedGuide will be given to you by the pharmacist with each prescription and refill. Be sure to read this information carefully each time.  Talk to your pediatrician regarding the use of this medicine in children. While this drug may be prescribed for children as young as 6 years of age for selected conditions, precautions do apply.  Overdosage: If you think you have taken too much of this medicine contact a poison control center or emergency room at once.  NOTE: This medicine is only for you. Do not share this medicine with others.  What if I miss a dose?  If you miss a dose, take it as soon as you can. If it is almost time for your next dose, take only that dose. Do not take double or extra doses.  What may interact with this medicine?  Do not take this medicine with any of the following medications:    MAOIs like Carbex, Eldepryl, Marplan, Nardil, and Parnate    other stimulant medicines for attention disorders, weight loss, or to stay awake  This medicine may also interact with the following medications:    acetazolamide    ammonium chloride    antacids    ascorbic acid    atomoxetine    caffeine    certain medicines for blood pressure    certain medicines for depression, anxiety, or psychotic disturbances    certain medicines for seizures like carbamazepine, phenobarbital, phenytoin    certain medicines for stomach problems like cimetidine, famotidine, omeprazole, lansoprazole    cold or allergy medicines    green tea    levodopa    linezolid    medicines for sleep during surgery    methenamine    norepinephrine    phenothiazines like chlorpromazine, mesoridazine, prochlorperazine, thioridazine    propoxyphene    sodium acid phosphate    sodium bicarbonate  This list may not describe all possible interactions. Give your health care provider a list of all the medicines, herbs, non-prescription drugs, or dietary supplements you use. Also tell them if you smoke, drink alcohol, or use illegal drugs. Some items may  interact with your medicine.  What should I watch for while using this medicine?  Visit your doctor for regular check ups. This prescription requires that you follow special procedures with your doctor and pharmacy. You will need to have a new written prescription from your doctor every time you need a refill.  This medicine may affect your concentration, or hide signs of tiredness. Until you know how this medicine affects you, do not drive, ride a bicycle, use machinery, or do anything that needs mental alertness.  Tell your doctor or health care professional if this medicine loses its effects, or if you feel you need to take more than the prescribed amount. Do not change your dose without talking to your doctor or health care professional.  Decreased appetite is a common side effect when starting this medicine. Eating small, frequent meals or snacks can help. Talk to your doctor if you continue to have poor eating habits. Height and weight growth of a child taking this medicine will be monitored closely.  Do not take this medicine close to bedtime. It may prevent you from sleeping.  If you are going to need surgery, a MRI, CT scan, or other procedure, tell your doctor that you are taking this medicine. You may need to stop taking this medicine before the procedure.  Tell your doctor or healthcare professional right away if you notice unexplained wounds on your fingers and toes while taking this medicine. You should also tell your healthcare provider if you experience numbness or pain, changes in the skin color, or sensitivity to temperature in your fingers or toes.  What side effects may I notice from receiving this medicine?  Side effects that you should report to your doctor or health care professional as soon as possible:    allergic reactions like skin rash, itching or hives, swelling of the face, lips, or tongue    changes in vision    chest pain or chest tightness    confusion, trouble speaking or  understanding    fast, irregular heartbeat    fingers or toes feel numb, cool, painful    hallucination, loss of contact with reality    high blood pressure    males: prolonged or painful erection    seizures    severe headaches    shortness of breath    suicidal thoughts or other mood changes    trouble walking, dizziness, loss of balance or coordination    uncontrollable head, mouth, neck, arm, or leg movements  Side effects that usually do not require medical attention (report to your doctor or health care professional if they continue or are bothersome):    anxious    headache    loss of appetite    nausea, vomiting    trouble sleeping    weight loss  This list may not describe all possible side effects. Call your doctor for medical advice about side effects. You may report side effects to FDA at 3-493-FDA-9369.  Where should I keep my medicine?  Keep out of the reach of children. This medicine can be abused. Keep your medicine in a safe place to protect it from theft. Do not share this medicine with anyone. Selling or giving away this medicine is dangerous and against the law.  Store at room temperature between 15 and 30 degrees C (59 and 86 degrees F). Protect from light. Keep container tightly closed. Throw away any unused medicine after the expiration date.  NOTE:This sheet is a summary. It may not cover all possible information. If you have questions about this medicine, talk to your doctor, pharmacist, or health care provider. Copyright  2016 Gold Standard

## 2020-10-16 ASSESSMENT — ANXIETY QUESTIONNAIRES: GAD7 TOTAL SCORE: 7

## 2021-01-11 ENCOUNTER — OFFICE VISIT (OUTPATIENT)
Dept: FAMILY MEDICINE | Facility: CLINIC | Age: 26
End: 2021-01-11
Payer: COMMERCIAL

## 2021-01-11 VITALS
TEMPERATURE: 97 F | OXYGEN SATURATION: 97 % | HEART RATE: 95 BPM | DIASTOLIC BLOOD PRESSURE: 77 MMHG | SYSTOLIC BLOOD PRESSURE: 124 MMHG

## 2021-01-11 DIAGNOSIS — Z11.3 SCREEN FOR STD (SEXUALLY TRANSMITTED DISEASE): ICD-10-CM

## 2021-01-11 DIAGNOSIS — R07.81 RIB PAIN ON RIGHT SIDE: ICD-10-CM

## 2021-01-11 DIAGNOSIS — Z23 NEED FOR TETANUS BOOSTER: ICD-10-CM

## 2021-01-11 DIAGNOSIS — R32 URINARY INCONTINENCE, UNSPECIFIED TYPE: Primary | ICD-10-CM

## 2021-01-11 LAB
ALBUMIN UR-MCNC: NEGATIVE MG/DL
AMORPH CRY #/AREA URNS HPF: ABNORMAL /HPF
APPEARANCE UR: ABNORMAL
BILIRUB UR QL STRIP: NEGATIVE
COLOR UR AUTO: YELLOW
GLUCOSE UR STRIP-MCNC: NEGATIVE MG/DL
HCV AB SERPL QL IA: NONREACTIVE
HGB UR QL STRIP: NEGATIVE
HIV 1+2 AB+HIV1 P24 AG SERPL QL IA: NONREACTIVE
KETONES UR STRIP-MCNC: NEGATIVE MG/DL
LEUKOCYTE ESTERASE UR QL STRIP: NEGATIVE
NITRATE UR QL: NEGATIVE
NON-SQ EPI CELLS #/AREA URNS LPF: ABNORMAL /LPF
PH UR STRIP: 8 PH (ref 5–7)
RBC #/AREA URNS AUTO: ABNORMAL /HPF
SOURCE: ABNORMAL
SP GR UR STRIP: 1.02 (ref 1–1.03)
T PALLIDUM AB SER QL: NONREACTIVE
UROBILINOGEN UR STRIP-ACNC: 0.2 EU/DL (ref 0.2–1)
WBC #/AREA URNS AUTO: ABNORMAL /HPF

## 2021-01-11 PROCEDURE — 90471 IMMUNIZATION ADMIN: CPT | Performed by: PHYSICIAN ASSISTANT

## 2021-01-11 PROCEDURE — 90715 TDAP VACCINE 7 YRS/> IM: CPT | Performed by: PHYSICIAN ASSISTANT

## 2021-01-11 PROCEDURE — 86780 TREPONEMA PALLIDUM: CPT | Mod: 90 | Performed by: PHYSICIAN ASSISTANT

## 2021-01-11 PROCEDURE — 87389 HIV-1 AG W/HIV-1&-2 AB AG IA: CPT | Performed by: PHYSICIAN ASSISTANT

## 2021-01-11 PROCEDURE — 81001 URINALYSIS AUTO W/SCOPE: CPT | Performed by: PHYSICIAN ASSISTANT

## 2021-01-11 PROCEDURE — 99213 OFFICE O/P EST LOW 20 MIN: CPT | Mod: 25 | Performed by: PHYSICIAN ASSISTANT

## 2021-01-11 PROCEDURE — 87491 CHLMYD TRACH DNA AMP PROBE: CPT | Performed by: PHYSICIAN ASSISTANT

## 2021-01-11 PROCEDURE — 36415 COLL VENOUS BLD VENIPUNCTURE: CPT | Performed by: PHYSICIAN ASSISTANT

## 2021-01-11 PROCEDURE — 99000 SPECIMEN HANDLING OFFICE-LAB: CPT | Performed by: PHYSICIAN ASSISTANT

## 2021-01-11 PROCEDURE — 86803 HEPATITIS C AB TEST: CPT | Performed by: PHYSICIAN ASSISTANT

## 2021-01-11 PROCEDURE — 87591 N.GONORRHOEAE DNA AMP PROB: CPT | Performed by: PHYSICIAN ASSISTANT

## 2021-01-11 ASSESSMENT — PAIN SCALES - GENERAL: PAINLEVEL: NO PAIN (0)

## 2021-01-11 NOTE — PROGRESS NOTES
Assessment & Plan     Urinary incontinence, unspecified type  Unclear. Patient has been seen for this in the past. Isn't improving. I'd like her to see urology for further evaluation.  - UROLOGY ADULT REFERRAL; Future    Leaking of urine  As above.  - UA with Microscopic reflex to Culture    Screen for STD (sexually transmitted disease)  Screening.  - Chlamydia trachomatis PCR  - Neisseria gonorrhoeae PCR  - HIV Antigen Antibody Combo  - Treponema Abs w Reflex to RPR and Titer  - Hepatitis C antibody    Rib pain on right side  Recommend NSAIDs for pain, avoiding painful activities. Discussed the plan of care will not likely change with an xray. Patient has no further questions.    Need for tetanus booster  Vaccinated today.  - TDAP VACCINE (Adacel, Boostrix)  [[id number]]      Return if symptoms worsen or fail to improve.    WOLF Mansfield [facility] BONITA Stark is a 25 year old who presents to clinic today for the following health issues     HPI       Genitourinary - Female  Onset/Duration: Ongoing for awhile, seen a provider for this before 2-3 months ago  Description:   Painful urination (Dysuria): no           Frequency: no  Blood in urine (Hematuria): no   Delay in urine (Hesitency): no  Intensity: mild  Progression of Symptoms:  same  Accompanying Signs & Symptoms:  Fever/chills: no  Flank pain: no  Nausea and vomiting: no  Vaginal symptoms: none  Abdominal/Pelvic Pain: Some twinges here and there  History:   History of frequent UTI s: no  History of kidney stones: no  Sexually Active: YES  Possibility of pregnancy: No  Precipitating or alleviating factors: Said this only happens when she goes to the bathroom in the morning.  Therapies tried and outcome:  No     Has never had similar issues in the past.   No dysuria. No hematuria. She reports after urinating, usually about 5-10 minutes later, she will have incontinence. It will generally be quite a bit of urine -  she'll just have a feeling of warmth in her groin and realize she urinated.   Not associated with sneezing, coughing. Has never had children.       Also reports some rib pain that has lingered after working out last week. Felt pain with lifting weights. Still bothersome.     Review of Systems   Constitutional, HEENT, cardiovascular, pulmonary, gi and gu systems are negative, except as otherwise noted.      Objective    /77 (BP Location: Right arm, Patient Position: Chair, Cuff Size: Adult Regular)   Pulse 95   Temp 97  F (36.1  C) (Oral)   LMP 12/20/2020   SpO2 97%   There is no height or weight on file to calculate BMI.  Physical Exam   GENERAL: healthy, alert and no distress  NECK: no adenopathy, no asymmetry, masses, or scars and thyroid normal to palpation  RESP: lungs clear to auscultation - no rales, rhonchi or wheezes  CV: regular rate and rhythm, normal S1 S2, no S3 or S4, no murmur, click or rub, no peripheral edema and peripheral pulses strong  ABDOMEN: soft, nontender, no hepatosplenomegaly, no masses and bowel sounds normal  MS: no gross musculoskeletal defects noted, no edema    Results for orders placed or performed in visit on 01/11/21 (from the past 24 hour(s))   UA with Microscopic reflex to Culture    Specimen: Midstream Urine   Result Value Ref Range    Color Urine Yellow     Appearance Urine Slightly Cloudy     Glucose Urine Negative NEG^Negative mg/dL    Bilirubin Urine Negative NEG^Negative    Ketones Urine Negative NEG^Negative mg/dL    Specific Gravity Urine 1.020 1.003 - 1.035    pH Urine 8.0 (H) 5.0 - 7.0 pH    Protein Albumin Urine Negative NEG^Negative mg/dL    Urobilinogen Urine 0.2 0.2 - 1.0 EU/dL    Nitrite Urine Negative NEG^Negative    Blood Urine Negative NEG^Negative    Leukocyte Esterase Urine Negative NEG^Negative    Source Midstream Urine     WBC Urine 0 - 5 OTO5^0 - 5 /HPF    RBC Urine O - 2 OTO2^O - 2 /HPF    Squamous Epithelial /LPF Urine Few FEW^Few /LPF     Amorphous Crystals Few (A) NEG^Negative /HPF

## 2021-01-11 NOTE — NURSING NOTE
Prior to immunization administration, verified patients identity using patient s name and date of birth. Please see Immunization Activity for additional information.     Screening Questionnaire for Adult Immunization    Are you sick today?   No   Do you have allergies to medications, food, a vaccine component or latex?   No   Have you ever had a serious reaction after receiving a vaccination?   No   Do you have a long-term health problem with heart, lung, kidney, or metabolic disease (e.g., diabetes), asthma, a blood disorder, no spleen, complement component deficiency, a cochlear implant, or a spinal fluid leak?  Are you on long-term aspirin therapy?   No   Do you have cancer, leukemia, HIV/AIDS, or any other immune system problem?   No   Do you have a parent, brother, or sister with an immune system problem?   No   In the past 3 months, have you taken medications that affect  your immune system, such as prednisone, other steroids, or anticancer drugs; drugs for the treatment of rheumatoid arthritis, Crohn s disease, or psoriasis; or have you had radiation treatments?   No   Have you had a seizure, or a brain or other nervous system problem?   No   During the past year, have you received a transfusion of blood or blood    products, or been given immune (gamma) globulin or antiviral drug?   No   For women: Are you pregnant or is there a chance you could become       pregnant during the next month?   No   Have you received any vaccinations in the past 4 weeks?   No     Immunization questionnaire answers were all negative.        Per orders of Ananya Eugene, injection of Tdap given by Meaghan Ramires. Patient instructed to remain in clinic for 15 minutes afterwards, and to report any adverse reaction to me immediately.    Vaccine information supplied.       Screening performed by Meaghan Ramires on 1/11/2021 at 10:14 AM.

## 2021-01-12 ENCOUNTER — TELEPHONE (OUTPATIENT)
Dept: FAMILY MEDICINE | Facility: CLINIC | Age: 26
End: 2021-01-12

## 2021-01-12 NOTE — TELEPHONE ENCOUNTER
----- Message from Ananya Eugene PA-C sent at 1/12/2021  2:00 PM CST -----  Patient requested call. Please call with negative STD results.

## 2021-04-17 ENCOUNTER — HEALTH MAINTENANCE LETTER (OUTPATIENT)
Age: 26
End: 2021-04-17

## 2021-06-22 NOTE — PROGRESS NOTES
Four months dep  hosp  Taken off meds  Since hosp unable to keep job  Anxiety re others  Worried thoughts.    Coping is to leave and therefore leaves job.    Left hosp on abilify.  Made me tired.  Slow.  Couldn't concentrate.  Then stopped as did not like.    Off all meds  appt dec 20   [facility] mental health clinic      needs forms    OBJECTIVE:   Vitals:    12/07/18 1432   BP: 102/64   Pulse: 80      Eyes: non icteric, noninflamed  Lungs: no resp distress  Heart: regular  Ankles: no edema  Muscles: nontender  Mental status: euthymic  Neuro: nonfocal    There is no height or weight on file to calculate BMI.   ASSESSMENT/PLAN:    Additional diagnoses and related orders:  1. History of ADHD       New pt  Recent hosp for suicidal ideation  Unclear dx  Hx stimulant  Started on but too sedated on abilify.  Has done well off meds two months    Will be seeing mental health for eval   Forms filled.  follow up after eval    Discussed meds, mental health etc  More than 15 of 25 minutes total time spent education counseling regarding the issues and care of same as listed in the assessment and plan of this note    
Stable

## 2021-06-23 NOTE — PROGRESS NOTES
Went to therapist who tested and questionnaired dx adhd    Pt got new job and worried she will get fired if she is not on her med of vyvanse    Made appt here saying was told she could get her adhd med for adult adhd    Admits not taking her abilify and never filling it as written by psychiatry while in pt.  She chose to not go to [facility] saying she does like them         OBJECTIVE:   Vitals:    01/17/19 0819   BP: 114/68   Pulse: 88      Eyes: non icteric, noninflamed  Lungs: no resp distress  Heart: regular  Ankles: no edema  Muscles: nontender  Mental status: euthymic  Neuro: nonfocal    There is no height or weight on file to calculate BMI.   Here with  who gets angry and curses making this provider feel like retiring to avoid threatening atmosphere  Pt is for most part calm but perturbed    Chart reviewed from prior vyvanse prescriber.  Several no shows.   Direction given to pt to find a provider to provide stimulants for adult adhd    hosp stay from suicidal ideation and 72 hour hold directed pt to [facility] for eval and management.  Was not dx adhd.  No stimulant provided    ASSESSMENT/PLAN:    1. History of ADHD  lisdexamfetamine (VYVANSE) 50 MG capsule    Ambulatory referral to Psychiatry     Pt told that one month of med prescribed with the understanding that she follow through with the [facility] eval and treatment.  Discussed the phenomenon of adult adhd and treatment with stimulants as unsupported by any literature known to this provider and that this provider will not continue this treatment.    More than 15 of 25 minutes total time spent education counseling regarding the issues and care of same as listed in the assessment and plan of this note    Visit ended in a friendly mood for all parties.   English

## 2021-07-01 ENCOUNTER — TELEPHONE (OUTPATIENT)
Dept: FAMILY MEDICINE | Facility: CLINIC | Age: 26
End: 2021-07-01

## 2021-07-01 NOTE — TELEPHONE ENCOUNTER
Reason for Call:  Other prescription    Detailed comments:  Patient needs to go back to take her ADHD medication    Phone Number Patient can be reached at: Cell number on file:    Telephone Information:   Mobile [phone]     Best Time: Anytime    Can we leave a detailed message on this number? YES    Call taken on 7/1/2021 at 7:09 AM by Sultana Ray

## 2021-07-07 ENCOUNTER — TELEPHONE (OUTPATIENT)
Dept: FAMILY MEDICINE | Facility: CLINIC | Age: 26
End: 2021-07-07

## 2021-07-07 ENCOUNTER — OFFICE VISIT (OUTPATIENT)
Dept: FAMILY MEDICINE | Facility: CLINIC | Age: 26
End: 2021-07-07
Payer: COMMERCIAL

## 2021-07-07 VITALS
RESPIRATION RATE: 18 BRPM | HEART RATE: 73 BPM | DIASTOLIC BLOOD PRESSURE: 73 MMHG | OXYGEN SATURATION: 98 % | SYSTOLIC BLOOD PRESSURE: 113 MMHG | BODY MASS INDEX: 28.46 KG/M2 | HEIGHT: 66 IN | TEMPERATURE: 98.1 F

## 2021-07-07 DIAGNOSIS — Z86.59 HISTORY OF BIPOLAR DISORDER: ICD-10-CM

## 2021-07-07 DIAGNOSIS — F98.8 ATTENTION DEFICIT DISORDER, UNSPECIFIED HYPERACTIVITY PRESENCE: Primary | ICD-10-CM

## 2021-07-07 DIAGNOSIS — F12.11 HISTORY OF CANNABIS ABUSE: ICD-10-CM

## 2021-07-07 DIAGNOSIS — F15.10 AMPHETAMINE ABUSE (H): ICD-10-CM

## 2021-07-07 DIAGNOSIS — F60.89 CLUSTER B PERSONALITY DISORDER (H): ICD-10-CM

## 2021-07-07 PROBLEM — F12.20 SEVERE CANNABIS USE DISORDER (H): Status: RESOLVED | Noted: 2018-09-12 | Resolved: 2021-07-07

## 2021-07-07 PROBLEM — F31.81 BIPOLAR 2 DISORDER, MAJOR DEPRESSIVE EPISODE (H): Status: RESOLVED | Noted: 2018-09-12 | Resolved: 2021-07-07

## 2021-07-07 PROCEDURE — 99214 OFFICE O/P EST MOD 30 MIN: CPT | Performed by: FAMILY MEDICINE

## 2021-07-07 RX ORDER — LISDEXAMFETAMINE DIMESYLATE 10 MG/1
10 CAPSULE ORAL DAILY
Qty: 30 CAPSULE | Refills: 0 | Status: SHIPPED | OUTPATIENT
Start: 2021-07-07 | End: 2021-07-07

## 2021-07-07 RX ORDER — LISDEXAMFETAMINE DIMESYLATE 10 MG/1
10 CAPSULE ORAL DAILY
Qty: 30 CAPSULE | Refills: 0 | Status: SHIPPED | OUTPATIENT
Start: 2021-08-07 | End: 2021-07-07

## 2021-07-07 RX ORDER — LISDEXAMFETAMINE DIMESYLATE 10 MG/1
CAPSULE ORAL
Qty: 30 CAPSULE | Refills: 0 | Status: SHIPPED | OUTPATIENT
Start: 2021-07-07 | End: 2021-11-24

## 2021-07-07 RX ORDER — LISDEXAMFETAMINE DIMESYLATE 10 MG/1
10 CAPSULE ORAL DAILY
Qty: 30 CAPSULE | Refills: 0 | Status: SHIPPED | OUTPATIENT
Start: 2021-09-07 | End: 2021-07-07

## 2021-07-07 RX ORDER — BENZONATATE 100 MG/1
3 CAPSULE ORAL 3 TIMES DAILY
COMMUNITY
Start: 2021-07-05 | End: 2021-11-24

## 2021-07-07 ASSESSMENT — PATIENT HEALTH QUESTIONNAIRE - PHQ9
5. POOR APPETITE OR OVEREATING: NEARLY EVERY DAY
SUM OF ALL RESPONSES TO PHQ QUESTIONS 1-9: 4

## 2021-07-07 ASSESSMENT — ANXIETY QUESTIONNAIRES
1. FEELING NERVOUS, ANXIOUS, OR ON EDGE: SEVERAL DAYS
6. BECOMING EASILY ANNOYED OR IRRITABLE: SEVERAL DAYS
5. BEING SO RESTLESS THAT IT IS HARD TO SIT STILL: NEARLY EVERY DAY
3. WORRYING TOO MUCH ABOUT DIFFERENT THINGS: NEARLY EVERY DAY
IF YOU CHECKED OFF ANY PROBLEMS ON THIS QUESTIONNAIRE, HOW DIFFICULT HAVE THESE PROBLEMS MADE IT FOR YOU TO DO YOUR WORK, TAKE CARE OF THINGS AT HOME, OR GET ALONG WITH OTHER PEOPLE: VERY DIFFICULT
7. FEELING AFRAID AS IF SOMETHING AWFUL MIGHT HAPPEN: SEVERAL DAYS
2. NOT BEING ABLE TO STOP OR CONTROL WORRYING: MORE THAN HALF THE DAYS
GAD7 TOTAL SCORE: 14

## 2021-07-07 ASSESSMENT — PAIN SCALES - GENERAL: PAINLEVEL: NO PAIN (0)

## 2021-07-07 NOTE — Clinical Note
Please call pt  I had to cancel Vyvance after 1 month  Further refill needs to be done By psychiatrist due to past History of ? Meth abuse  I am unable to do Refill-due to this  Triage -Please call Pharmacy and cancel Refills after this month

## 2021-07-07 NOTE — TELEPHONE ENCOUNTER
Called and spoke to patient.  Informed her of the provider's recommendations.  Patient states that she does not have a Psychiatrist at this time.  She was informed that a referral had been placed.    Patient states that she will call back for scheduling contact number.      [facility] as indicated ([phone])

## 2021-07-07 NOTE — PROGRESS NOTES
Assessment & Plan       ICD-10-CM    1. Attention deficit disorder, unspecified hyperactivity presence  F98.8 lisdexamfetamine (VYVANSE) 10 MG capsule     MENTAL HEALTH REFERRAL  - Adult; Psychiatry; Psychiatry; [facility]: [facility] as indicated ([phone]); Yes; Chronic Mental Health without improvement; Yes; We will contact you to schedule ...     DISCONTINUED: lisdexamfetamine (VYVANSE) 10 MG capsule     DISCONTINUED: lisdexamfetamine (VYVANSE) 10 MG capsule     DISCONTINUED: lisdexamfetamine (VYVANSE) 10 MG capsule   2. History of cannabis abuse  F12.11 MENTAL HEALTH REFERRAL  - Adult; Psychiatry; Psychiatry; [facility]: [facility] as indicated ([phone]); Yes; Chronic Mental Health without improvement; Yes; We will contact you to schedule ...   3. History of bipolar disorder  Z86.59 MENTAL HEALTH REFERRAL  - Adult; Psychiatry; Psychiatry; [facility]: [facility] as indicated ([phone]); Yes; Chronic Mental Health without improvement; Yes; We will contact you to schedule ...   4. Amphetamine abuse (H)  F15.10 MENTAL HEALTH REFERRAL  - Adult; Psychiatry; Psychiatry; [facility]: [facility] as indicated ([phone]); Yes; Chronic Mental Health without improvement; Yes; We will contact you to schedule ...   5. Cluster B personality disorder (H)  F60.89 MENTAL HEALTH REFERRAL  - Adult; Psychiatry; Psychiatry; [facility]: [facility] as indicated ([phone]); Yes; Chronic Mental Health without improvement; Yes; We will contact you to schedule ...       Refill of Vyvance x 1 done  Further refills cancelled    Further refills per Psychiatrist  Pt states she does not abuse drugs any More  Return in about 2 weeks (around 7/21/2021) for  "Physical Exam.    Florence Ireland MD  [facility] BONITA Stark is a 26 year old who presents for the following health issues    History of Present Illness       Mental Health Follow-up:  Patient presents to follow-up on Anxiety.    : sometimes it is good sometime its triny to focus.  The patient is having other symptoms associated with anxiety.  Any significant life events: other (new job)  Patient is not feeling anxious or having panic attacks.  Patient has no concerns about alcohol or drug use.     Social History  Tobacco Use    Smoking status: Current Every Day Smoker      Packs/day: 0.25      Types: Cigarettes      Quit date: 3/16/2011      Years since quitting: 10.3    Smokeless tobacco: Never Used  Alcohol use: No    Alcohol/week: 0.0 standard drinks  Drug use: No    Comment: marijuana      Today's PHQ-9         PHQ-9 Total Score:         PHQ-9 Q9 Thoughts of better off dead/self-harm past 2 weeks :       Thoughts of suicide or self harm:      Self-harm Plan:        Self-harm Action:          Safety concerns for self or others:           She eats 4 or more servings of fruits and vegetables daily.She consumes 0 sweetened beverage(s) daily.She exercises with enough effort to increase her heart rate 30 to 60 minutes per day.    She is taking medications regularly.     Pt started a new Job at [employer] and it is very busy and has a hard Time focusing  She has ADD and was taking Vyvanse and doing very well  Has been having a hard Time focusing  She does not abuse marijuana or any drugs  Pt also has some anxiety  No depression  See PHQ9 and RACHEAL  She used Vyvance last year and then stopped using this  Pt says she does not use any Drugs now    Review of Systems   Rest of the ROS is Negative except see above and Problem list [stable]        Objective    /73   Pulse 73   Temp 98.1  F (36.7  C) (Oral)   Resp 18   Ht 1.67 m (5' 5.75\")   LMP 06/23/2021 (Approximate)   SpO2 98%   " Breastfeeding No   BMI 28.46 kg/m    Body mass index is 28.46 kg/m .  Physical Exam   GENERAL: healthy, alert and no distress  PSYCH: mentation appears normal, affect normal/bright

## 2021-07-07 NOTE — TELEPHONE ENCOUNTER
Florence Ireland MD  P Fz Rn Triage Pool         Please call pt   I had to cancel Vyvance after 1 month   Further refill needs to be done By psychiatrist due to past History of ? Meth abuse   I am unable to do Refill-due to this   Triage -Please call Pharmacy and cancel Refills after this month

## 2021-07-08 ENCOUNTER — TELEPHONE (OUTPATIENT)
Dept: FAMILY MEDICINE | Facility: CLINIC | Age: 26
End: 2021-07-08

## 2021-07-08 ASSESSMENT — ANXIETY QUESTIONNAIRES: GAD7 TOTAL SCORE: 14

## 2021-07-08 NOTE — TELEPHONE ENCOUNTER
Patient calling to request medication for BV treatment. States she would like medication to treat. Symptoms are the same as when she was treated 5/20/21. She has lower abdominal discomfort and bloating along with low back pain. No discharge or noticeable odor. Symptoms began today. She recently became sexually active with a new partner. No fever, urinary symptoms. Appointment advised. Patient requesting Flagyl. Pharmacy pended     Routing to provider that last treated patient. Patient states she does not have a PCP.     Itzel Knott RN

## 2021-07-09 NOTE — TELEPHONE ENCOUNTER
Patient notified of provider's message as written. Scheduled for appointment.    GYPSY CastanedaN ZULEIKA  [facility]

## 2021-07-20 ENCOUNTER — OFFICE VISIT (OUTPATIENT)
Dept: FAMILY MEDICINE | Facility: CLINIC | Age: 26
End: 2021-07-20
Payer: COMMERCIAL

## 2021-07-20 VITALS
DIASTOLIC BLOOD PRESSURE: 67 MMHG | SYSTOLIC BLOOD PRESSURE: 105 MMHG | HEART RATE: 72 BPM | RESPIRATION RATE: 12 BRPM | WEIGHT: 176 LBS | OXYGEN SATURATION: 98 % | HEIGHT: 66 IN | TEMPERATURE: 98.4 F | BODY MASS INDEX: 28.28 KG/M2

## 2021-07-20 DIAGNOSIS — Z11.3 SCREEN FOR STD (SEXUALLY TRANSMITTED DISEASE): ICD-10-CM

## 2021-07-20 DIAGNOSIS — Z12.4 SCREENING FOR MALIGNANT NEOPLASM OF CERVIX: ICD-10-CM

## 2021-07-20 DIAGNOSIS — Z00.00 ROUTINE HISTORY AND PHYSICAL EXAMINATION OF ADULT: ICD-10-CM

## 2021-07-20 DIAGNOSIS — B96.89 BV (BACTERIAL VAGINOSIS): Primary | ICD-10-CM

## 2021-07-20 DIAGNOSIS — N76.0 BV (BACTERIAL VAGINOSIS): Primary | ICD-10-CM

## 2021-07-20 LAB
CLUE CELLS: PRESENT
TRICHOMONAS, WET PREP: ABNORMAL
WBC'S/HIGH POWER FIELD, WET PREP: ABNORMAL
YEAST, WET PREP: ABNORMAL

## 2021-07-20 PROCEDURE — 87210 SMEAR WET MOUNT SALINE/INK: CPT | Performed by: FAMILY MEDICINE

## 2021-07-20 PROCEDURE — 87340 HEPATITIS B SURFACE AG IA: CPT | Performed by: FAMILY MEDICINE

## 2021-07-20 PROCEDURE — 86780 TREPONEMA PALLIDUM: CPT | Performed by: FAMILY MEDICINE

## 2021-07-20 PROCEDURE — 87491 CHLMYD TRACH DNA AMP PROBE: CPT | Performed by: FAMILY MEDICINE

## 2021-07-20 PROCEDURE — 36415 COLL VENOUS BLD VENIPUNCTURE: CPT | Performed by: FAMILY MEDICINE

## 2021-07-20 PROCEDURE — 99395 PREV VISIT EST AGE 18-39: CPT | Performed by: FAMILY MEDICINE

## 2021-07-20 PROCEDURE — G0145 SCR C/V CYTO,THINLAYER,RESCR: HCPCS | Performed by: FAMILY MEDICINE

## 2021-07-20 PROCEDURE — 87389 HIV-1 AG W/HIV-1&-2 AB AG IA: CPT | Performed by: FAMILY MEDICINE

## 2021-07-20 PROCEDURE — 86803 HEPATITIS C AB TEST: CPT | Performed by: FAMILY MEDICINE

## 2021-07-20 PROCEDURE — 87591 N.GONORRHOEAE DNA AMP PROB: CPT | Performed by: FAMILY MEDICINE

## 2021-07-20 ASSESSMENT — ENCOUNTER SYMPTOMS
EYE PAIN: 0
DIZZINESS: 0
HEMATURIA: 0
WEAKNESS: 0
ARTHRALGIAS: 0
SORE THROAT: 0
HEMATOCHEZIA: 0
FREQUENCY: 1
FEVER: 0
DIARRHEA: 0
CONSTIPATION: 0
PARESTHESIAS: 0
DYSURIA: 0
CHILLS: 0
MYALGIAS: 0
COUGH: 0
JOINT SWELLING: 0
HEADACHES: 0
SHORTNESS OF BREATH: 0
NERVOUS/ANXIOUS: 0
PALPITATIONS: 0
NAUSEA: 0
BREAST MASS: 0
ABDOMINAL PAIN: 0
HEARTBURN: 0

## 2021-07-20 ASSESSMENT — PAIN SCALES - GENERAL: PAINLEVEL: NO PAIN (0)

## 2021-07-20 ASSESSMENT — MIFFLIN-ST. JEOR: SCORE: 1556.11
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
## 2021-07-21 LAB
HBV SURFACE AG SERPL QL IA: NONREACTIVE
HCV AB SERPL QL IA: NONREACTIVE
HIV 1+2 AB+HIV1 P24 AG SERPL QL IA: NONREACTIVE
T PALLIDUM AB SER QL: NONREACTIVE

## 2021-07-21 RX ORDER — METRONIDAZOLE 500 MG/1
500 TABLET ORAL 2 TIMES DAILY
Qty: 14 TABLET | Refills: 0 | Status: SHIPPED | OUTPATIENT
Start: 2021-07-21 | End: 2021-07-28

## 2021-07-21 NOTE — PATIENT INSTRUCTIONS
Patient Education     Bacterial Vaginosis    You have a vaginal infection called bacterial vaginosis (BV). Both good and bad bacteria are present in a healthy vagina. BV occurs when these bacteria get out of balance. The number of bad bacteria increase. And the number of good bacteria decrease. BV is linked with sexual activity, but it's not a sexually transmitted infection (STI).   BV may or may not cause symptoms. If symptoms do occur, they can include:     Thin, gray, milky-white, or sometimes green discharge    Unpleasant odor or  fishy  smell    Itching, burning, or pain in or around the vagina  It is not known what causes BV, but certain factors can make the problem more likely. These can include:     Douching    Spermicides    Use of antibiotics    Change in hormone levels with pregnancy, breastfeeding, or menopause    Having sex with a new partner    Having sex with more than one partner  BV will sometimes go away on its own. But treatment is often advised. This is because untreated BV can raise the risk of more serious health problems such as:     Pelvic inflammatory disease (PID)     delivery (giving birth to a baby early if you re pregnant)    HIV and some other sexually transmitted infections (STIs)    Infection after surgery on the reproductive organs  Home care  General care    BV is most often treated with medicines called antibiotics. These may be given as pills or as a vaginal cream. If antibiotics are prescribed, be sure to use them exactly as directed. And complete all of the medicine, even if your symptoms go away.    Don't douche or having sex during treatment.    If you have sex with a female partner, ask your healthcare provider if she should also be treated.  Prevention    Don't douche.    Don't have sex. If you do have sex, then take steps to lower your risk:  ? Use condoms when having sex.  ? Limit the number of sex partners you have.    Follow-up care  Follow up with your  healthcare provider, or as advised.   When to get medical advice  Call your healthcare provider right away if:     You have a fever of 100.4 F (38 C) or higher, or as directed by your provider.    Your symptoms get worse, or they don t go away within a few days of starting treatment.    You have new pain in the lower belly or pelvic region.    You have side effects that bother you or a reaction to the pills or cream you re prescribed.    You or any of your sex partners have new symptoms, such as a rash, joint pain, or sores.  [employer]) last reviewed this educational content on 6/1/2020 2000-2021 [employer]. All rights reserved. This information is not intended as a substitute for professional medical care. Always follow your healthcare professional's instructions.

## 2021-07-22 LAB
C TRACH DNA SPEC QL NAA+PROBE: NEGATIVE
N GONORRHOEA DNA SPEC QL NAA+PROBE: NEGATIVE

## 2021-07-23 LAB
BKR LAB AP GYN ADEQUACY: NORMAL
BKR LAB AP GYN INTERPRETATION: NORMAL
BKR LAB AP HPV REFLEX: NORMAL
BKR LAB AP PREVIOUS ABNORMAL: NORMAL
PATH REPORT.COMMENTS IMP SPEC: NORMAL
PATH REPORT.RELEVANT HX SPEC: NORMAL

## 2021-08-06 ENCOUNTER — NURSE TRIAGE (OUTPATIENT)
Dept: FAMILY MEDICINE | Facility: CLINIC | Age: 26
End: 2021-08-06

## 2021-08-06 NOTE — TELEPHONE ENCOUNTER
"Patient called with questions about her menstrual cycle.  Patient says her period is like clock work. Patient is experiencing some mid-cycle spotting/bleeding a week after a retained tampon that was discovered on 7/29/21 and she believes it was placed on 07/05/21. Patient did note odor to tampon but has not had any continued odor. Patient also reported that she recently resumed intercourse and uses condoms \"most\" of the time. RN discussed possible causes for mid-cycle spotting.     Note to be completed later    Tegan Mcbride RN on 8/6/2021 at 9:13 AM    "

## 2021-08-06 NOTE — TELEPHONE ENCOUNTER
Late entry  Tegan Mcbride RN on 8/6/2021 at 10:47 AM    Reason for Disposition    MILD bleeding or SPOTTING and could be pregnant (e.g., missed last period)    Additional Information    Negative: SEVERE vaginal bleeding (e.g., continuous red blood from vagina, or large blood clots) and very weak (can't stand)    Negative: Passed out (i.e., fainted, collapsed and was not responding)    Negative: Difficult to awaken or acting confused (e.g., disoriented, slurred speech)    Negative: Shock suspected (e.g., cold/pale/clammy skin, too weak to stand, low BP, rapid pulse)    Negative: Sounds like a life-threatening emergency to the triager    Negative: Pregnant > 20 weeks (5 months or more)    Negative: Pregnant < 20 weeks (less than 5 months)    Negative: Postpartum (from 0 to 6 weeks after delivery)    Negative: Vaginal discharge is the main symptom and bleeding is slight    Negative: SEVERE abdominal pain (e.g., excruciating)    Negative: SEVERE dizziness (e.g., unable to stand, requires support to walk, feels like passing out now)    Negative: SEVERE vaginal bleeding (e.g., soaking 2 pads or tampons per hour and present 2 or more hours; 1 menstrual cup every 2 hours)    Negative: MODERATE vaginal bleeding (e.g., soaking pad or tampon per hour and present > 6 hours; 1 menstrual cup every 6 hours)    Negative: Pale skin (pallor) of new onset or worsening    Negative: Constant abdominal pain lasting > 2 hours    Negative: Patient sounds very sick or weak to the triager    Negative: Taking Coumadin (warfarin) or other strong blood thinner, or known bleeding disorder (e.g., thrombocytopenia)    Negative: Skin bruises or nosebleed and not caused by an injury    Negative: Bleeding/spotting after procedure (e.g., biopsy) or pelvic examination (e.g., pap smear) that persists > 3 days    Negative: Patient wants to be seen    Negative: Passed tissue (e.g., gray-white)    Negative: Periods with > 6 soaked pads or tampons per  day    Negative: Periods last > 7 days    Negative: Uses menstrual cups and more than 80 ml blood per menstrual period    Negative: Missed period has occurred 2 or more times in the last year and the cause is not known    Negative: Menstrual cycle < 21 days OR > 35 days, and occurs more than two cycles (2 months) this past year    Negative: Bleeding or spotting between regular periods occurs more than three cycles (3 months) this past year    Negative: Bleeding or spotting between regular periods occurs more than three cycles (3 months), and using birth control medicine (e.g., pills, patch, Depo-Provera, Implanon, vaginal ring, Mirena IUD)    Negative: Bleeding or spotting occurs after hysterectomy    Negative: Age > 39 years with irregular or excessive bleeding    Negative: Bleeding or spotting occurs after sex (Exception: first intercourse)    Negative: Normal menstrual flow    Negative: MILD bleeding or SPOTTING after procedure (e.g., biopsy) or pelvic examination (e.g., pap smear) persisting < 4 days    Negative: MILD bleeding or SPOTTING and immediately follows first intercourse    Negative: Taking birth control pills and hasn't missed taking any pills    Negative: Taking birth control pills and has missed one or more pills; or took a progestin-only pill late    Negative: Using DepoProvera subdermal implant    Negative: Has Implanon subdermal implant    Negative: Using birth control patch (i.e., transdermal contraceptive system)    Negative: Using vaginal contraceptive ring (i.e., NuvaRing)    Negative: Using Mirena IUD (a special IUD that releases the hormone progestin)    Protocols used: VAGINAL BLEEDING - [id number]

## 2021-11-09 ENCOUNTER — PRENATAL OFFICE VISIT (OUTPATIENT)
Dept: OBGYN | Facility: CLINIC | Age: 26
End: 2021-11-09
Payer: COMMERCIAL

## 2021-11-09 ENCOUNTER — LAB (OUTPATIENT)
Dept: LAB | Facility: CLINIC | Age: 26
End: 2021-11-09

## 2021-11-09 DIAGNOSIS — Z34.01 ENCOUNTER FOR SUPERVISION OF NORMAL FIRST PREGNANCY IN FIRST TRIMESTER: Primary | ICD-10-CM

## 2021-11-09 DIAGNOSIS — Z34.01 ENCOUNTER FOR SUPERVISION OF NORMAL FIRST PREGNANCY IN FIRST TRIMESTER: ICD-10-CM

## 2021-11-09 DIAGNOSIS — O20.9 FIRST TRIMESTER BLEEDING: ICD-10-CM

## 2021-11-09 PROCEDURE — 99207 PR NO CHARGE NURSE ONLY: CPT

## 2021-11-09 PROCEDURE — 36415 COLL VENOUS BLD VENIPUNCTURE: CPT

## 2021-11-09 PROCEDURE — 84702 CHORIONIC GONADOTROPIN TEST: CPT

## 2021-11-09 RX ORDER — FAMOTIDINE 20 MG
TABLET ORAL
COMMUNITY
End: 2021-11-24

## 2021-11-09 RX ORDER — PRENATAL VIT/IRON FUM/FOLIC AC 27MG-0.8MG
1 TABLET ORAL DAILY
Qty: 90 TABLET | Refills: 3 | Status: SHIPPED | OUTPATIENT
Start: 2021-11-09 | End: 2022-12-01

## 2021-11-09 RX ORDER — FLUCONAZOLE 150 MG/1
TABLET ORAL
COMMUNITY
Start: 2021-04-05 | End: 2021-11-09

## 2021-11-09 NOTE — PROGRESS NOTES
IUP with yolk sac noted on ED ultrasound. Advise ultrasound follow-up here on 11/18 or later- may schedule. Advise pelvic rest. If recurrent bleeding then check follow-up quant HCG too. Please notify.   Irineo Wagner MD

## 2021-11-09 NOTE — Clinical Note
Mark Wagner, pt currently 6w1d, seen in ED yesterday for bleeding and US showed likely subchorionic hemorrhage. Pt advised to have repeat HCG today, lab appt scheduled. RN queued HCG quant orders and prescription for prenatals per pt request. Please advise on labs and if f/u US is advised. Thank you, ZULEIKA France

## 2021-11-09 NOTE — PROGRESS NOTES
Telephone visit with patient for New Prenatal Intake and Education. This is patient's first pregnancy. Handouts reviewed and will be provided at next prenatal appointment. Scheduled for New Prenatal with Dr. Wagner on 11/24/2021.      Pt seen in ED yesterday for vaginal bleeding, US showed subchorionic hemorrhage. Pt states bleeding has since stopped, denies cramping at this time.    Pt currently smoking marijuana, advised she should discontinue this during pregnancy.      Prenatal OB Questionnaire  Patient supplied answers from flow sheet for:  Prenatal OB Questionnaire.  Past Medical History  Have you ever recieved care for your mental health? : (!) Yes (6 years ago)  Have you ever been in a major accident or suffered serious trauma?: No  Within the last year, has anyone hit, slapped, kicked or otherwise hurt you?: No  In the last year, has anyone forced you to have sex when you didn't want to?: No    Past Medical History 2   Have you ever received a blood transfusion?: No  Would you accept a blood transfusion if was medically recommended?: Yes  Does anyone in your home smoke?: (!) Yes   Is your blood type Rh negative?: No (B poitive)  Have you ever ?: No  Have you been hospitalized for a nonsurgical reason excluding normal delivery?: No  Have you ever had an abnormal pap smear?: No    Past Medical History (Continued)  Do you have a history of abnormalities of the uterus?: No  Did your mother take CARLENE or any other hormones when she was pregnant with you?: Unknown  Do you have any other problems we have not asked about which you feel may be important to this pregnancy?: No        Allergies as of 11/9/2021:    Allergies as of 11/09/2021 - Reviewed 07/20/2021   Allergen Reaction Noted     Latex  05/20/2020     No known allergies  01/18/2018       Current medications are:  Current Outpatient Medications   Medication Sig Dispense Refill     benzonatate (TESSALON) 100 MG capsule Take 3 capsules by mouth 3 times  daily       docosanol (ABREVA) 10 % CREA cream Apply 1 Application topically 5 times daily       fluconazole (DIFLUCAN) 150 MG tablet TAKE 1 TABLET BY MOUTH ONCE FOR ONE DOSE. MAY REPEAT DOSE 2 AT 7 DAYS       fluticasone (FLONASE) 50 MCG/ACT spray Spray 1-2 sprays into both nostrils daily (Patient not taking: Reported on 10/14/2019) 1 Bottle 1     lisdexamfetamine (VYVANSE) 10 MG capsule Please only give 1 month prescription  Cancel refills after 1 month 30 capsule 0     Prenatal Vit-Fe Fumarate-FA (PRENATAL COMPLETE) 14-0.4 MG TABS        tiZANidine (ZANAFLEX) 4 MG tablet TK ONE T PO QHS (Patient not taking: Reported on 7/20/2021)  0         Early ultrasound screening tool:    Does patient have irregular periods?  No  Did patient use hormonal birth control in the three months prior to positive urine pregnancy test? No  Is the patient breastfeeding?  No  Is the patient 10 weeks or greater at time of education visit?  No    Edilma Murillo RN on 11/9/2021 at 9:34 AM

## 2021-11-09 NOTE — PROGRESS NOTES
RN called pt and relayed providers advisement below.    Patient verbalized understanding and agreed to plan.     Patient was given the opportunity to ask additional questions and have them answered. Patient had no further questions.     Edilma Murillo RN on 11/9/2021 at 11:35 AM

## 2021-11-10 ENCOUNTER — MYC MEDICAL ADVICE (OUTPATIENT)
Dept: OBGYN | Facility: CLINIC | Age: 26
End: 2021-11-10
Payer: COMMERCIAL

## 2021-11-10 ENCOUNTER — DOCUMENTATION ONLY (OUTPATIENT)
Dept: LAB | Facility: CLINIC | Age: 26
End: 2021-11-10
Payer: COMMERCIAL

## 2021-11-10 ENCOUNTER — NURSE TRIAGE (OUTPATIENT)
Dept: NURSING | Facility: CLINIC | Age: 26
End: 2021-11-10
Payer: COMMERCIAL

## 2021-11-10 DIAGNOSIS — O20.0 THREATENED ABORTION: Primary | ICD-10-CM

## 2021-11-10 LAB — B-HCG SERPL-ACNC: ABNORMAL IU/L (ref 0–5)

## 2021-11-10 NOTE — TELEPHONE ENCOUNTER
6w 2d. Prenatal intake done on 21. First OB appt scheduled 21.      Vaginal spotting in pregnancy.    HCG done 21 - 30,180  results yet to be read by provider.    Ultrasound scheduled 21.    Pt with same amount of spotting (brown/red) as , which has been noticed of small amount on under ware, and with wiping. Pt states now color and amount getting lighter, just noticing w/wiping. Pt very nervous and wants to know HCG results.     RN reassured pt that provider will read/interpret result and notify pt. RN gave ED precautions, encouraged continued pelvic rest until further notice by provider and to continue to monitor and call clinic w/further concerns.    Routing to provider for update.    Helena Wilson, GYPSYN RN

## 2021-11-10 NOTE — PROGRESS NOTES
Pt is scheduled at the [facility] lab tomorrow.  Put in appt notes for the lab appointment to draw HCG quant only and not the future labs.    Janel Dolan RN

## 2021-11-10 NOTE — PROGRESS NOTES
This patient has a future lab only appointment tomorrow 10/11/2021 and needs orders. The orders in place are expected 12/09/2021 and are too far out for us to release. It is lab policy that we release labs only 2 two weeks prior to the expected date. Please change the date on these labs or please send new orders. Thanks guille

## 2021-11-10 NOTE — TELEPHONE ENCOUNTER
Pt had an HCG quant drawn yesterday, 11/9.  Pt is wondering if the number is okay as she states her HCG quant was 23,477 [place] [facility] [place].    Dr. Wagner has not reviewed and interpreted pt's HCG quant level from yesterday so I will route pt's question regarding her result to Dr. Wagner and provider group to further advise on as Dr. Wagner is out of office until 11/15.    Janel Dolan RN

## 2021-11-10 NOTE — TELEPHONE ENCOUNTER
Called and discussed results.   Betas are ordered.   Recommend to continue with planned ultrasound on 11/19 based on ultrasound results from  on 11/7  Concha De La Vega, BABITA, JOSIAH

## 2021-11-10 NOTE — PROGRESS NOTES
"Patient's lab appointment tomorrow should only draw the Quant HCG, not the other labs that are \"future\" order with an expected date of 12/9/21. There is a standing order for the HCG. Please notify lab. Marily JC CNP    "

## 2021-11-11 ENCOUNTER — LAB (OUTPATIENT)
Dept: LAB | Facility: CLINIC | Age: 26
End: 2021-11-11
Payer: COMMERCIAL

## 2021-11-11 DIAGNOSIS — Z34.01 ENCOUNTER FOR SUPERVISION OF NORMAL FIRST PREGNANCY IN FIRST TRIMESTER: ICD-10-CM

## 2021-11-11 PROCEDURE — 36415 COLL VENOUS BLD VENIPUNCTURE: CPT

## 2021-11-11 PROCEDURE — 84702 CHORIONIC GONADOTROPIN TEST: CPT

## 2021-11-12 LAB — B-HCG SERPL-ACNC: ABNORMAL IU/L (ref 0–5)

## 2021-11-19 ENCOUNTER — ANCILLARY PROCEDURE (OUTPATIENT)
Dept: ULTRASOUND IMAGING | Facility: CLINIC | Age: 26
End: 2021-11-19
Attending: OBSTETRICS & GYNECOLOGY
Payer: COMMERCIAL

## 2021-11-19 DIAGNOSIS — O20.9 FIRST TRIMESTER BLEEDING: ICD-10-CM

## 2021-11-19 PROBLEM — O20.8 SUBCHORIONIC HEMORRHAGE IN FIRST TRIMESTER: Status: ACTIVE | Noted: 2021-11-19

## 2021-11-19 PROCEDURE — 76801 OB US < 14 WKS SINGLE FETUS: CPT | Performed by: RADIOLOGY

## 2021-11-24 ENCOUNTER — PRENATAL OFFICE VISIT (OUTPATIENT)
Dept: OBGYN | Facility: CLINIC | Age: 26
End: 2021-11-24
Payer: COMMERCIAL

## 2021-11-24 VITALS
SYSTOLIC BLOOD PRESSURE: 128 MMHG | WEIGHT: 180 LBS | DIASTOLIC BLOOD PRESSURE: 84 MMHG | OXYGEN SATURATION: 98 % | HEART RATE: 74 BPM | BODY MASS INDEX: 29.27 KG/M2

## 2021-11-24 DIAGNOSIS — Z34.00 SUPERVISION OF NORMAL FIRST PREGNANCY, ANTEPARTUM: Primary | ICD-10-CM

## 2021-11-24 PROBLEM — F41.9 ANXIETY: Status: RESOLVED | Noted: 2017-12-18 | Resolved: 2021-11-24

## 2021-11-24 PROBLEM — N97.0 ANOVULATION: Status: RESOLVED | Noted: 2017-09-06 | Resolved: 2021-11-24

## 2021-11-24 PROBLEM — E78.5 HYPERLIPIDEMIA WITH TARGET LDL LESS THAN 160: Status: RESOLVED | Noted: 2017-12-18 | Resolved: 2021-11-24

## 2021-11-24 PROBLEM — F43.12 CHRONIC POST-TRAUMATIC STRESS DISORDER (PTSD): Status: RESOLVED | Noted: 2018-09-13 | Resolved: 2021-11-24

## 2021-11-24 PROBLEM — F15.10 AMPHETAMINE ABUSE (H): Status: RESOLVED | Noted: 2021-07-07 | Resolved: 2021-11-24

## 2021-11-24 LAB
ABO/RH(D): NORMAL
ANTIBODY SCREEN: NEGATIVE
ERYTHROCYTE [DISTWIDTH] IN BLOOD BY AUTOMATED COUNT: 11.8 % (ref 10–15)
HCT VFR BLD AUTO: 40.1 % (ref 35–47)
HGB BLD-MCNC: 13.5 G/DL (ref 11.7–15.7)
MCH RBC QN AUTO: 31.2 PG (ref 26.5–33)
MCHC RBC AUTO-ENTMCNC: 33.7 G/DL (ref 31.5–36.5)
MCV RBC AUTO: 93 FL (ref 78–100)
PLATELET # BLD AUTO: 211 10E3/UL (ref 150–450)
RBC # BLD AUTO: 4.33 10E6/UL (ref 3.8–5.2)
SPECIMEN EXPIRATION DATE: NORMAL
WBC # BLD AUTO: 12.3 10E3/UL (ref 4–11)

## 2021-11-24 PROCEDURE — 86780 TREPONEMA PALLIDUM: CPT | Performed by: OBSTETRICS & GYNECOLOGY

## 2021-11-24 PROCEDURE — 86900 BLOOD TYPING SEROLOGIC ABO: CPT | Performed by: OBSTETRICS & GYNECOLOGY

## 2021-11-24 PROCEDURE — 85027 COMPLETE CBC AUTOMATED: CPT | Performed by: OBSTETRICS & GYNECOLOGY

## 2021-11-24 PROCEDURE — 87086 URINE CULTURE/COLONY COUNT: CPT | Performed by: OBSTETRICS & GYNECOLOGY

## 2021-11-24 PROCEDURE — 86803 HEPATITIS C AB TEST: CPT | Performed by: OBSTETRICS & GYNECOLOGY

## 2021-11-24 PROCEDURE — 99207 PR FIRST OB VISIT: CPT | Performed by: OBSTETRICS & GYNECOLOGY

## 2021-11-24 PROCEDURE — 87389 HIV-1 AG W/HIV-1&-2 AB AG IA: CPT | Performed by: OBSTETRICS & GYNECOLOGY

## 2021-11-24 PROCEDURE — 86762 RUBELLA ANTIBODY: CPT | Performed by: OBSTETRICS & GYNECOLOGY

## 2021-11-24 PROCEDURE — 87340 HEPATITIS B SURFACE AG IA: CPT | Performed by: OBSTETRICS & GYNECOLOGY

## 2021-11-24 PROCEDURE — 86850 RBC ANTIBODY SCREEN: CPT | Performed by: OBSTETRICS & GYNECOLOGY

## 2021-11-24 PROCEDURE — 36415 COLL VENOUS BLD VENIPUNCTURE: CPT | Performed by: OBSTETRICS & GYNECOLOGY

## 2021-11-24 PROCEDURE — 86901 BLOOD TYPING SEROLOGIC RH(D): CPT | Performed by: OBSTETRICS & GYNECOLOGY

## 2021-11-24 NOTE — PATIENT INSTRUCTIONS
If you have any questions regarding your visit, Please contact your care team.     [employer]: [phone]  Women s Health CLINIC HOURS TELEPHONE NUMBER       Irineo Wagner M.D.    Edilma-ZULEIKA Islas-ZULEIKA Paige-Medical Assistant    Ani-  Ángela-     Monday-[place]  8:00a.m-4:45 p.m  Tuesday-[place]  9:00a.m-4:00 p.m  Wednesday-[place] 8:00a.m-4:45 p.m.  Thursday-[place]  8:00a.m-4:45 p.m.  Friday-[place]  8:00a.m-4:45 p.m. [facility]  [address] [place] [id number] [phone] ask for [facility]  [phone] Fax  Imaging [phone]    [facility] Labor and Delivery  [address]  [place], [place] [address] [phone]    [facility]  [address] [place]  [place] [address] [phone] ask [facility]  [phone] Fax  Imaging [phone]     Urgent Care locations:    [place]        [place] Monday-Friday  10 am - 8 pm  Saturday and Sunday   9 am - 5 pm  Monday-Friday   10 am - 8 pm  Saturday and Sunday   9 am - 5 pm   [phone]   If you need a medication refill, please contact your pharmacy. Please allow 3 business days for your refill to be completed.  As always, Thank you for trusting us with your healthcare needs!

## 2021-11-25 LAB — BACTERIA UR CULT: NORMAL

## 2021-11-25 NOTE — PROGRESS NOTES
Mi Salgado is a 26 year old year old G 1 P 0 who presents for an initial obstetrical visit.  Referred by sister/patient.    Currently experiencing normal pregnancy symptoms without particular problems including pain, bleeding, marked vomiting or weight loss except: early bleeding and none for 10 days. Mild nausea and vomiting.   This was a semi-planned pregnancy.  Here today with partner.  Additional concerns: subchorionic hemorrhage reviewed, vaping and encouraged cessation, other- see PMH.     ROS:     Systems reviewed include constitutional; breast;                 cardiac; respiratory; gastrointestinal; genitourinary;                                musculoskeletal; integumentary; psychological;                                hematologic/lymphatic and endocrine.                  These systems were negative for significant symptoms except                 for the following: none and see above HPI.    Past medical, obstetrical, surgical, family and social history reviewed and as noted or updated in chart.     Allergies, meds and supplements are as noted or updated in chart.                    Episode OB dating, demographic and history forms completed or reviewed.    EXAM:  VS as noted. BMI- Body mass index is 29.27 kg/m .    Relatively recent normal general exam- not repeated now.         Mi was seen today for prenatal care.    Diagnoses and all orders for this visit:    Supervision of normal first pregnancy, antepartum  -     ABO/Rh type and screen; Future  -     CBC with platelets; Future  -     Hepatitis B surface antigen; Future  -     Rubella Antibody IgG; Future  -     Urine Culture; Future  -     HIV Antigen Antibody Combo; Future  -     Treponema Abs w Reflex to RPR and Titer; Future  -     Hepatitis C antibody; Future  -     ABO/Rh type and screen  -     CBC with platelets  -     Hepatitis B surface antigen  -     Rubella Antibody IgG  -     Urine Culture  -     HIV Antigen Antibody Combo  -      Treponema Abs w Reflex to RPR and Titer  -     Hepatitis C antibody        PLAN: Counseling included the following: Advice appropriate to gestational age reviewed including pertinent Checklist items. Discussed condition, tests and general care plan. Symptoms, problems and concerns reviewed. Recommended weight gain discussed. Problem list initiated.   30 minutes spent on the date of encounter doing chart review, history, examination, discussion with patient, and documentation, and further activities as noted, and review of appropriateness of decision-making for care.  Pap due in 3 yrs. Orders as noted. RTC in 3 weeks. May be interested in first trimester screening. Discuss special screening tests next.    Irineo Wagner MD

## 2021-11-26 ENCOUNTER — MYC MEDICAL ADVICE (OUTPATIENT)
Dept: OBGYN | Facility: CLINIC | Age: 26
End: 2021-11-26
Payer: COMMERCIAL

## 2021-11-26 LAB
HBV SURFACE AG SERPL QL IA: NONREACTIVE
HCV AB SERPL QL IA: NONREACTIVE
HIV 1+2 AB+HIV1 P24 AG SERPL QL IA: NONREACTIVE
RUBV IGG SERPL QL IA: 1.25 INDEX
RUBV IGG SERPL QL IA: POSITIVE
T PALLIDUM AB SER QL: NONREACTIVE

## 2021-11-29 NOTE — TELEPHONE ENCOUNTER
Pt last seen 11/24/2021, currently 9w0d. Pt desires NIPT testing and to know gender. Pt also asking about f/u for subchorionic hemorrhage.    RN routing to provider for advisement on orders and if both f/u US and NIPT testing is to be done with MFM.    Edilma Murillo RN on 11/29/2021 at 8:26 AM

## 2021-11-29 NOTE — TELEPHONE ENCOUNTER
Yes, we had discussed briefly the first trimester screening testing with MFM and normally would review and schedule that following the next OB visit at 11 weeks. I do not think that patient has scheduled that OB follow-up yet.     OB labs are all in now. Patient may proceed with first trimester screening if desired. This is done through the MFM office not through our regular appointment line. The subchorionic hemorrhage may be rechecked then too.  Please arrange first trimester screen (this includes ultrasound, labs, and genetic counseling) with MFM at  site if possible. This is elective. I will sign the paper referral order later as needed.     Irineo Wagner MD

## 2021-12-17 ENCOUNTER — TRANSFERRED RECORDS (OUTPATIENT)
Dept: HEALTH INFORMATION MANAGEMENT | Facility: CLINIC | Age: 26
End: 2021-12-17
Payer: COMMERCIAL

## 2022-01-03 PROBLEM — Z34.00 SUPERVISION OF NORMAL FIRST PREGNANCY, ANTEPARTUM: Status: ACTIVE | Noted: 2021-11-24

## 2022-01-26 ENCOUNTER — PRENATAL OFFICE VISIT (OUTPATIENT)
Dept: OBGYN | Facility: CLINIC | Age: 27
End: 2022-01-26
Payer: COMMERCIAL

## 2022-01-26 VITALS
BODY MASS INDEX: 34.64 KG/M2 | OXYGEN SATURATION: 97 % | SYSTOLIC BLOOD PRESSURE: 115 MMHG | HEART RATE: 81 BPM | DIASTOLIC BLOOD PRESSURE: 69 MMHG | WEIGHT: 213 LBS

## 2022-01-26 DIAGNOSIS — Z34.00 SUPERVISION OF NORMAL FIRST PREGNANCY, ANTEPARTUM: ICD-10-CM

## 2022-01-26 PROBLEM — O20.8 SUBCHORIONIC HEMORRHAGE IN FIRST TRIMESTER: Status: RESOLVED | Noted: 2021-11-19 | Resolved: 2022-01-26

## 2022-01-26 PROCEDURE — 99207 PR PRENATAL VISIT: CPT | Performed by: OBSTETRICS & GYNECOLOGY

## 2022-01-26 NOTE — PATIENT INSTRUCTIONS
If you have any questions regarding your visit, Please contact your care team.     [employer]: [phone]  Women s Health CLINIC HOURS TELEPHONE NUMBER       Irineo Wagner M.D.    Edilma-ZULEIKA Islas-ZULEIKA Paige-Medical Assistant    Ani-  Ángela-     Monday-[place]  8:00a.m-4:45 p.m  Tuesday-[place]  9:00a.m-4:00 p.m  Wednesday-[place] 8:00a.m-4:45 p.m.  Thursday-[place]  8:00a.m-4:45 p.m.  Friday-[place]  8:00a.m-4:45 p.m. [facility]  [address] [place] [address] [phone] ask for [facility]  [phone] Fax  Imaging [phone]    [facility] Labor and Delivery  [address] [place] [address] [place] [address]  [phone]    [facility]  [address] [place]  [place] [address] [phone] ask [facility]  [phone] Fax  Imaging [phone]     Urgent Care locations:    [facility] Monday-Friday  10 am - 8 pm  Saturday and Sunday   9 am - 5 pm  Monday-Friday   10 am - 8 pm  Saturday and Sunday   9 am - 5 pm   [phone]   If you need a medication refill, please contact your pharmacy. Please allow 3 business days for your refill to be completed.  As always, Thank you for trusting us with your healthcare needs!

## 2022-01-26 NOTE — LETTER
2022    RE: Mi Salgado,  1995    To Whom It May Concern:    Mi Salgado is under our care and was seen at our office on 2022.    Due to her medical condition and her pregnancy she may continue to work but should be assigned to a safe work environment. Her due date is 2022.     I hope this information is sufficient for your needs.  If you have any additional questions regarding this matter please contact our office.  Thank you.      Sincerely,        Irineo Wagner MD

## 2022-01-27 NOTE — PROGRESS NOTES
No significant signs, symptoms or concerns except some bilateral hand numbness, has nearly quit all vaping and marijuana. Work note given and [facility] maternity leave forms to be sent. Here with partner.   Discussed AFP and desired. Has MFM follow-up for Level 2 ultrasound.    Counseling included the following: Advice appropriate to gestational age reviewed including pertinent Checklist items. Discussed condition, tests and care plan including risks, benefits, and alternatives. Problem list updated.   20 minutes spent on the date of encounter doing chart review, history, examination, discussion with patient, and documentation, and further activities as noted, and review of appropriateness of decision-making for care, and review of test results, and interpretation of test results, and review of outside records.  A/P:  Mi was seen today for prenatal care.    Diagnoses and all orders for this visit:    Supervision of normal first pregnancy, antepartum  -     Alpha fetoprotein maternal screen; Future        Irineo Wagner MD

## 2022-01-28 ENCOUNTER — TELEPHONE (OUTPATIENT)
Dept: OBGYN | Facility: CLINIC | Age: 27
End: 2022-01-28
Payer: COMMERCIAL

## 2022-01-29 NOTE — TELEPHONE ENCOUNTER
Patient brought forms to appointment and these where completed and faxed to  at [phone] as requested    [facility] and sent to abstracting for scanning.    Royal Kerr CMA

## 2022-02-04 ENCOUNTER — NURSE TRIAGE (OUTPATIENT)
Dept: NURSING | Facility: CLINIC | Age: 27
End: 2022-02-04
Payer: COMMERCIAL

## 2022-02-04 ENCOUNTER — PRENATAL OFFICE VISIT (OUTPATIENT)
Dept: OBGYN | Facility: CLINIC | Age: 27
End: 2022-02-04
Payer: COMMERCIAL

## 2022-02-04 VITALS
HEART RATE: 81 BPM | WEIGHT: 214 LBS | BODY MASS INDEX: 34.8 KG/M2 | SYSTOLIC BLOOD PRESSURE: 123 MMHG | OXYGEN SATURATION: 99 % | TEMPERATURE: 98 F | DIASTOLIC BLOOD PRESSURE: 77 MMHG

## 2022-02-04 DIAGNOSIS — O44.02 PLACENTA PREVIA IN SECOND TRIMESTER: ICD-10-CM

## 2022-02-04 DIAGNOSIS — N94.9 ROUND LIGAMENT PAIN: Primary | ICD-10-CM

## 2022-02-04 PROCEDURE — 99207 PR PRENATAL VISIT: CPT | Performed by: OBSTETRICS & GYNECOLOGY

## 2022-02-04 NOTE — TELEPHONE ENCOUNTER
"Triage Call:     Pt had intercourse on wednesday and noticed cramping and a dull ache feeling on her right side that is constant  Location: Lower abdomen, Below stomach on the side above groin  Pain: 2-3/10  When she moves she can feel it more, stretching, getting out of the car makes it worse  When she touches the area there is no difference in the pain    19 weeks pregnant  Pt asking: \"Round ligament pain\"?    Placenta is on cervix    Disposition: Home Care. Care acvice given and patient was also transferred to scheduling for an appt.     Violeta Palma RN  [facility] Nurse Advisor 7:39 AM 2022      OB Triage Call      Is patient's OB/Midwife with the [facility] or [facility]? [facility]- Proceed with triage     Reason for call: see above     Assessment: see above       Plan: Home Care, but patient wanted to be seen by a provider if appt available.     Patient plans to deliver at [place]    Patient's primary OB Provider is JONNATHAN.      Per protocol recommendations Patient to follow home care recommendations. An FYI page or consult is not needed unless patient is requesting to speak to midwife or MD, they are in labor, or it is recommended by triage protocol    Is patient's delivering hospital on divert? No      18w4d    Estimated Date of Delivery: 2022        OB History    Para Term  AB Living   1 0 0 0 0 0   SAB IAB Ectopic Multiple Live Births   0 0 0 0 0      # Outcome Date GA Lbr Layo/2nd Weight Sex Delivery Anes PTL Lv   1 Current                No results found for: GBS           Reason for Disposition    Round ligament pain (previously diagnosed by physician), questions about    MILD abdominal pain (e.g., doesn't interfere with normal activities)    Additional Information    Negative: Passed out (i.e., fainted, collapsed and was not responding)    Negative: Shock suspected (e.g., cold/pale/clammy skin, too weak to stand, low BP, rapid pulse)    Negative: Difficult to awaken " or acting confused (e.g., disoriented, slurred speech)    Negative: Sounds like a life-threatening emergency to the triager    Negative: Abdominal pain and pregnant > 20 weeks    Negative: MODERATE-SEVERE abdominal pain    Negative: SEVERE vaginal bleeding (e.g., soaking 2 pads per hour, large blood clots) and present 2 or more hours    Negative: MODERATE vaginal bleeding (e.g., soaking 1 pad per hour; clots) and present > 6 hours    Negative: MODERATE vaginal bleeding and pregnant > 12 weeks    Negative: Passed tissue (e.g., gray-white)    Negative: Vomiting and contains red blood or black ('coffee ground') material    Negative: Shoulder pain    Negative: MILD constant abdominal pain (e.g., doesn't interfere with normal activities) and present > 2 hours    Negative: Intermittent lower abdominal pain lasting > 24 hours    Negative: Vaginal bleeding or spotting    Negative: White of the eyes have turned yellow (i.e., jaundice)    Negative: Lightheadedness or dizziness    Negative: Patient sounds very sick or weak to the triager    Negative: Fever > 100.4 F (38.0 C)    Negative: Unusual vaginal discharge (e.g., odorous, yellow, green, or foamy-white)    Negative: Discomfort when passing urine (e.g., pain, burning or stinging)    Negative: Blood in urine (red, pink, or tea-colored)    Negative: Patient wants to be seen    Negative: Prior history of 'ectopic pregnancy' or previous tubal surgery (e.g., tubal ligation)    Negative: Has IUD    Negative: Not feeling pregnant any longer (e.g., breast tenderness or nausea has disappeared)    Negative: Upper abdominal pains and radiate into chest, or sour taste in mouth    Negative: Upper abdominal pains and occur regularly about 1 hour after meals    Negative: Abdominal pain is a chronic symptom (recurrent or ongoing AND lasting > 4 weeks)    Protocols used: PREGNANCY - ABDOMINAL PAIN LESS THAN 20 WEEKS EGA-A-OH

## 2022-03-02 ENCOUNTER — OFFICE VISIT (OUTPATIENT)
Dept: OBGYN | Facility: CLINIC | Age: 27
End: 2022-03-02
Payer: COMMERCIAL

## 2022-03-02 VITALS
BODY MASS INDEX: 37.08 KG/M2 | DIASTOLIC BLOOD PRESSURE: 81 MMHG | SYSTOLIC BLOOD PRESSURE: 134 MMHG | HEART RATE: 93 BPM | OXYGEN SATURATION: 99 % | WEIGHT: 228 LBS

## 2022-03-02 DIAGNOSIS — O44.00 PLACENTA PREVIA ANTEPARTUM: ICD-10-CM

## 2022-03-02 DIAGNOSIS — Z34.00 SUPERVISION OF NORMAL FIRST PREGNANCY, ANTEPARTUM: ICD-10-CM

## 2022-03-02 PROCEDURE — 99207 PR PRENATAL VISIT: CPT | Performed by: OBSTETRICS & GYNECOLOGY

## 2022-03-02 NOTE — PATIENT INSTRUCTIONS
If you have any questions regarding your visit, Please contact your care team.     [employer] [facility] [employer]: [phone]  Women s Health CLINIC HOURS TELEPHONE NUMBER       Irineo Wagner M.D.    Edilma-ZULEIKA Islas-ZULEIKA Paige-Medical Assistant    Ani-  Ángela-     Monday-[place]  8:00a.m-4:45 p.m  Tuesday-[place]  9:00a.m-4:00 p.m  Wednesday-[place] 8:00a.m-4:45 p.m.  Thursday-[place]  8:00a.m-4:45 p.m.  Friday-[place]  8:00a.m-4:45 p.m. [facility]  [address] [place] [address] [phone] ask for [facility]  [phone] Fax  Imaging Vedizalljo-[phone]    [facility]  [address] [place] [address] [place] [address]  [phone]    [facility]  [address] [place]  [place] [id number] [phone] ask [facility]  [phone] Fax  Imaging Mhdssbragi-[phone]     Urgent Care locations:    [facility] Monday-Friday  10 am - 8 pm  Saturday and Sunday   9 am - 5 pm  Monday-Friday   10 am - 8 pm  Saturday and Sunday   9 am - 5 pm   [phone]   If you need a medication refill, please contact your pharmacy. Please allow 3 business days for your refill to be completed.  As always, Thank you for trusting us with your healthcare needs!

## 2022-03-02 NOTE — PROGRESS NOTES
No significant signs, symptoms or concerns. Placenta previa resolved per MFM. Weight gain reviewed. Here with partner.    Counseling included the following: Advice appropriate to gestational age reviewed including pertinent Checklist items. Discussed condition, tests and care plan including risks, benefits, and alternatives. Problem list updated.   20 minutes spent on the date of encounter doing chart review, history, examination, discussion with patient, and documentation, and further activities as noted, and review of appropriateness of decision-making for care, and review of outside records.  1h GTT next.  A/P:  Mi was seen today for prenatal care.    Diagnoses and all orders for this visit:    Supervision of normal first pregnancy, antepartum    Placenta previa antepartum        Irineo Wagner MD

## 2022-04-06 ENCOUNTER — PRENATAL OFFICE VISIT (OUTPATIENT)
Dept: OBGYN | Facility: CLINIC | Age: 27
End: 2022-04-06
Payer: COMMERCIAL

## 2022-04-06 VITALS
OXYGEN SATURATION: 98 % | WEIGHT: 236.4 LBS | SYSTOLIC BLOOD PRESSURE: 135 MMHG | HEART RATE: 83 BPM | BODY MASS INDEX: 38.45 KG/M2 | DIASTOLIC BLOOD PRESSURE: 79 MMHG

## 2022-04-06 DIAGNOSIS — Z34.00 SUPERVISION OF NORMAL FIRST PREGNANCY, ANTEPARTUM: Primary | ICD-10-CM

## 2022-04-06 LAB
GLUCOSE 1H P 50 G GLC PO SERPL-MCNC: 125 MG/DL (ref 70–129)
HGB BLD-MCNC: 13.3 G/DL (ref 11.7–15.7)

## 2022-04-06 PROCEDURE — 99207 PR PRENATAL VISIT: CPT | Performed by: OBSTETRICS & GYNECOLOGY

## 2022-04-06 PROCEDURE — 59425 ANTEPARTUM CARE ONLY: CPT | Performed by: OBSTETRICS & GYNECOLOGY

## 2022-04-06 PROCEDURE — 82950 GLUCOSE TEST: CPT | Performed by: OBSTETRICS & GYNECOLOGY

## 2022-04-06 PROCEDURE — 36415 COLL VENOUS BLD VENIPUNCTURE: CPT | Performed by: OBSTETRICS & GYNECOLOGY

## 2022-04-06 NOTE — PROGRESS NOTES
She reports feeling reassuring daily fetal activity and will continue to record.  She gained 8.4 lbs since her last visit and denies any fluid leakage or regular uterine contractions.  Will check her diabetic screen and hgb values today.  She is not a rhogam candidate since her Rh factor is +.  She continues to decline the COVID-19 vaccine, even though it has been advised.

## 2022-04-11 ENCOUNTER — VIRTUAL VISIT (OUTPATIENT)
Dept: FAMILY MEDICINE | Facility: CLINIC | Age: 27
End: 2022-04-11
Payer: COMMERCIAL

## 2022-04-11 DIAGNOSIS — B00.9 HSV (HERPES SIMPLEX VIRUS) INFECTION: Primary | ICD-10-CM

## 2022-04-11 PROCEDURE — 99213 OFFICE O/P EST LOW 20 MIN: CPT | Mod: 95 | Performed by: PHYSICIAN ASSISTANT

## 2022-04-11 NOTE — PROGRESS NOTES
Mi is a 26 year old who is being evaluated via a billable video visit.      How would you like to obtain your AVS? MyChart  If the video visit is dropped, the invitation should be resent by: Text to cell phone: [phone]  Will anyone else be joining your video visit? No    Video Start Time: 7:00 AM    Assessment & Plan     ASSESSMENT/PLAN:      ICD-10-CM    1. HSV (herpes simplex virus) infection  B00.9 Herpes Simplex Virus 1 and 2 IgG     Patient interested in retesting, discussed HSV and limitations of testing. To make lab only appointment. She would like results prior to upcoming midwife appointment and will discuss with them.    Return in about 17 days (around 4/28/2022) for with midwife.    Ally Burnham PA-C  [facility]    Shantell Stark is a 26 year old who presents for the following health issues     HPI     She is 7 months pregnant, due date 7/4/22. First pregnancy  7 years ago she had blood testing done for STD. She was told she had HSV, has never had a breakout. Partner has never had outbreak either.  She would like to switch to water birth, unsure recommendations/guidelines  Next appointment with midwife is 4/28, [facility].    Reviewed care everywhere:  8/2015; You are positive for type one and two herpes  10/2015: treated at [facility] for genital herpes outbreak      Review of Systems   Other than noted above, general, HEENT, respiratory, cardiac, MS, and gastrointestinal systems are negative.       Objective           Vitals:  No vitals were obtained today due to virtual visit.    Physical Exam   GENERAL: Healthy, alert and no distress  EYES: Eyes grossly normal to inspection.  No discharge or erythema, or obvious scleral/conjunctival abnormalities.  RESP: No audible wheeze, cough, or visible cyanosis.  No visible retractions or increased work of breathing.    SKIN: Visible skin clear. No significant rash, abnormal pigmentation or lesions.  NEURO: Cranial  nerves grossly intact.  Mentation and speech appropriate for age.  PSYCH: Mentation appears normal, affect normal/bright, judgement and insight intact, normal speech and appearance well-groomed.          Video-Visit Details    Type of service:  Video Visit    Video End Time:7:14 AM    Originating Location (pt. Location): Home    Distant Location (provider location):  [facility]     Platform used for Video Visit: [employer]

## 2022-04-13 ENCOUNTER — LAB (OUTPATIENT)
Dept: LAB | Facility: CLINIC | Age: 27
End: 2022-04-13
Payer: COMMERCIAL

## 2022-04-13 DIAGNOSIS — B00.9 HSV (HERPES SIMPLEX VIRUS) INFECTION: ICD-10-CM

## 2022-04-13 PROCEDURE — 86696 HERPES SIMPLEX TYPE 2 TEST: CPT

## 2022-04-13 PROCEDURE — 86695 HERPES SIMPLEX TYPE 1 TEST: CPT

## 2022-04-13 PROCEDURE — 36415 COLL VENOUS BLD VENIPUNCTURE: CPT

## 2022-04-14 LAB
HSV1 IGG SERPL QL IA: 23.5 INDEX
HSV1 IGG SERPL QL IA: ABNORMAL
HSV2 IGG SERPL QL IA: 11.2 INDEX
HSV2 IGG SERPL QL IA: ABNORMAL

## 2022-04-30 NOTE — TELEPHONE ENCOUNTER
* Preliminary Report *    PN (Unverified)  DATE:  06/23/2018    SUBJECTIVE:  Patient did well overnight and is swallowing normally, in no acute distress.    PHYSICAL EXAM:  VITAL SIGNS:  Afebrile.  Vital signs stable.  His LESLEE output was 35 mL.   HEENT:  The patient has a pressure dressing in place, which was taken down and he has a dry incision with Steri-Strips in place, and no hematoma or seroma.     NEURO:  Cranial nerves 2-12 are intact bilaterally.    ASSESSMENT:  This is an 19-year-old gentleman, postoperative day #1, status post thyroglossal duct cyst excision.  He is doing well with no acute issues.    PLAN:    1. We will discontinue his LESLEE drain today and replace the pressure dressing.  2. We have discussed all activity restrictions and given him prescriptions for discharge.  3. He has been instructed on keeping the incision clean and dry, and will follow up at his scheduled appointment.        ______________________________  Loc Torres MD    /SHARON  DD:  06/23/2018  Time:  07:46AM  DT:  06/23/2018  Time:  07:58AM  Job #:  [id number]       Result type: Progress Note-Physician  Result date: June 23, 2018 7:58 CDT  Result status: Unauth  Result title: PN  Performed by: Elana WALDROP , Loc SHAW on June 23, 2018 7:46 CDT  Encounter info: [id number], [facility], Observation, 6/22/2018 - 6/23/2018  Contributor system: IntraStage       OB Triage Call    Please call patient [phone] (home), Patient requesting HCG levels, reporting ongoing vaginal spotting.    Is patient's OB/Midwife with the [facility] or [facility]? [facility]- Proceed with triage     Reason for call: Patient calling reporting she is unsure how far along she is in her pregnancy. Reporting vaginal bleeding starting 21, returning again this morning.    Assessment: Patient stating she has had bright red spotting this morning again, that is now lighter in color. Denies pain or cramping. Patient was seen in ED on 21 diagnosed with Subchorionic Hemorrhage      Plan: Message routed to clinic to advise on follow up. HCG levels are in process.        Patient's primary OB Provider is Dr Wagner.      Per protocol recommendations Patient to schedule follow up appointment within 2-3 days.     Is patient's delivering hospital on divert? No      6w2d    Estimated Date of Delivery: 2022        OB History    Para Term  AB Living   1 0 0 0 0 0   SAB IAB Ectopic Multiple Live Births   0 0 0 0 0      # Outcome Date GA Lbr Layo/2nd Weight Sex Delivery Anes PTL Lv   1 Current                No results found for: GBS       Joelle Cruz RN 11/10/21 6:34 AM  [facility] Nurse Advisor    Reason for Disposition    SPOTTING lasts > 48 hours or spotting happens more than once in a week    Additional Information    Negative: Shock suspected (e.g., cold/pale/clammy skin, too weak to stand, low BP, rapid pulse)    Negative: Difficult to awaken or acting confused (e.g., disoriented, slurred speech)    Negative: Passed out (i.e., lost consciousness, collapsed and was not responding)    Negative: Sounds like a life-threatening emergency to the triager    Negative: [1] Vaginal bleeding AND [2] pregnant > 20 weeks    Negative: Not pregnant or pregnancy status unknown    Negative: SEVERE abdominal pain    Negative: [1] SEVERE vaginal bleeding (e.g., soaking 2 pads / hour,  "large blood clots) AND [2] present 2 or more hours    Negative: SEVERE dizziness (e.g., unable to stand, requires support to walk, feels like passing out)    Negative: [1] MODERATE vaginal bleeding (i.e., soaking 1 pad / hour; clots) AND [2] present > 6 hours    Negative: [1] MODERATE vaginal bleeding (i.e., soaking 1 pad / hour; clots) AND [2] pregnant > 12 weeks    Negative: Passed tissue (e.g., gray-white)    Negative: Shoulder pain    Negative: Pale skin (pallor) of new onset or worsening    Negative: Patient sounds very sick or weak to the triager    Negative: [1] Constant abdominal pain AND [2] present > 2 hours    Negative: Fever > 100.4 F (38.0 C)    Negative: [1] Intermittent lower abdominal pain (e.g., cramping) AND [2] present > 24 hours    Negative: Prior history of \"ectopic pregnancy\" or previous tubal surgery (e.g., tubal ligation)    Negative: Pain or burning with passing urine (urination)    Negative: MODERATE vaginal bleeding (i.e., soaking 1 pad / hour; clots)    Negative: Has IUD    Negative: MILD vaginal bleeding (i.e., less than 1 pad / hour; less than patient's usual menstrual bleeding; not just spotting)    Protocols used: PREGNANCY - VAGINAL BLEEDING LESS THAN 20 WEEKS EGA-A-AH      "

## 2022-05-03 ENCOUNTER — OFFICE VISIT (OUTPATIENT)
Dept: FAMILY MEDICINE | Facility: CLINIC | Age: 27
End: 2022-05-03
Payer: COMMERCIAL

## 2022-05-03 VITALS
DIASTOLIC BLOOD PRESSURE: 58 MMHG | SYSTOLIC BLOOD PRESSURE: 122 MMHG | TEMPERATURE: 98.6 F | OXYGEN SATURATION: 96 % | HEART RATE: 115 BPM

## 2022-05-03 DIAGNOSIS — M65.4 DE QUERVAIN'S DISEASE (TENOSYNOVITIS): Primary | ICD-10-CM

## 2022-05-03 DIAGNOSIS — Z3A.31 31 WEEKS GESTATION OF PREGNANCY: ICD-10-CM

## 2022-05-03 PROCEDURE — 99213 OFFICE O/P EST LOW 20 MIN: CPT | Performed by: PHYSICIAN ASSISTANT

## 2022-05-03 ASSESSMENT — PAIN SCALES - GENERAL: PAINLEVEL: SEVERE PAIN (6)

## 2022-05-03 NOTE — PROGRESS NOTES
"  Assessment & Plan     De Quervain's disease (tenosynovitis)  31 weeks gestation of pregnancy  Encouraged wearing thumb spica wrist brace which was provided to her today. Can consult with orthopedics to see if corticosteroid injection would be an option as she is in quite a bit of discomfort and not responding to conservative home management. She agrees with this plan. Could also consider hand therapy. No further questions.  - Orthopedic  Referral; Future  - Wrist/Arm/Hand Supplies Order for DME - ONLY FOR DME       BMI:   Estimated body mass index is 38.45 kg/m  as calculated from the following:    Height as of 7/20/21: 1.67 m (5' 5.75\").    Weight as of 4/6/22: 107.2 kg (236 lb 6.4 oz).           Return in about 4 weeks (around 5/31/2022) for worsening symptoms or failure to improve..    W[facility] BONITA Stark is a 26 year old who presents for the following health issues     Musculoskeletal Problem    History of Present Illness       Reason for visit:  Wrist pain  Symptom onset:  3-4 weeks ago  Symptom intensity:  Moderate  Symptom progression:  Worsening    She eats 4 or more servings of fruits and vegetables daily.She consumes 1 sweetened beverage(s) daily.She exercises with enough effort to increase her heart rate 30 to 60 minutes per day.  She exercises with enough effort to increase her heart rate 3 or less days per week.   She is taking medications regularly.     Left wrist pain. Is 31w1d pregnant.  No injury to the wrist that she can recall.  Has been trying wrist brace - wore at night for about a week. Lidocaine gel. Tylenol. Ice.           Review of Systems   Constitutional, HEENT, cardiovascular, pulmonary, gi and gu systems are negative, except as otherwise noted.      Objective    /58 (BP Location: Right arm, Patient Position: Chair, Cuff Size: Adult Large)   Pulse 115   Temp 98.6  F (37  C) (Oral)   LMP 09/27/2021 (Exact " Date)   SpO2 96%   There is no height or weight on file to calculate BMI.  Physical Exam   GENERAL: healthy, alert and no distress  MS: left wrist: tenderness to palpation along radial aspect of wrist, no ecchymosis, no swelling. + Finkelstein's.

## 2022-05-09 NOTE — PROGRESS NOTES
Chief Complaint:   Chief Complaint   Patient presents with     Left Wrist - Pain     Patient had been experiencing wrist numbness prior to the pain. Left wrist pain that started about 1.5 months ago. No known Injury. Pain is sharp, aching, constant. Movement and writing makes wrist pain worse. States nothing makes it feel better. She had tried a wrist brace that didn't help because it makes her wrist go numb. She has tried tylenol without relief. Most pain when bending her wrist. Patient has not had any prior imaging since she is currently pregnant.         HPI: Mi Salgado is a 26 year old female , left -hand dominant, who presents for evaluation and management of a right wrist pain, no known injury. Pain has been present for 1.5 months. Locates pain along the side of the wrist (radial), getting worse. Aggravated with activities.    Started numbness and tingling with pregnancy in both hands, all fingers, now almost 8 months pregnant. Numbness and tingling started well before the wrist pain.    Wrist brace makes hands more numb. Better with shaking the hands.      She reports having moderate  pain/discomfort around the wrist site.   Symptoms: pain left wrist, numbness and tingling bilateral hands.  Location of symptoms: radial wrist.  Pain severity: 7/10  Pain quality: aching and sharp  Frequency of symptoms: are constant  Aggravating factors: activities, writing.  Relieving factors: shaking hands.    Previous treatment: ice, tylenol, brace    Past medical history:  has a past medical history of ADHD (attention deficit hyperactivity disorder) (09/20/2012), Anxiety (12/18/2017), Chlamydia (2013), Chronic post-traumatic stress disorder (PTSD) (09/13/2018), Cluster B personality disorder (H) (12/29/2011), Genital HSV (2015), Gonorrhea (2013), Oppositional defiant disorder of childhood or adolescence, and PCOS (polycystic ovarian syndrome) (9/27/2017).    She has no past medical history of Arthritis, Asthma, Blood  transfusion, Congestive heart failure, unspecified, COPD (chronic obstructive pulmonary disease) (H), Coronary artery disease, Diabetes mellitus (H), History of blood transfusion, Hypertension, Malignant neoplasm (H), Thyroid disease, or Unspecified cerebral artery occlusion with cerebral infarction.     Patient Active Problem List    Diagnosis Date Noted     Supervision of normal first pregnancy, antepartum 11/24/2021     Priority: Medium     Boy.  NIPT neg.        PCOS (polycystic ovarian syndrome) 09/27/2017     Priority: Medium     Past surgical history:  has a past surgical history that includes no history of surgery.     Medications:    Current Outpatient Medications   Medication Sig Dispense Refill     Prenatal Vit-Fe Fumarate-FA (PRENATAL MULTIVITAMIN W/IRON) 27-0.8 MG tablet Take 1 tablet by mouth daily 90 tablet 3        Allergies:     Allergies   Allergen Reactions     Latex Rash        Family History: family history includes Depression in an other family member; No Known Problems in her father, maternal grandfather, maternal grandmother, mother, sister, and sister; Stillborn in her sister.     Social History:  reports that she quit smoking about 17 months ago. Her smoking use included cigarettes. She smoked 0.25 packs per day. She has never used smokeless tobacco. She reports previous drug use. Drugs: Marijuana and Amphetamines. She reports that she does not drink alcohol.    Review of Systems:  ROS: 10 point ROS neg other than the symptoms noted above in the HPI and past medical history.    Physical Exam  GENERAL APPEARANCE: healthy, alert, no distress.   SKIN: no suspicious lesions or rashes  NEURO: Normal strength and tone, mentation intact and speech normal  PSYCH:  mentation appears normal and affect normal. Not anxious.  RESPIRATORY: No increased work of breathing.    /79 (BP Location: Right arm, Patient Position: Sitting, Cuff Size: Adult Large)   Pulse 104   LMP 09/27/2021 (Exact Date)    SpO2 95%      left HAND / WRIST EXAM:    Skin intact. No abnormal skin discoloration, erythema or ecchymosis.   Normal wear pattern, color and tone.  No observable or palpable masses of the fingers or palm or wrist.  No palpable triggering of fingers.   No observable or palpable cords or nodules of the fingers or palm.    There is mild swelling in the 1st dorsal compartment of the wrist.  There is severe tenderness in the 1st dorsal compartment of the wrist.  There is no ecchymosis.  There is no erythema of the surrounding skin.  There is no maceration of the skin.  There is no deformity in the area.  Intact extensors. No extensor lag.    Special tests wrist:    Tinel's equivocal,    Phalen's positive.    Flexion/compression test positive.   Finkelstein's test: positive.   Ulnar piano sign: negative   Ulnar foveal tenderness:  negative    Special tests medial elbow ulnar nerve:    Tinel's negative,    Flexion/compression test negative.    Special tests median nerve proximal forearm:    Tinel's negative.    1st carpometacarpal grind: negative    Intact sensation light touch median, radial, ulnar nerves of the hand  Intact sensation to the radial and ulnar digital nerves of the fingers, as well as the finger tips.  Intact epl fpl fdp edc wrist flexion/extension biceps/triceps deltoid  Brisk capillary refill to all fingers.   Palpable radial pulse, 2+.    RIGHT HAND / WRIST EXAM:    Skin intact. No abnormal skin discoloration, erythema or ecchymosis.   Normal wear pattern, color and tone.  No observable or palpable masses of the fingers or palm or wrist.  No palpable triggering of fingers.   No observable or palpable cords or nodules of the fingers or palm.    There is no  There is no tenderness in the wrist  There is no ecchymosis.  There is no erythema of the surrounding skin.  There is no maceration of the skin.  There is no deformity in the area.  Intact extensors. No extensor lag.    Special tests wrist:     Tinel's equivocal,    Phalen's positive.    Flexion/compression test positive.   Finkelstein's test: negative.   Ulnar piano sign: negative   Ulnar foveal tenderness:  negative    Special tests medial elbow ulnar nerve:    Tinel's negative,    Flexion/compression test negative.    Special tests median nerve proximal forearm:    Tinel's negative.    1st carpometacarpal grind: negative    Intact sensation light touch median, radial, ulnar nerves of the hand  Intact sensation to the radial and ulnar digital nerves of the fingers, as well as the finger tips.  Intact epl fpl fdp edc wrist flexion/extension biceps/triceps deltoid  Brisk capillary refill to all fingers.   Palpable radial pulse, 2+.        X-rays: none today given pregnancy.    Assessment: 27yo LHD female with:  1) left wrist dequervains tenosynovitis  2) bilateral gestational carpal tunnel syndrome .    Plan:  * carpal tunnel syndrome likely will resolve with having the baby, but will monitor. Continue braces at night.    * for the wrist pain, consistent with tendinitis.  Nonoperative treatment. Expect improvement in most cases 6-8 weeks.  * Rest. Immobilization in removable thumb spica splints.   * will refer to Hand Therapy [facility] for thumb based handsplints, as well as treatment modalities as indicated.  * Activity modification - avoid activities that aggravate symptoms.  * NSAIDS - hold off given pregnancy. Discuss with primary care provider appropriate analgesics, like acetaminophen.  * Ice twice daily to three times daily.  * Tylenol as needed for pain  * Injections: discussed injections, low risk during pregnancy but some risk is possible. Patient states her primary sent her here for an injection so is ok. She understands the risks and willing to proceed with injection.  * Return to clinic as needed.            Silvano Mathews M.D., M.S.  Dept. of Orthopaedic Surgery  [facility]    Hand / Upper Extremity  Injection/Arthrocentesis: L extensor compartment 1    Date/Time: 5/13/2022 11:12 AM  Performed by: Silvano Mathews MD  Authorized by: Silvano Mathews MD     Indications:  Pain  Needle Size:  25 G  Guidance: landmark    Condition: de Quervain's      Site:  L extensor compartment 1  Medications:  20 mg triamcinolone 40 MG/ML  Outcome:  Tolerated well, no immediate complications  Procedure discussed: discussed risks, benefits, and alternatives    Consent Given by:  Patient  Timeout: timeout called immediately prior to procedure    Prep: patient was prepped and draped in usual sterile fashion     0.5mL 0.25% Bupivacaine 125 mg per mL    NDC: 94322-136-67  LOT: [id number]  EXP: 05-30-23

## 2022-05-13 ENCOUNTER — OFFICE VISIT (OUTPATIENT)
Dept: ORTHOPEDICS | Facility: CLINIC | Age: 27
End: 2022-05-13
Attending: PHYSICIAN ASSISTANT
Payer: COMMERCIAL

## 2022-05-13 VITALS — OXYGEN SATURATION: 95 % | DIASTOLIC BLOOD PRESSURE: 79 MMHG | SYSTOLIC BLOOD PRESSURE: 116 MMHG | HEART RATE: 104 BPM

## 2022-05-13 DIAGNOSIS — M65.4 DE QUERVAIN'S DISEASE (TENOSYNOVITIS): Primary | ICD-10-CM

## 2022-05-13 PROCEDURE — 20550 NJX 1 TENDON SHEATH/LIGAMENT: CPT | Mod: LT | Performed by: ORTHOPAEDIC SURGERY

## 2022-05-13 PROCEDURE — 99243 OFF/OP CNSLTJ NEW/EST LOW 30: CPT | Mod: 25 | Performed by: ORTHOPAEDIC SURGERY

## 2022-05-13 RX ORDER — TRIAMCINOLONE ACETONIDE 40 MG/ML
20 INJECTION, SUSPENSION INTRA-ARTICULAR; INTRAMUSCULAR
Status: DISCONTINUED | OUTPATIENT
Start: 2022-05-13 | End: 2022-09-07

## 2022-05-13 RX ADMIN — TRIAMCINOLONE ACETONIDE 20 MG: 40 INJECTION, SUSPENSION INTRA-ARTICULAR; INTRAMUSCULAR at 11:12

## 2022-05-13 ASSESSMENT — PAIN SCALES - GENERAL: PAINLEVEL: SEVERE PAIN (7)

## 2022-05-29 ENCOUNTER — APPOINTMENT (OUTPATIENT)
Dept: URGENT CARE | Facility: CLINIC | Age: 27
End: 2022-05-29
Payer: COMMERCIAL

## 2022-07-15 ENCOUNTER — TELEPHONE (OUTPATIENT)
Dept: FAMILY MEDICINE | Facility: CLINIC | Age: 27
End: 2022-07-15

## 2022-07-18 ENCOUNTER — NURSE TRIAGE (OUTPATIENT)
Dept: NURSING | Facility: CLINIC | Age: 27
End: 2022-07-18

## 2022-07-18 NOTE — TELEPHONE ENCOUNTER
I do not have availability today. She should be seen.     Provider Response to 2nd Level Triage Request    I have reviewed the RN documentation. My recommendation is:  Refer to Urgent Care     Ananya Eugene PA-C

## 2022-07-18 NOTE — TELEPHONE ENCOUNTER
Patient notified of provider's message as written. Patient verbalized understanding.     Gisela Reynolds RN   [facility]

## 2022-07-18 NOTE — TELEPHONE ENCOUNTER
Nurse Triage SBAR    Is this a 2nd Level Triage?   Yes    Situation:    incision concerns    Background/Assessment:     Pt had a  on 2022  Pt reporting, the incision on the left side.   Is red, warm, tender to the touch, painful, and bleeding just a little bit.   This has been going on for a few days.  No fever, hot sweats, cold sweats or the chills.    Pt wondering if there is an infection going on.     Would like to be seen today.    Please call the Pt back @ [phone] for further assistance.    Ani Kohler RN  Central Triage Red Flags/Med Refills      Protocol Recommended Disposition:   Go To Office Now      Reason for Disposition    Incision looks infected (spreading redness, pain)    Additional Information    Negative: Major abdominal surgical incision and wound gaping open with visible internal organs    Negative: Sounds like a life-threatening emergency to the triager    Negative: Bleeding from incision and won't stop after 10 minutes of direct pressure    Negative: Widespread rash and bright red, sunburn-like    Negative: Severe pain in the incision    Negative: Incision gaping open and < 2 days (48 hours) since wound re-opened    Negative: Incision gaping open and length of opening > 2 inches (5 cm)    Negative: Patient sounds very sick or weak to the triager    Negative: Sounds like a serious complication to the triager    Negative: Fever > 100.4 F (38.0 C)    Protocols used: POST-OP INCISION SYMPTOMS AND TIYLLBYLY-L-WG

## 2022-08-05 ENCOUNTER — TELEPHONE (OUTPATIENT)
Dept: OBGYN | Facility: CLINIC | Age: 27
End: 2022-08-05

## 2022-08-05 NOTE — TELEPHONE ENCOUNTER
Pt has not seen us since 2022. Pt has PP schedueld with Dr. Porter on 8/10/2022.    Pt had OB care and delivered with [facility], Pt should have post partum with that provider group, especially if she has any  incision concerns as they are more familiar with her and did her .    Unable to reach patient via phone. RN left a message and instructed patient to call the clinic at [phone].    Edilma Murillo RN on 2022 at 1:30 PM

## 2022-08-08 NOTE — TELEPHONE ENCOUNTER
Called patient and relayed message below.     Patient stated she was scheduled for natural birth with [facility] and because of her blood pressure was transferred to [facility] for a . She stated we have rescheduled this visit twice and that she just needs to be seen for a post-partum visit to be cleared for working out.     Patient stated she had been seen for prenatal care at [facility] and is having no concerns with her incision. Patient very frustrated with me.     Let patient know I would pass message along.    Please advise.     Lyly MULTANI 2022 8:11 AM

## 2022-08-10 ENCOUNTER — PRENATAL OFFICE VISIT (OUTPATIENT)
Dept: OBGYN | Facility: CLINIC | Age: 27
End: 2022-08-10
Payer: COMMERCIAL

## 2022-08-10 VITALS
HEART RATE: 84 BPM | WEIGHT: 244 LBS | SYSTOLIC BLOOD PRESSURE: 122 MMHG | DIASTOLIC BLOOD PRESSURE: 82 MMHG | BODY MASS INDEX: 39.68 KG/M2 | OXYGEN SATURATION: 97 %

## 2022-08-10 PROBLEM — Z34.00 SUPERVISION OF NORMAL FIRST PREGNANCY, ANTEPARTUM: Status: RESOLVED | Noted: 2021-11-24 | Resolved: 2022-08-10

## 2022-08-10 ASSESSMENT — PATIENT HEALTH QUESTIONNAIRE - PHQ9: SUM OF ALL RESPONSES TO PHQ QUESTIONS 1-9: 0

## 2022-08-10 NOTE — LETTER
August 10, 2022      RE: Mi Salgado  [address] [place] [id number]       To whom it may concern:    Mi Salgado was seen in our clinic today. She  may return to work with no restrictions.  She is planning to return to work on September 25, 2022     Sincerely,          Yevgeniy Porter MD

## 2022-08-10 NOTE — LETTER
August 10, 2022      RE: Mi Salgado  [facility] [address]       To whom it may concern:    Mi Salgado was seen in our clinic today. She is medically released for return to work.  She is planning to take FMLA to September, 25, 2022.      Sincerely,            Yevgeniy Porter MD

## 2022-08-10 NOTE — PROGRESS NOTES
POSTPARTUM VISIT:    Delivery Information:    Date:  06/30/2022  Route:  C/Section   Sex:  male    Weight:  3810 g       Apgars:  8/9  Reviewed pregnancy and birth.  Doing well.  No significant symptoms.  Infant doing fine.  Breast feeding:  no  Bottle:  yes  Formula:  yes    Exam:  /82 (BP Location: Right arm, Cuff Size: Adult Large)   Pulse 84   Wt 110.7 kg (244 lb)   LMP 09/27/2021 (Exact Date)   SpO2 97%   Breastfeeding No   BMI 39.68 kg/m    PHQ-9 = 0  General:  WNWD female, NAD  Alert  Oriented x 3  Gait:  Normal  Skin:  Normal skin turgor  HEENT:  NC/AT, EOMI  Abdomen:  Non-tender, non-distended.  Pelvic exam:  Not performed     Reviewed contraception plans.  We reviewed the options available, the side effects, risks, benefits and instructions on proper use.  She is not requesting contraception.   Continue general medical care.  TUCKS and OTC Hydrocortisone cream for hemorrhoid.     Yevgeniy Porter MD

## 2022-08-20 ENCOUNTER — MYC MEDICAL ADVICE (OUTPATIENT)
Dept: OBGYN | Facility: CLINIC | Age: 27
End: 2022-08-20

## 2022-08-20 NOTE — LETTER
[facility]  [address] [facility] [address]  [phone]          August 22, 2022    Mi Salgado                                                                                                                     [facility] [facility] [address]            To whom it may concern,    The above patient is under my professional medical care for a recent pregnancy and delivery.  She is able to return to work without any restrictions starting on Saturday, August 27th, 2022.        Sincerely,         Yevgeniy Porter MD

## 2022-09-01 ENCOUNTER — OFFICE VISIT (OUTPATIENT)
Dept: INTERNAL MEDICINE | Facility: CLINIC | Age: 27
End: 2022-09-01
Payer: COMMERCIAL

## 2022-09-01 VITALS
SYSTOLIC BLOOD PRESSURE: 126 MMHG | HEART RATE: 82 BPM | RESPIRATION RATE: 18 BRPM | OXYGEN SATURATION: 98 % | DIASTOLIC BLOOD PRESSURE: 82 MMHG | BODY MASS INDEX: 39.68 KG/M2 | HEIGHT: 66 IN

## 2022-09-01 DIAGNOSIS — M65.4 DE QUERVAIN'S DISEASE (TENOSYNOVITIS): Primary | ICD-10-CM

## 2022-09-01 PROBLEM — O13.9 GESTATIONAL HYPERTENSION, ANTEPARTUM: Status: ACTIVE | Noted: 2022-06-30

## 2022-09-01 PROCEDURE — 99213 OFFICE O/P EST LOW 20 MIN: CPT | Performed by: INTERNAL MEDICINE

## 2022-09-01 NOTE — PATIENT INSTRUCTIONS
Plan:  1. Ortho referral   2. Diclofenac gel - over the counter 2-3 times a day to the wrist  3. Wrist with thumb splint

## 2022-09-01 NOTE — PROGRESS NOTES
"  Dr Mckeon's note      Patient's instructions / PLAN:                                                        Plan:  1. Ortho referral   2. Diclofenac gel - over the counter 2-3 times a day to the wrist  3. Wrist with thumb splint         ASSESSMENT & PLAN:                                                      (M65.4) De Quervain's disease (tenosynovitis)  (primary encounter diagnosis)  Comment:   Plan: Orthopedic  Referral          While she was in the office with me, she was able to enter my chart and schedule appointment with Ortho for September 7    Chief complaint:                                                      R wrist pain    SUBJECTIVE:                                                    History of present illness:    The patient was evaluated in June 2022 by Ortho, Dr. Brink.  She was diagnosed with de Quervain's disease of the left wrist and she received cortisone injection, which helped.    The patient called the central scheduling asking for Ortho appointment and she was given an appointment in internal medicine today.  She was very disappointed that I do not do cortisone injections.     2 m baby boy. Not breast feeding              OBJECTIVE:             Physical exam:  Blood pressure 126/82, pulse 82, resp. rate 18, height 1.67 m (5' 5.75\"), SpO2 98 %, not currently breastfeeding.     NAD, appears comfortable  Skin: no rashes   Extremities: no edema, she has tenderness at the base of the right thumb and on the lateral aspect of the wrist.  There are no signs of arthritis, no swelling of the joints  Neurologic: A, Ox3, no focal signs appreciated    PMHx: reviewed  Past Medical History:   Diagnosis Date     ADHD (attention deficit hyperactivity disorder) 09/20/2012    resolved     Anxiety 12/18/2017    improved     Chlamydia 2013    confidential     Chronic post-traumatic stress disorder (PTSD) 09/13/2018     Cluster B personality disorder (H) 12/29/2011    Per psych discharge summary dated " 12/19/11      Genital HSV 2015    positive serology; confidential     Gestational hypertension, antepartum 6/30/2022     Gonorrhea 2013    confidential     Oppositional defiant disorder of childhood or adolescence     resolved     PCOS (polycystic ovarian syndrome) 9/27/2017      PSHx: reviewed  Past Surgical History:   Procedure Laterality Date     NO HISTORY OF SURGERY          Meds: reviewed  Current Outpatient Medications   Medication Sig Dispense Refill     acetaminophen (TYLENOL) 325 MG tablet Take 650 mg by mouth       ibuprofen (ADVIL/MOTRIN) 200 MG tablet Take 600 mg by mouth       oxyCODONE (ROXICODONE) 5 MG tablet Take 5-10 mg by mouth       Prenatal Vit-Fe Fumarate-FA (PRENATAL MULTIVITAMIN W/IRON) 27-0.8 MG tablet Take 1 tablet by mouth daily 90 tablet 3       Soc Hx: reviewed  Fam Hx: reviewed          Daphnie Mckeon MD  Internal Medicine

## 2022-09-01 NOTE — NURSING NOTE
"/82   Pulse 82   Resp 18   Ht 1.67 m (5' 5.75\")   SpO2 98%   BMI 39.68 kg/m    Patient in for Tendinitis feeling in left arm.  "

## 2022-09-04 NOTE — PROGRESS NOTES
Chief Complaint:   Chief Complaint   Patient presents with     Right Wrist - Pain     Patient notes she is having right wrist pain after pregnancy. It is like the left wrist pain she had. Pain is on the radial side of wrist. She is not able to move her thumb because of the pain. She has been taking ibuprofen, massage, wrist brace but nothing helps.         HPI: Mi Salgado is a 27 year old female , left -hand dominant, who presents for evaluation and management of a right wrist pain, no known injury. Onset with delivery of her  son, 2022. Had a left wrist dequervains injection 2022. Returns today with right wrist pain, feels like the left wrist pain she had. Pain is severe.    Locates pain along the side of the wrist (radial), getting worse. Aggravated with activities.    Denies numbness and tingling today. Had numbness and tingling with pregnancy in both hands, all fingers.        She reports having moderate-severe  pain/discomfort around the wrist site.   Symptoms: pain left wrist, numbness and tingling bilateral hands.  Location of symptoms: radial wrist.  Pain severity: 8/10  Pain quality: aching and sharp  Frequency of symptoms: are constant  Aggravating factors: activities, writing.  Relieving factors: shaking hands.    Previous treatment: ice, tylenol, brace    Past medical history:  has a past medical history of ADHD (attention deficit hyperactivity disorder) (2012), Anxiety (2017), Chlamydia (), Chronic post-traumatic stress disorder (PTSD) (2018), Cluster B personality disorder (H) (2011), Genital HSV (), Gestational hypertension, antepartum (2022), Gonorrhea (), Oppositional defiant disorder of childhood or adolescence, and PCOS (polycystic ovarian syndrome) (2017).    She has no past medical history of Arthritis, Asthma, Blood transfusion, Congestive heart failure, unspecified, COPD (chronic obstructive pulmonary disease) (H), Coronary  artery disease, Diabetes mellitus (H), History of blood transfusion, Malignant neoplasm (H), Thyroid disease, or Unspecified cerebral artery occlusion with cerebral infarction.     Patient Active Problem List    Diagnosis Date Noted     Delivery of pregnancy by  section 2022     Priority: Medium     Dysfunctional labor 2022     Priority: Medium     Gestational hypertension, antepartum 2022     Priority: Medium     Past surgical history:  has a past surgical history that includes no history of surgery.     Medications:    Current Outpatient Medications   Medication Sig Dispense Refill     acetaminophen (TYLENOL) 325 MG tablet Take 650 mg by mouth       ibuprofen (ADVIL/MOTRIN) 200 MG tablet Take 600 mg by mouth       oxyCODONE (ROXICODONE) 5 MG tablet Take 5-10 mg by mouth       Prenatal Vit-Fe Fumarate-FA (PRENATAL MULTIVITAMIN W/IRON) 27-0.8 MG tablet Take 1 tablet by mouth daily 90 tablet 3        Allergies:     Allergies   Allergen Reactions     Latex Rash        Family History: family history includes Depression in an other family member; No Known Problems in her father, maternal grandfather, maternal grandmother, mother, sister, and sister; Stillborn in her sister.     Social History:  reports that she quit smoking about 21 months ago. Her smoking use included cigarettes. She smoked 0.25 packs per day. She has never used smokeless tobacco. She reports previous drug use. Drugs: Marijuana and Amphetamines. She reports that she does not drink alcohol.    Review of Systems:    Denies numbness, tingling, parasthesias.   Denies headaches.   Denies fevers, chills, night sweats   Denies chest pain.   Denies shortness of breath.   Denies any skin problems, abrasions, rashes, irritation.      Physical Exam  GENERAL APPEARANCE: healthy, alert, no distress. Accompanied by .  SKIN: no suspicious lesions or rashes  NEURO: Normal strength and tone, mentation intact and speech normal  PSYCH:   "mentation appears normal and affect normal. Not anxious.  RESPIRATORY: No increased work of breathing.    /76   Pulse 69   Ht 1.67 m (5' 5.75\")   BMI 39.68 kg/m       left HAND / WRIST EXAM:    Not examined, holding baby.      RIGHT HAND / WRIST EXAM:    Skin intact. No abnormal skin discoloration, erythema or ecchymosis.   Normal wear pattern, color and tone.  No observable or palpable masses of the fingers or palm or wrist.  No palpable triggering of fingers.   No observable or palpable cords or nodules of the fingers or palm.    There is mild 1st extensor swelling.  There is severe tenderness in the wrist at the radial styloid, 1st extensor compartment.  There is no ecchymosis.  There is no erythema of the surrounding skin.  There is no maceration of the skin.  There is no deformity in the area.  Intact extensors. No extensor lag.    Special tests wrist:    Finkelstein's test: positive.   Ulnar piano sign: negative   Ulnar foveal tenderness:  negative    1st carpometacarpal grind: negative    Intact sensation light touch median, radial, ulnar nerves of the hand  Intact sensation to the radial and ulnar digital nerves of the fingers, as well as the finger tips.  Intact epl fpl fdp edc wrist flexion/extension biceps/triceps deltoid  Brisk capillary refill to all fingers.   Palpable radial pulse, 2+.        X-rays: none today       Assessment: 28yo LHD female with acute right wrist dequervains tenosynovitis      Plan:  * for the wrist pain, consistent with tendinitis. Common with taking care of newborns.    Nonoperative treatment. Expect improvement in most cases 6-8 weeks.  * Rest. Immobilization in removable thumb spica splints.   * will refer to [facility] for thumb based handsplints, as well as treatment modalities as indicated.  * Activity modification - avoid activities that aggravate symptoms.  * NSAIDS -  Discuss with primary care provider appropriate analgesics, like " acetaminophen.  * Ice twice daily to three times daily.  * Tylenol as needed for pain  * Injections: discussed injections. She understands the risks and willing to proceed with injection. She is not breastfeeding.  * Return to clinic as needed.            Silvano Mathews M.D., M.S.  Dept. of Orthopaedic Surgery  [facility]    Hand / Upper Extremity Injection/Arthrocentesis: R extensor compartment 1    Date/Time: 9/7/2022 9:20 AM  Performed by: Demarcus Gutierrez PA  Authorized by: Silvano Mathews MD     Indications:  Pain  Needle Size:  22 G  Guidance: landmark    Approach:  Radial  Condition: de Quervain's      Site:  R extensor compartment 1  Medications:  20 mg triamcinolone 40 MG/ML; 1 mL bupivacaine HCl (PF) 0.25 %  Outcome:  Tolerated well, no immediate complications  Procedure discussed: discussed risks, benefits, and alternatives    Consent Given by:  Patient  Prep: patient was prepped and draped in usual sterile fashion

## 2022-09-07 ENCOUNTER — OFFICE VISIT (OUTPATIENT)
Dept: ORTHOPEDICS | Facility: CLINIC | Age: 27
End: 2022-09-07
Payer: COMMERCIAL

## 2022-09-07 VITALS
BODY MASS INDEX: 39.68 KG/M2 | HEART RATE: 69 BPM | SYSTOLIC BLOOD PRESSURE: 100 MMHG | HEIGHT: 66 IN | DIASTOLIC BLOOD PRESSURE: 76 MMHG

## 2022-09-07 DIAGNOSIS — M65.4 TENOSYNOVITIS, DE QUERVAIN: Primary | ICD-10-CM

## 2022-09-07 DIAGNOSIS — M65.4 DE QUERVAIN'S DISEASE (TENOSYNOVITIS): ICD-10-CM

## 2022-09-07 PROCEDURE — 20550 NJX 1 TENDON SHEATH/LIGAMENT: CPT | Mod: RT | Performed by: ORTHOPAEDIC SURGERY

## 2022-09-07 PROCEDURE — 99213 OFFICE O/P EST LOW 20 MIN: CPT | Mod: 25 | Performed by: ORTHOPAEDIC SURGERY

## 2022-09-07 RX ORDER — TRIAMCINOLONE ACETONIDE 40 MG/ML
20 INJECTION, SUSPENSION INTRA-ARTICULAR; INTRAMUSCULAR
Status: SHIPPED | OUTPATIENT
Start: 2022-09-07

## 2022-09-07 RX ORDER — BUPIVACAINE HYDROCHLORIDE 2.5 MG/ML
1 INJECTION, SOLUTION EPIDURAL; INFILTRATION; INTRACAUDAL
Status: DISCONTINUED | OUTPATIENT
Start: 2022-09-07 | End: 2023-05-19

## 2022-09-07 RX ADMIN — BUPIVACAINE HYDROCHLORIDE 1 ML: 2.5 INJECTION, SOLUTION EPIDURAL; INFILTRATION; INTRACAUDAL at 09:20

## 2022-09-07 RX ADMIN — TRIAMCINOLONE ACETONIDE 20 MG: 40 INJECTION, SUSPENSION INTRA-ARTICULAR; INTRAMUSCULAR at 09:20

## 2022-09-07 ASSESSMENT — PAIN SCALES - GENERAL: PAINLEVEL: EXTREME PAIN (8)

## 2022-09-07 NOTE — LETTER
2022         RE: Mi Salgado  [address] [place] [id number]        Dear Colleague,    Thank you for referring your patient, Mi Salgado, to the [facility]. Please see a copy of my visit note below.    Chief Complaint:   Chief Complaint   Patient presents with     Right Wrist - Pain     Patient notes she is having right wrist pain after pregnancy. It is like the left wrist pain she had. Pain is on the radial side of wrist. She is not able to move her thumb because of the pain. She has been taking ibuprofen, massage, wrist brace but nothing helps.         HPI: Mi Salgado is a 27 year old female , left -hand dominant, who presents for evaluation and management of a right wrist pain, no known injury. Onset with delivery of her  son, 2022. Had a left wrist dequervains injection 2022. Returns today with right wrist pain, feels like the left wrist pain she had. Pain is severe.    Locates pain along the side of the wrist (radial), getting worse. Aggravated with activities.    Denies numbness and tingling today. Had numbness and tingling with pregnancy in both hands, all fingers.        She reports having moderate-severe  pain/discomfort around the wrist site.   Symptoms: pain left wrist, numbness and tingling bilateral hands.  Location of symptoms: radial wrist.  Pain severity: 8/10  Pain quality: aching and sharp  Frequency of symptoms: are constant  Aggravating factors: activities, writing.  Relieving factors: shaking hands.    Previous treatment: ice, tylenol, brace    Past medical history:  has a past medical history of ADHD (attention deficit hyperactivity disorder) (2012), Anxiety (2017), Chlamydia (), Chronic post-traumatic stress disorder (PTSD) (2018), Cluster B personality disorder (H) (2011), Genital HSV (), Gestational hypertension, antepartum (2022), Gonorrhea (), Oppositional defiant disorder  of childhood or adolescence, and PCOS (polycystic ovarian syndrome) (2017).    She has no past medical history of Arthritis, Asthma, Blood transfusion, Congestive heart failure, unspecified, COPD (chronic obstructive pulmonary disease) (H), Coronary artery disease, Diabetes mellitus (H), History of blood transfusion, Malignant neoplasm (H), Thyroid disease, or Unspecified cerebral artery occlusion with cerebral infarction.     Patient Active Problem List    Diagnosis Date Noted     Delivery of pregnancy by  section 2022     Priority: Medium     Dysfunctional labor 2022     Priority: Medium     Gestational hypertension, antepartum 2022     Priority: Medium     Past surgical history:  has a past surgical history that includes no history of surgery.     Medications:    Current Outpatient Medications   Medication Sig Dispense Refill     acetaminophen (TYLENOL) 325 MG tablet Take 650 mg by mouth       ibuprofen (ADVIL/MOTRIN) 200 MG tablet Take 600 mg by mouth       oxyCODONE (ROXICODONE) 5 MG tablet Take 5-10 mg by mouth       Prenatal Vit-Fe Fumarate-FA (PRENATAL MULTIVITAMIN W/IRON) 27-0.8 MG tablet Take 1 tablet by mouth daily 90 tablet 3        Allergies:     Allergies   Allergen Reactions     Latex Rash        Family History: family history includes Depression in an other family member; No Known Problems in her father, maternal grandfather, maternal grandmother, mother, sister, and sister; Stillborn in her sister.     Social History:  reports that she quit smoking about 21 months ago. Her smoking use included cigarettes. She smoked 0.25 packs per day. She has never used smokeless tobacco. She reports previous drug use. Drugs: Marijuana and Amphetamines. She reports that she does not drink alcohol.    Review of Systems:    Denies numbness, tingling, parasthesias.   Denies headaches.   Denies fevers, chills, night sweats   Denies chest pain.   Denies shortness of breath.   Denies any  "skin problems, abrasions, rashes, irritation.      Physical Exam  GENERAL APPEARANCE: healthy, alert, no distress. Accompanied by .  SKIN: no suspicious lesions or rashes  NEURO: Normal strength and tone, mentation intact and speech normal  PSYCH:  mentation appears normal and affect normal. Not anxious.  RESPIRATORY: No increased work of breathing.    /76   Pulse 69   Ht 1.67 m (5' 5.75\")   BMI 39.68 kg/m       left HAND / WRIST EXAM:    Not examined, holding baby.      RIGHT HAND / WRIST EXAM:    Skin intact. No abnormal skin discoloration, erythema or ecchymosis.   Normal wear pattern, color and tone.  No observable or palpable masses of the fingers or palm or wrist.  No palpable triggering of fingers.   No observable or palpable cords or nodules of the fingers or palm.    There is mild 1st extensor swelling.  There is severe tenderness in the wrist at the radial styloid, 1st extensor compartment.  There is no ecchymosis.  There is no erythema of the surrounding skin.  There is no maceration of the skin.  There is no deformity in the area.  Intact extensors. No extensor lag.    Special tests wrist:    Finkelstein's test: positive.   Ulnar piano sign: negative   Ulnar foveal tenderness:  negative    1st carpometacarpal grind: negative    Intact sensation light touch median, radial, ulnar nerves of the hand  Intact sensation to the radial and ulnar digital nerves of the fingers, as well as the finger tips.  Intact epl fpl fdp edc wrist flexion/extension biceps/triceps deltoid  Brisk capillary refill to all fingers.   Palpable radial pulse, 2+.        X-rays: none today       Assessment: 26yo LHD female with acute right wrist dequervains tenosynovitis      Plan:  * for the wrist pain, consistent with tendinitis. Common with taking care of newborns.    Nonoperative treatment. Expect improvement in most cases 6-8 weeks.  * Rest. Immobilization in removable thumb spica splints.   * will refer to Hand [facility] for thumb based handsplints, as well as treatment modalities as indicated.  * Activity modification - avoid activities that aggravate symptoms.  * NSAIDS -  Discuss with primary care provider appropriate analgesics, like acetaminophen.  * Ice twice daily to three times daily.  * Tylenol as needed for pain  * Injections: discussed injections. She understands the risks and willing to proceed with injection. She is not breastfeeding.  * Return to clinic as needed.            Silvano Mathews M.D., M.S.  Dept. of Orthopaedic Surgery  [facility]    Hand / Upper Extremity Injection/Arthrocentesis: R extensor compartment 1    Date/Time: 9/7/2022 9:20 AM  Performed by: Demarcus Gutierrez PA  Authorized by: Silvano Mathews MD     Indications:  Pain  Needle Size:  22 G  Guidance: landmark    Approach:  Radial  Condition: de Quervain's      Site:  R extensor compartment 1  Medications:  20 mg triamcinolone 40 MG/ML; 1 mL bupivacaine HCl (PF) 0.25 %  Outcome:  Tolerated well, no immediate complications  Procedure discussed: discussed risks, benefits, and alternatives    Consent Given by:  Patient  Prep: patient was prepped and draped in usual sterile fashion                Again, thank you for allowing me to participate in the care of your patient.        Sincerely,        Silvano Mathews MD

## 2022-10-12 NOTE — TELEPHONE ENCOUNTER
Discharge Summary   Patient ID:   Dayami Singh   [id number]   82 year old   1940   Admit date: 10/10/2022   Discharge date: 10/12/2022   Admitting Physician: Jamee Riley DO   Discharge Physician: Anita att. providers found     Primary Diagnoses:   Principal Problem (Resolved):    Hypoglycemia  Active Problems:    * No active hospital problems. *     Secondary Diagnoses:   Past Medical History:   Diagnosis Date   • Benign hypertension    • Coronary artery disease involving native coronary artery of native heart with unstable angina pectoris (CMS/[facility]) 11/09/2012    3 vessel CAD   • Depression    • DJD (degenerative joint disease) of knee     left, with persistent pain   • DMII (diabetes mellitus, type 2) (CMS/[facility])     checks blood sugars daily   • Dyslipidemia    • Endometrial cancer (CMS/[facility]) 02/03/2020    Uterine serous cancer   • Endometrial polyp    • GERD (gastroesophageal reflux disease)    • Morbid obesity (CMS/[facility])     BMI 42      HPI:  Dayami Singh is a 82 year old female with pmh DM type II, HTN, CAD, depression, DM type II, HLD, endometrial CA and morbid obesity the [facility] ED today for evaluation of symptomatic hypoglycemia.  History is limited as the patient is a poor historian.  History is obtained from the patient, patient's  at the bedside, chart review and report from the ED provider.      reports patient has been having intermittent episodes of hypoglycemia for the past 3 days with BG levels as lows as 30's.  He has found her unresponsive twice. The first unresponsive episode occurred on Saturday 10/8 for which EMS was activated, blood glucose was stabilized in the field and patient/ opted not to present to the hospital.  The second unresponsive episode occurred on 10/9,  found her unresponsive in the bed, BG was 53 and patient was brought the emergency department.  During that visit, bactrim which she was prescribed for UTI was thought to be contributing to  Pt had a  on 22.  Post op appt on 8/10/22.  Dr. Porter wrote a letter stating pt was medically released to go back to work but was taking FMLA until .  However, pt is asking for a return to work letter to go back on .    Janel Dolan RN     hypoglycemia and changed to keflex.  She was stabilized in the ED and discharged to home.  Today,  again found her to be confused, checked BG 39, gave her glucose gel without improvement so activated EMS.      Patient reports that she hasn't been eating well over the past three days amongst intermittent unresponsive spells. She denies nausea, emesis, diarrhea or constipation.   notes that they typically sleep till around 11am, only have two meals per day.  She has continued to take metformin and glimepiride despite hypoglycemia.  She was diagnosed with a urinary tract infection 10/5, started on bactrim which was changed to keflex.     In our ED, patient was hemodynamically stable and afebrile.  Laboratory workup was revealing for sever hypoglycemia BG 47, AST 43, albumin 2.9, LA 2.5, hgb 9.9, . CXR revealed mild cardiomegaly and pulmonary vascular congestion. Xray right knee with large suprapatellar knee joint effusion.  RLE negative for DVT. EKG with Afib and PVC's.      Hospital Course By Problem List (see H&P for details of admission):     DM Type 2  Symptomatic hypoglycemia likely 2/2 Sulfonylurea use  Metabolic encephalopathy, resolved  - had hypoglycemia & unresponsiveness x 3 days PTA  - on metformin/glimepiride and lantus PTA  -BG 43 in the ED -> remained in the 40's despite dextrose & dinner  -given IV decadron 10mg x 1  -continue to hold glimpiride/metformin   - needed insulin drip overnight due to hyperglycemia after D5W  - stopped D5W  - BS improving, insulin drip stopped  - accuchecks  - SSI  - carb controlled diet  - patient discharged on Novolog sliding scale and metformin  - Endocrinology consult as outpatient placed     Recent urinary tract infection  -10/5/22 - UC grew klebsiella  -treated with course of PO bactrim -> keflex  -symptomatically improved - no fever/leukocytosis to suggest infection     Chronic RT knee effusion  Right knee pain, POA  Right knee DJD, POA  Difficulty  in ambulation, POA  - patient having difficulty walking due to pain on the right knee upon standing up  -xray of the RT knee with large suprapatellar knee joint effusion  -US RLE is negative for DVT   - PT/OT  - Ortho consulted, joint aspiration/cortisone injection done on 10/12/22, ortho follow up PRN     Afib  -s/p pacemaker placement  - had concern with pacemaker malfunction on 10/11/22  - EP consulted, pacemaker interrogation done.   - \" pacemaker is functioning normally.  Atrial undersensing due to fine atrial fibrillation.  Will reprogram to VVIR.   RA sensitivity changed to 0.15\"  -eliquis   - carvedilol  - telemetry    Chronic diastolic CHF  -echocardiogram with EF 61%, moderate-severe MVR, severe TVR  - torsemide 10mg bid  - daily weights and I/O's     HTN  -controlled; -140's  - carvedilol 12.5mg bid   - held losartan 25mg daily due to Hyperkalemia  - BP well controlled while admitted, losartan held upon DC    Hyperkalemia  - K 5.6 despite being on insulin drip  - no EKG changes  - given Lokelma  - repeat BMP  - will need repeat BMP as outpatient     CAD (stent x 2)  -stable, no CP  -ASA 81mg daily, plavix 75mg daily, atorvastatin 40mg daily  - spoke with Cardiology (Meghan Lira), recommended to DC plavix, continue aspirin and eliquis     Anxiety/depression  - duloxetine 30mg daily     HLD  - atorvastatin 40mg daily    Pancytopenia  - repeat CBC as outpatient    Consults   IP Consult Orders (From admission, onward)             Start     Ordered    10/11/22 1007  Inpatient Consult to Ortho  ONE TIME        Provider:  Edvin Dodd MD    10/11/22 1006    10/11/22 0120  Inpatient consult to Electrophysiology  ONE TIME        Comments: EP consulted around midnight   Provider:  Chandra Villarreal MD    10/11/22 0120                 Discharge Exam   Blood pressure 124/60, pulse 80, temperature 97.8 °F (36.6 °C), temperature source Oral, resp. rate 18, height 5' 7\" (1.702 m), weight 86.4 kg (190 lb  7.6 oz), SpO2 94 %.   General: A&O x3, in NAD    Lungs: clear BS   Heart: irregularly irregular     Activity: as tolerated     Diet: cardiac, carb controlled     Code Status: Full code    Pending issues to be followed up by PCP   Blood sugar monitoring, DM medication management  Repeat BMP, had Hyperkalemia while admitted  PRN follow up with Ortho for right knee pain      Discharge Medication List:       What to Do with Your Medications      START taking these medications today unless otherwise stated      Details   FreeStyle Clarissa 14 Day Sensor Misc      Apply 1 each every 14 days.  Authorizing Provider: Laura Joshi MD     FreeStyle Clarissa 2 Fielding Device      Use to test 3 times daily before meals  Authorizing Provider: Laura Joshi MD     insulin aspart 100 UNIT/ML pen-injector  Commonly known as: NovoLOG FlexPen      Prime 2 units before each dose. Inject 3 times daily.   Blood Sugar less than 150 mg/dL - Give 0 units of Correction Dose 150-199 mg/dL - GIVE 1 unit of Correction Dose 200-249 mg/dL - GIVE 2 units of Correction Dose 250-299 mg/dL - GIVE 3 units of Correction Dose 300-349 mg/dL - GIVE 4 units of Correction Dose 350 mg/dL or greater - GIVE 5 units of Correction Dose and Notify MD  Maximum Novolog per day 15 units.  Authorizing Provider: Laura Joshi MD                     CONTINUE taking these medications which have CHANGED      Details   Magnesium 400 MG Tab  What changed: when to take this      Take 400 mg by mouth 2 times daily.  Authorizing Provider: Kendall Horta MD  Comment: Dose increased 11/10/20     torsemide 20 MG tablet  Commonly known as: DEMADEX  What changed:   · how much to take  · when to take this      Take 1 tablet by mouth daily.  Authorizing Provider: BLU Gonsales     True Metrix Blood Glucose Test test strip  What changed: additional instructions      Generic drug: blood glucose  Test blood sugar 3  times daily as directed. Diagnosis: E11.9. Meter: True Metrix Meter  Authorizing Provider: Laura Joshi MD     TRUEplus Lancets 28G Misc  What changed: additional instructions      Test blood sugar 3 times daily as directed. Diagnosis: E11.9. Meter: True Metrix Meter  Authorizing Provider: Laura Joshi MD     UltiCare Mini Pen Needles 30G X 5 MM Misc  What changed: additional instructions      Generic drug: Insulin Pen Needle  Use to inject insulin 3 times daily. Remove needle cover(s) to expose needle before injecting.  Authorizing Provider: Laura Joshi MD                     CONTINUE taking these medications which have NOT CHANGED      Details   apixaBAN 5 MG Tab  Commonly known as: ELIQUIS      Take 1 tablet by mouth every 12 hours.  Authorizing Provider: BLU Bashir     aspirin 81 MG EC tablet      Take 1 tablet by mouth daily.  Authorizing Provider: Negrito Medina MD     atorvastatin 40 MG tablet  Commonly known as: LIPITOR      Take 1 tablet by mouth daily.  Authorizing Provider: Marzenna M Schoeneich, MD     carvedilol 12.5 MG tablet  Commonly known as: COREG      Take 1 tablet by mouth 2 times daily (with meals).  Authorizing Provider: BLU Bashir     cyclobenzaprine 5 MG tablet  Commonly known as: FLEXERIL      Take 1 tablet by mouth 3 times daily as needed for Muscle spasms.  Authorizing Provider: Kendall Horta MD     DULoxetine 30 MG capsule  Commonly known as: CYMBALTA      Take 1 capsule by mouth daily.  Authorizing Provider: Marzenna M Schoeneich, MD     estradiol 0.2 mg/g (0.02 %) cream (in natural base)      Apply using fingertip method 1 gram to the urethra twice weekly  Authorizing Provider: Eleonora Chavez PA-C  Comment: Compounded medication     famotidine 20 MG tablet  Commonly known as: Pepcid      Take 1 tablet by mouth as needed (indigestion).  Authorizing Provider: Ernesto MARIN  Schoeneich, MD     Fish Oil 1200 MG capsule      Take 1,200 mg by mouth 2 times daily.     metFORMIN 500 MG tablet  Commonly known as: GLUCOPHAGE      Take 2 tablets by mouth in the morning and 2 tablets in the evening.  Authorizing Provider: Marzenna M Schoeneich, MD     nitroGLYCERIN 0.4 MG sublingual tablet  Commonly known as: NITROSTAT      Place 1 tablet under the tongue every 5 minutes as needed for Chest pain.  Authorizing Provider: Jeremi Lamas PA-C     NON FORMULARY      Myke Bauer Curamin   Take 1 tablets by mouth twice daily     ondansetron 4 MG disintegrating tablet  Commonly known as: Zofran ODT      Place 1 tablet onto the tongue every 8 hours as needed for Nausea.  Authorizing Provider: Jorge Luis Cordoba MD     vitamin C 1000 MG tablet      Take 1,000 mg by mouth in the morning and 1,000 mg in the evening.  Authorizing Provider: Negrito Medina MD     vitamin D3 10 mcg (400 units) tablet      Take 1 tablet by mouth daily. Takes in Winter                     STOP taking these medications, discuss with your Pharmacist      Reason for stopping Comments   cephalexin 500 MG capsule  Commonly known as: Keflex          clopidogrel 75 MG tablet  Commonly known as: PLAVIX          glimepiride 4 MG tablet  Commonly known as: AMARYL          insulin detemir 100 UNIT/ML pen-injector  Commonly known as: LEVEMIR FLEXTOUCH          losartan 25 MG tablet  Commonly known as: COZAAR                              Where to Get Your Medications      These medications were sent to [id number] - [place], [place] - [address]  [address] [facility] [address]    Hours: M-F 7a-7p,SA 8a-4p,LANDIS 9a-4p Phone: [phone]   · FreeStyle Clarissa 14 Day Sensor Misc  · FreeStyle Clarissa 2 Garner Device  · insulin aspart 100 UNIT/ML pen-injector  · True Metrix Blood Glucose Test test strip  · TRUEplus Lancets 28G Misc  · UltiCare Mini Pen Needles 30G X 5 MM Misc         Disposition: patient is being  discharged to home with home health     Follow-up with:   Kendall Horta MD  [address]  [place] [id number] [phone]    Follow up in 3 day(s)  post hospital follow up within 3 days of being discharged. Clinic calling patient    BLU Gonsales  [address]  [address]  [place] [address] [phone]    Follow up in 7 day(s)  post hospital follow up within 7 days of discharge.Appointment scheduled 10/13/22 at 2:00pm       Time spent on discharge was greater than 30 minutes   Signed:   Laura Joshi MD   10/12/2022   5:58 PM

## 2022-11-29 ENCOUNTER — VIRTUAL VISIT (OUTPATIENT)
Dept: ENDOCRINOLOGY | Facility: CLINIC | Age: 27
End: 2022-11-29
Payer: COMMERCIAL

## 2022-11-29 VITALS — WEIGHT: 230 LBS | BODY MASS INDEX: 36.96 KG/M2 | HEIGHT: 66 IN

## 2022-11-29 DIAGNOSIS — E66.812 CLASS 2 OBESITY WITHOUT SERIOUS COMORBIDITY WITH BODY MASS INDEX (BMI) OF 37.0 TO 37.9 IN ADULT, UNSPECIFIED OBESITY TYPE: Primary | ICD-10-CM

## 2022-11-29 PROCEDURE — 99203 OFFICE O/P NEW LOW 30 MIN: CPT | Mod: 95 | Performed by: INTERNAL MEDICINE

## 2022-11-29 NOTE — PATIENT INSTRUCTIONS
- start phentermine 8 mg daily    FOLLOW-UP:   See PharmD in 4-6 weeks  See Dr.Tasma LANDERS in 3 month(s)    If you have any questions, please do not hesitate to call Weight management clinic at [phone] or [phone].  If you need to fax, please fax to [phone].    Sincerely,    Trever Myers MD  Endocrinology
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
## 2022-11-29 NOTE — LETTER
Date:November 30, 2022      Patient was self referred, no letter generated. Do not send.        [facility] Health Information
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
## 2022-11-29 NOTE — PROGRESS NOTES
Virtual Visit Check-In    During this virtual visit the patient is located in [place], patient verifies this as the location during the entirety of this visit.     Mi is a 27 year old who is being evaluated via a billable video visit.      How would you like to obtain your AVS? MyChart  If the video visit is dropped, the invitation should be resent by: Text to cell phone: [phone]  Will anyone else be joining your video visit? No      Originating Location (pt. Location): Home        Distant Location (provider location):  Off-site    Platform used for Video Visit: Yolanda Gonzalez NREMT

## 2022-11-29 NOTE — NURSING NOTE
"Chief Complaint   Patient presents with     Consult     New consultation for weight management.         Vitals:    11/29/22 1517   Weight: 230 lb   Height: 5' 6\"       Body mass index is 37.12 kg/m .      Hermila Gonzalez, EMT  [facility]                      "

## 2022-12-01 ENCOUNTER — TELEPHONE (OUTPATIENT)
Dept: ENDOCRINOLOGY | Facility: CLINIC | Age: 27
End: 2022-12-01

## 2022-12-01 ENCOUNTER — OFFICE VISIT (OUTPATIENT)
Dept: FAMILY MEDICINE | Facility: CLINIC | Age: 27
End: 2022-12-01
Payer: COMMERCIAL

## 2022-12-01 VITALS
RESPIRATION RATE: 16 BRPM | SYSTOLIC BLOOD PRESSURE: 102 MMHG | DIASTOLIC BLOOD PRESSURE: 74 MMHG | OXYGEN SATURATION: 97 % | BODY MASS INDEX: 37.41 KG/M2 | HEART RATE: 81 BPM | HEIGHT: 66 IN | TEMPERATURE: 98.8 F

## 2022-12-01 DIAGNOSIS — H65.92 OME (OTITIS MEDIA WITH EFFUSION), LEFT: Primary | ICD-10-CM

## 2022-12-01 PROCEDURE — 99213 OFFICE O/P EST LOW 20 MIN: CPT | Performed by: NURSE PRACTITIONER

## 2022-12-01 RX ORDER — AMOXICILLIN 500 MG/1
1000 CAPSULE ORAL 3 TIMES DAILY
Qty: 60 CAPSULE | Refills: 0 | Status: SHIPPED | OUTPATIENT
Start: 2022-12-01 | End: 2022-12-11

## 2022-12-01 ASSESSMENT — ENCOUNTER SYMPTOMS
DIAPHORESIS: 0
LIGHT-HEADEDNESS: 0
COUGH: 0
RHINORRHEA: 0
WHEEZING: 0
EYE ITCHING: 0
SHORTNESS OF BREATH: 0
SINUS PRESSURE: 0
DIZZINESS: 0
FEVER: 0
SORE THROAT: 0
HEADACHES: 0
DIARRHEA: 0
CHILLS: 0
CONSTIPATION: 0
EYE DISCHARGE: 0
FATIGUE: 0
NAUSEA: 0

## 2022-12-01 ASSESSMENT — PAIN SCALES - GENERAL: PAINLEVEL: MILD PAIN (2)

## 2022-12-01 NOTE — PROGRESS NOTES
"  Assessment & Plan     OME (otitis media with effusion), left  Reviewed treatment plan and roll of antibiotics  Reviewed fever and pain control with tylenol and/or ibuprofen  Keep the ear clean and dry  Instructed to call or return for   Symptoms not improved in 2 days  Worsening fever or ear pain  New or unexplained symptoms   - amoxicillin (AMOXIL) 500 MG capsule; Take 2 capsules (1,000 mg) by mouth 3 times daily for 10 days    Prescription drug management  15 minutes spent on the date of the encounter doing chart review, history and exam, documentation and further activities per the note     BMI:   Estimated body mass index is 37.41 kg/m  as calculated from the following:    Height as of this encounter: 1.67 m (5' 5.75\").    Weight as of 11/29/22: 104.3 kg (230 lb).         Return in about 10 days (around 12/11/2022), or if symptoms worsen or fail to improve.    BABITA Escamilla CNP  [facility] RAOY Stark is a 27 year old, presenting for the following health issues:  Ear Problem      History of Present Illness       Reason for visit:  Ear ache  Symptom onset:  1-3 days ago  Symptoms include:  Ear  Symptom intensity:  Moderate  Symptom progression:  Worsening  Had these symptoms before:  No  What makes it worse:  Bending head down  What makes it better:  Ibuprophen    She eats 4 or more servings of fruits and vegetables daily.She consumes 0 sweetened beverage(s) daily.She exercises with enough effort to increase her heart rate 30 to 60 minutes per day.  She exercises with enough effort to increase her heart rate 6 days per week.   She is taking medications regularly.     *left ear pain and ringing x1 week. Afebrile. Had cough/cold last week. Son had RSV recently and double ear infection 1 week ago.    Ally DeShong CMA      Left ear pain. Cold last week. Cold Got better 3-4 days ago but still has ear pain. Popped and now feels like has fluid in ear. Does feel like hearing " "is less. No dizziness or lighthead. Bent over and felt things shift inside ear.     Review of Systems   Constitutional: Negative for chills, diaphoresis, fatigue and fever.   HENT: Positive for ear pain (left ear pain). Negative for ear discharge, hearing loss, rhinorrhea, sinus pressure and sore throat.    Eyes: Negative for discharge and itching.   Respiratory: Negative for cough, shortness of breath and wheezing.    Gastrointestinal: Negative for constipation, diarrhea and nausea.   Skin: Negative for rash.   Neurological: Negative for dizziness, light-headedness and headaches.          Objective    /74   Pulse 81   Temp 98.8  F (37.1  C) (Tympanic)   Resp 16   Ht 1.67 m (5' 5.75\")   SpO2 97%   BMI 37.41 kg/m    Body mass index is 37.41 kg/m .  Physical Exam  Constitutional:       Appearance: She is well-developed.   HENT:      Right Ear: Tympanic membrane and external ear normal. No middle ear effusion. Tympanic membrane is not erythematous.      Left Ear: External ear normal. A middle ear effusion is present. Tympanic membrane is erythematous.      Nose: No mucosal edema or rhinorrhea.   Cardiovascular:      Rate and Rhythm: Normal rate and regular rhythm.      Heart sounds: Normal heart sounds.   Pulmonary:      Effort: Pulmonary effort is normal.      Breath sounds: Normal breath sounds.   Abdominal:      General: Bowel sounds are normal.      Palpations: Abdomen is soft.   Skin:     General: Skin is warm and dry.   Neurological:      Mental Status: She is alert.   Psychiatric:         Mood and Affect: Mood and affect normal.                  "

## 2022-12-01 NOTE — TELEPHONE ENCOUNTER
"Prior Authorization Retail Medication Request    Medication/Dose: Lomaira    ICD code (if different than what is on RX):      Previously Tried and Failed: Watching Portions or Calories, Exercise, Slim Fast or Other Liquid Diets, Fasting     Rationale: Polycystic Ovarian Syndrome    Weight loss medication(s) are indicated due to patient having a BMI of at least 30 kg/m2     Estimated body mass index is 37.41 kg/m  as calculated from the following:    Height as of an earlier encounter on 12/1/22: 1.67 m (5' 5.75\").    Weight as of 11/29/22: 104.3 kg (230 lb).    Initial Weight: 230 lbs 0 oz    Insurance Name:    Insurance ID:        Pharmacy Information (if different than what is on RX)  Name:  [id number] [address] AT [facility]  Phone:  [phone]    "

## 2022-12-01 NOTE — TELEPHONE ENCOUNTER
Central Prior Authorization Team   Phone: [phone]      PA Initiation    Medication: Lomaira 8 mg-PA initiated  Insurance Company: [email or web address] - Phone [phone] Fax [phone]  Pharmacy Filling the Rx: [id number] [address] AT [facility]  Filling Pharmacy Phone: [phone]  Filling Pharmacy Fax:    Start Date: 12/1/2022

## 2022-12-02 NOTE — TELEPHONE ENCOUNTER
PRIOR AUTHORIZATION DENIED    Medication: Lomaira 8 mg-PA denied    Denial Date: 12/1/2022    Denial Rational: Must try/fail phentermine        Appeal Information:

## 2022-12-04 ENCOUNTER — OFFICE VISIT (OUTPATIENT)
Dept: FAMILY MEDICINE | Facility: CLINIC | Age: 27
End: 2022-12-04
Payer: COMMERCIAL

## 2022-12-04 VITALS
OXYGEN SATURATION: 97 % | SYSTOLIC BLOOD PRESSURE: 113 MMHG | TEMPERATURE: 98.3 F | DIASTOLIC BLOOD PRESSURE: 74 MMHG | HEART RATE: 67 BPM | RESPIRATION RATE: 18 BRPM

## 2022-12-04 DIAGNOSIS — B00.9 HERPETIC LESION: Primary | ICD-10-CM

## 2022-12-04 DIAGNOSIS — A60.00 GENITAL HERPES SIMPLEX, UNSPECIFIED SITE: ICD-10-CM

## 2022-12-04 PROCEDURE — 99214 OFFICE O/P EST MOD 30 MIN: CPT | Performed by: FAMILY MEDICINE

## 2022-12-04 RX ORDER — LIDOCAINE HYDROCHLORIDE 20 MG/ML
JELLY TOPICAL 3 TIMES DAILY PRN
Qty: 30 ML | Refills: 0 | Status: SHIPPED | OUTPATIENT
Start: 2022-12-04 | End: 2023-05-18

## 2022-12-04 RX ORDER — VALACYCLOVIR HYDROCHLORIDE 1 G/1
1000 TABLET, FILM COATED ORAL 2 TIMES DAILY
Qty: 20 TABLET | Refills: 0 | Status: SHIPPED | OUTPATIENT
Start: 2022-12-04 | End: 2023-11-07

## 2022-12-04 NOTE — PROGRESS NOTES
ASSESSMENT/PLAN:      ICD-10-CM    1. Herpetic lesion  B00.9 valACYclovir (VALTREX) 1000 mg tablet     lidocaine (XYLOCAINE) 2 % external gel    left  buttock       2. Genital herpes simplex, unspecified site  A60.00     patient with hx of genital herpes, no active flare at present in vaginal area, positive serology 2015                 Reviewed medication instructions and side effects. Follow up if experiences side effects.     I reviewed supportive care, otc meds to use if needed, expected course, and signs of concern.  Follow up as needed or if she does not improve within  1-2 days or if worsens in any way.  Reviewed red flag symptoms and is to go to the ER if experiences any of these.     The use of Dragon/PowerMic dictation services may have been used to construct the content in this note; any grammatical or spelling errors are non-intentional. Please contact the author of this note directly if you are in need of any clarification.      On the day of the encounter, time spend on chart review, patient visit, review of testing, documentation was 30  minutes          Patient Instructions     Follow up as needed or if your symptoms worsen in any way.     Follow up with your primary care provider or clinic in about 2-3 days if your symptoms do not improve     Terrie Hernandez MD               Patient presents with:  Rash: On butt x 3 days, staying the same sometimes itches, sometimes hurts to sit on, has used cortisone cream and nystatin. It was around the time started to take amoxicillin but could have been there before       Subjective     Mi Salgado is a 27 year old female who presents to clinic today for the following health issues:    HPI       Rash      Duration: noted on 12/2/2022     Description  Location:left  Buttock   Itching: initially, no longer itching, now more tender   Per photo by patient-started as a red circular area mid buttocks with raised areas in central portion of circular area  slight darker in color now red circular area covered in  white/grayfluid filled clusters of vesicles     Intensity:  moderate    Accompanying signs and symptoms: area tender, burning pain    Patient with history of genital herpes, similar pain     No fever,     History (similar episodes/previous evaluation): None-not on buttocks, hx of genital herpes     Precipitating or alleviating factors:  New exposures:  None  Recent travel: no      Therapies tried and outcome: hydrocortisone cream -  not effective and nystatin-not effective         Past Medical History:   Diagnosis Date     ADHD (attention deficit hyperactivity disorder) 2012    resolved     Anxiety 2017    improved     Chlamydia     confidential     Chronic post-traumatic stress disorder (PTSD) 2018     Cluster B personality disorder (H) 2011    Per psych discharge summary dated 11      Genital HSV     positive serology; confidential     Gestational hypertension, antepartum 2022     Gonorrhea     confidential     Oppositional defiant disorder of childhood or adolescence     resolved     PCOS (polycystic ovarian syndrome) 2017     Social History     Tobacco Use     Smoking status: Former     Packs/day: 0.25     Types: Cigarettes     Quit date: 2020     Years since quittin.0     Smokeless tobacco: Never   Substance Use Topics     Alcohol use: No     Alcohol/week: 0.0 standard drinks       Current Outpatient Medications   Medication Sig Dispense Refill     amoxicillin (AMOXIL) 500 MG capsule Take 2 capsules (1,000 mg) by mouth 3 times daily for 10 days 60 capsule 0     lidocaine (XYLOCAINE) 2 % external gel Apply topically 3 times daily as needed for moderate pain (4-6) 30 mL 0     valACYclovir (VALTREX) 1000 mg tablet Take 1 tablet (1,000 mg) by mouth 2 times daily for 10 days 20 tablet 0     phentermine (LOMAIRA) 8 MG tablet Take 1 tablet (8 mg) by mouth every morning (before breakfast) (Patient not  taking: Reported on 12/1/2022) 30 tablet 2     Allergies   Allergen Reactions     Latex Rash             ROS are negative, except as otherwise noted HPI      Objective    /74   Pulse 67   Temp 98.3  F (36.8  C) (Tympanic)   Resp 18   LMP 11/04/2022   SpO2 97%   There is no height or weight on file to calculate BMI.  Physical Exam   GENERAL: healthy, alert and no distress  MS: no gross musculoskeletal defects noted, no edema  SKIN: mid left buttock 3 cm in size in diameter circular lesion covered in a cluster of small grey/white fluid filled vesicles, no fluctuance, no streaking, no other lesions   NEURO: Normal strength and tone, mentation intact and speech normal       Diagnostic Test Results:  Labs reviewed in Epic  No results found for any visits on 12/04/22.

## 2022-12-04 NOTE — PATIENT INSTRUCTIONS
Follow up as needed or if your symptoms worsen in any way.     Follow up with your primary care provider or clinic in about 2-3 days if your symptoms do not improve     Terrie Hernandez MD

## 2023-01-13 ENCOUNTER — TELEPHONE (OUTPATIENT)
Dept: ENDOCRINOLOGY | Facility: CLINIC | Age: 28
End: 2023-01-13

## 2023-01-13 NOTE — TELEPHONE ENCOUNTER
MTM appointment no showed, we made one more attempt to reschedule.     Routing back to referring provider and MTM pharmacist.     Carmen Quesada MTM     Patient has [facility] coverage     Topical Clindamycin Counseling: Patient counseled that this medication may cause skin irritation or allergic reactions.  In the event of skin irritation, the patient was advised to reduce the amount of the drug applied or use it less frequently.   The patient verbalized understanding of the proper use and possible adverse effects of clindamycin.  All of the patient's questions and concerns were addressed.

## 2023-03-20 ENCOUNTER — TELEPHONE (OUTPATIENT)
Dept: OBGYN | Facility: CLINIC | Age: 28
End: 2023-03-20
Payer: COMMERCIAL

## 2023-03-20 DIAGNOSIS — Z32.01 PREGNANCY TEST POSITIVE: Primary | ICD-10-CM

## 2023-04-05 ENCOUNTER — TELEPHONE (OUTPATIENT)
Dept: OBGYN | Facility: CLINIC | Age: 28
End: 2023-04-05
Payer: COMMERCIAL

## 2023-04-05 ENCOUNTER — TELEPHONE (OUTPATIENT)
Dept: MIDWIFE SERVICES | Facility: CLINIC | Age: 28
End: 2023-04-05
Payer: COMMERCIAL

## 2023-04-05 DIAGNOSIS — O21.9 NAUSEA AND VOMITING IN PREGNANCY: Primary | ICD-10-CM

## 2023-04-05 RX ORDER — ONDANSETRON 4 MG/1
4 TABLET, ORALLY DISINTEGRATING ORAL EVERY 8 HOURS PRN
Qty: 30 TABLET | Refills: 1 | Status: SHIPPED | OUTPATIENT
Start: 2023-04-05 | End: 2023-06-10

## 2023-04-05 NOTE — TELEPHONE ENCOUNTER
Patient called and stated that she has her OBI and US this Friday 4-21-23, but is really nauseous.    Patient stated she has tried the OTC remedies, but they have not helped and states she cannot wait until Friday to get something else.    I let her know that I will pend Zofran off to the midwives for approval.    Patient verbalized understanding.    Medication pended

## 2023-04-05 NOTE — TELEPHONE ENCOUNTER
Pt calling to see if her medication was sent to the pharmacy.    Kesha Montez RN  [facility]

## 2023-04-18 PROBLEM — O09.91 SUPERVISION OF HIGH RISK PREGNANCY IN FIRST TRIMESTER: Status: ACTIVE | Noted: 2023-04-18

## 2023-04-18 PROBLEM — Z87.59 HISTORY OF GESTATIONAL HYPERTENSION: Status: ACTIVE | Noted: 2022-06-30

## 2023-04-18 PROBLEM — Z98.891 HISTORY OF CESAREAN DELIVERY: Status: ACTIVE | Noted: 2022-06-30
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
## 2023-04-20 ENCOUNTER — ANCILLARY PROCEDURE (OUTPATIENT)
Dept: ULTRASOUND IMAGING | Facility: CLINIC | Age: 28
End: 2023-04-20
Attending: ADVANCED PRACTICE MIDWIFE
Payer: COMMERCIAL

## 2023-04-20 DIAGNOSIS — Z32.01 PREGNANCY TEST POSITIVE: ICD-10-CM

## 2023-04-20 LAB
ABO/RH(D): NORMAL
ANTIBODY SCREEN: NEGATIVE
SPECIMEN EXPIRATION DATE: NORMAL

## 2023-04-20 PROCEDURE — 76801 OB US < 14 WKS SINGLE FETUS: CPT | Mod: 26 | Performed by: OBSTETRICS & GYNECOLOGY

## 2023-04-20 PROCEDURE — 76801 OB US < 14 WKS SINGLE FETUS: CPT

## 2023-04-21 ENCOUNTER — TRANSCRIBE ORDERS (OUTPATIENT)
Dept: MATERNAL FETAL MEDICINE | Facility: CLINIC | Age: 28
End: 2023-04-21

## 2023-04-21 ENCOUNTER — OFFICE VISIT (OUTPATIENT)
Dept: OBGYN | Facility: CLINIC | Age: 28
End: 2023-04-21
Attending: ADVANCED PRACTICE MIDWIFE
Payer: COMMERCIAL

## 2023-04-21 ENCOUNTER — APPOINTMENT (OUTPATIENT)
Dept: LAB | Facility: CLINIC | Age: 28
End: 2023-04-21
Attending: ADVANCED PRACTICE MIDWIFE
Payer: COMMERCIAL

## 2023-04-21 VITALS
DIASTOLIC BLOOD PRESSURE: 70 MMHG | SYSTOLIC BLOOD PRESSURE: 107 MMHG | HEART RATE: 65 BPM | WEIGHT: 216.5 LBS | TEMPERATURE: 98.2 F | BODY MASS INDEX: 34.79 KG/M2 | HEIGHT: 66 IN

## 2023-04-21 DIAGNOSIS — Z87.59 HISTORY OF GESTATIONAL HYPERTENSION: ICD-10-CM

## 2023-04-21 DIAGNOSIS — F32.A DEPRESSIVE DISORDER: ICD-10-CM

## 2023-04-21 DIAGNOSIS — O34.219 DESIRES VBAC (VAGINAL BIRTH AFTER CESAREAN) TRIAL: ICD-10-CM

## 2023-04-21 DIAGNOSIS — O13.9 GESTATIONAL HYPERTENSION, ANTEPARTUM: ICD-10-CM

## 2023-04-21 DIAGNOSIS — Z86.59 HISTORY OF ANXIETY: ICD-10-CM

## 2023-04-21 DIAGNOSIS — Z86.19 HISTORY OF HERPES GENITALIS: ICD-10-CM

## 2023-04-21 DIAGNOSIS — O09.91 SUPERVISION OF HIGH RISK PREGNANCY IN FIRST TRIMESTER: ICD-10-CM

## 2023-04-21 DIAGNOSIS — Z62.819 HISTORY OF ABUSE IN CHILDHOOD: ICD-10-CM

## 2023-04-21 DIAGNOSIS — E78.5 HYPERLIPIDEMIA WITH TARGET LDL LESS THAN 160: Primary | ICD-10-CM

## 2023-04-21 DIAGNOSIS — O09.891 SHORT INTERVAL BETWEEN PREGNANCIES AFFECTING PREGNANCY IN FIRST TRIMESTER, ANTEPARTUM: ICD-10-CM

## 2023-04-21 DIAGNOSIS — O26.90 PREGNANCY RELATED CONDITION, ANTEPARTUM: Primary | ICD-10-CM

## 2023-04-21 DIAGNOSIS — Z98.891 HISTORY OF CESAREAN DELIVERY: ICD-10-CM

## 2023-04-21 PROBLEM — E66.9 OBESITY: Status: ACTIVE | Noted: 2023-04-18

## 2023-04-21 PROBLEM — F12.10 TETRAHYDROCANNABINOL (THC) USE DISORDER, MILD, ABUSE: Status: ACTIVE | Noted: 2023-04-18

## 2023-04-21 LAB
ALBUMIN MFR UR ELPH: 9.5 MG/DL (ref 1–14)
ALT SERPL W P-5'-P-CCNC: 17 U/L (ref 10–35)
AST SERPL W P-5'-P-CCNC: 18 U/L (ref 10–35)
CREAT SERPL-MCNC: 0.56 MG/DL (ref 0.51–0.95)
CREAT UR-MCNC: 126.5 MG/DL
DEPRECATED CALCIDIOL+CALCIFEROL SERPL-MC: 44 UG/L (ref 20–75)
ERYTHROCYTE [DISTWIDTH] IN BLOOD BY AUTOMATED COUNT: 12.5 % (ref 10–15)
GFR SERPL CREATININE-BSD FRML MDRD: >90 ML/MIN/1.73M2
HBA1C MFR BLD: 5.1 %
HBV SURFACE AB SERPL IA-ACNC: 22.94 M[IU]/ML
HBV SURFACE AB SERPL IA-ACNC: REACTIVE M[IU]/ML
HBV SURFACE AG SERPL QL IA: NONREACTIVE
HCT VFR BLD AUTO: 40.1 % (ref 35–47)
HCV AB SERPL QL IA: NONREACTIVE
HGB BLD-MCNC: 13.7 G/DL (ref 11.7–15.7)
HIV 1+2 AB+HIV1 P24 AG SERPL QL IA: NONREACTIVE
MCH RBC QN AUTO: 29.4 PG (ref 26.5–33)
MCHC RBC AUTO-ENTMCNC: 34.2 G/DL (ref 31.5–36.5)
MCV RBC AUTO: 86 FL (ref 78–100)
PLATELET # BLD AUTO: 242 10E3/UL (ref 150–450)
PROT/CREAT 24H UR: 0.08 MG/MG CR (ref 0–0.2)
RBC # BLD AUTO: 4.66 10E6/UL (ref 3.8–5.2)
RUBV IGG SERPL QL IA: 1.19 INDEX
RUBV IGG SERPL QL IA: POSITIVE
T PALLIDUM AB SER QL: NONREACTIVE
VZV IGG SER QL IA: 672.6 INDEX
VZV IGG SER QL IA: POSITIVE
WBC # BLD AUTO: 9.5 10E3/UL (ref 4–11)

## 2023-04-21 PROCEDURE — 86780 TREPONEMA PALLIDUM: CPT | Performed by: ADVANCED PRACTICE MIDWIFE

## 2023-04-21 PROCEDURE — 82565 ASSAY OF CREATININE: CPT | Performed by: ADVANCED PRACTICE MIDWIFE

## 2023-04-21 PROCEDURE — 36415 COLL VENOUS BLD VENIPUNCTURE: CPT | Performed by: ADVANCED PRACTICE MIDWIFE

## 2023-04-21 PROCEDURE — 82306 VITAMIN D 25 HYDROXY: CPT | Performed by: ADVANCED PRACTICE MIDWIFE

## 2023-04-21 PROCEDURE — 86762 RUBELLA ANTIBODY: CPT | Performed by: ADVANCED PRACTICE MIDWIFE

## 2023-04-21 PROCEDURE — 83036 HEMOGLOBIN GLYCOSYLATED A1C: CPT | Performed by: ADVANCED PRACTICE MIDWIFE

## 2023-04-21 PROCEDURE — 86787 VARICELLA-ZOSTER ANTIBODY: CPT | Performed by: ADVANCED PRACTICE MIDWIFE

## 2023-04-21 PROCEDURE — G0463 HOSPITAL OUTPT CLINIC VISIT: HCPCS | Mod: 25 | Performed by: ADVANCED PRACTICE MIDWIFE

## 2023-04-21 PROCEDURE — 87340 HEPATITIS B SURFACE AG IA: CPT | Performed by: ADVANCED PRACTICE MIDWIFE

## 2023-04-21 PROCEDURE — 87086 URINE CULTURE/COLONY COUNT: CPT | Performed by: ADVANCED PRACTICE MIDWIFE

## 2023-04-21 PROCEDURE — 84450 TRANSFERASE (AST) (SGOT): CPT | Performed by: ADVANCED PRACTICE MIDWIFE

## 2023-04-21 PROCEDURE — 85027 COMPLETE CBC AUTOMATED: CPT | Performed by: ADVANCED PRACTICE MIDWIFE

## 2023-04-21 PROCEDURE — 86901 BLOOD TYPING SEROLOGIC RH(D): CPT | Performed by: ADVANCED PRACTICE MIDWIFE

## 2023-04-21 PROCEDURE — 99207 PR PRENATAL VISIT: CPT | Performed by: ADVANCED PRACTICE MIDWIFE

## 2023-04-21 PROCEDURE — 84156 ASSAY OF PROTEIN URINE: CPT | Performed by: ADVANCED PRACTICE MIDWIFE

## 2023-04-21 PROCEDURE — 87389 HIV-1 AG W/HIV-1&-2 AB AG IA: CPT | Performed by: ADVANCED PRACTICE MIDWIFE

## 2023-04-21 PROCEDURE — 86803 HEPATITIS C AB TEST: CPT | Performed by: ADVANCED PRACTICE MIDWIFE

## 2023-04-21 PROCEDURE — 86706 HEP B SURFACE ANTIBODY: CPT | Performed by: ADVANCED PRACTICE MIDWIFE

## 2023-04-21 PROCEDURE — 84460 ALANINE AMINO (ALT) (SGPT): CPT | Performed by: ADVANCED PRACTICE MIDWIFE

## 2023-04-21 RX ORDER — PRENATAL VIT,CAL 76/IRON/FOLIC 29 MG-1 MG
TABLET ORAL
COMMUNITY
Start: 2023-04-07 | End: 2023-05-12

## 2023-04-21 RX ORDER — FOLIC ACID/MULTIVIT,IRON,MINER 0.4MG-18MG
200 TABLET ORAL DAILY
Qty: 90 CAPSULE | Refills: 3 | Status: SHIPPED | OUTPATIENT
Start: 2023-04-21 | End: 2023-05-12

## 2023-04-21 RX ORDER — ASPIRIN 81 MG
100 TABLET, DELAYED RELEASE (ENTERIC COATED) ORAL DAILY
Qty: 60 TABLET | Refills: 1 | Status: SHIPPED | OUTPATIENT
Start: 2023-04-21 | End: 2023-09-13
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
## 2023-04-21 NOTE — PROGRESS NOTES
[facility] RN Prenatal Intake note  Subjective     27 year old female newly pregnant.  LMP: 2/10/23.    exact  Pregnancy is unplanned but welcomed.    Partner/support person name and relationship - Briseno, partner.        Symptoms since LMP include cramping, nausea and bowel changes. Patient has tried these relief   measures: increased fluids.    OB HISTORY  # 1 - Date: 22, Sex: Male, Weight: 3.81 kg (8 lb 6.4 oz), GA: 39w3d, Delivery: , Unspecified, Apgar1: 8, Apgar5: 9, Living: Living, Birth Comments: None    # 2 - Date: None, Sex: None, Weight: None, GA: None, Delivery: None, Apgar1: None, Apgar5: None, Living: None, Birth Comments: None      OB COMPLICATIONS  HTN,  mood disorder and        PERSONAL/SOCIAL HISTORY  partnered  lives with their family.  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  Her partner works in a factory.   MENTAL HEALTH HISTORY:  past  mood disorder, anxiety, depression, past therapist and other mental health dx ADD      - Current Medications reviewed   Current Outpatient Medications   Medication Sig Dispense Refill     ondansetron (ZOFRAN ODT) 4 MG ODT tab Take 1 tablet (4 mg) by mouth every 8 hours as needed for nausea (Patient not taking: Reported on 2023) 30 tablet 1     Prenatal Vit-Iron Carbonyl-FA (PRENATABS RX) 29-1 MG TABS        lidocaine (XYLOCAINE) 2 % external gel Apply topically 3 times daily as needed for moderate pain (4-6) (Patient not taking: Reported on 2023) 30 mL 0     nicotine (NICODERM CQ) 7 MG/24HR 24 hr patch  (Patient not taking: Reported on 2023)       phentermine (LOMAIRA) 8 MG tablet Take 1 tablet (8 mg) by mouth every morning (before breakfast) (Patient not taking: Reported on 2022) 30 tablet 2     valACYclovir (VALTREX) 1000 mg tablet Take 1 tablet (1,000 mg) by mouth 2 times daily for 10 days 20 tablet 0           - Co-morbids reviewed   Past Medical History:    Diagnosis Date     ADHD (attention deficit hyperactivity disorder) 09/20/2012    resolved     Anxiety 12/18/2017    improved     Carrier of group B Streptococcus      Chlamydia 2013    confidential     Chronic post-traumatic stress disorder (PTSD) 09/13/2018     Cluster B personality disorder (H) 12/29/2011    Per psych discharge summary dated 12/19/11      Depressive disorder      Genital HSV 2015    positive serology; confidential     Gestational hypertension, antepartum 06/30/2022     Gonorrhea 2013    confidential     Oppositional defiant disorder of childhood or adolescence     resolved     PCOS (polycystic ovarian syndrome) 09/27/2017     Postpartum depression        - Genetic/Infection questionnaire completed, risks include pt's sister has hx of stillborn. Discussed genetic screening options, patient does desire first trimester genetic screening  Pt  does not have a recent known exposure to Parvo or CMV so IgG/IgM testing WILL NOT be ordered.   Flu Vaccine.  Patient declined, do not offer  COVID Vaccine: declines COVID vaccines  - Discussed expectations for routine prenatal care and scheduling.  -Discussed highlights from The Expectant Family booklet on warning signs, safe pregnancy and prevention of infections diseases, nutrition and exercise.  - Patient was encouraged to start prenatal vitamins as tolerated, if experiencing nausea and vomiting then OK to switch to folic acid only at this time.    -Additional items: None - has WIC  -Reconciled and reviewed problem list  -Pt declines PHQ-9/RACHEAL-7. Requesting recommendations for therapy. Currently taking raw prenatal, requesting order for new prenatal and stool softener. Reviewed medical, surgical, OB history. Report to JOSIAH Choi.    Evelyn Mtz RN

## 2023-04-21 NOTE — PATIENT INSTRUCTIONS
Thank you for trusting us with your care!     The Myth of Normal  Trauma, Illness, and Healing in a Toxic Culture    Here is the website you can use to look up different natural ingredients. These are often not researched in pregnant people.   Welcome to the Natural Medicines Research Collaboration (Sonavation)     Here are some mental health therapist referrals that have been recommended to me:    [facility]  [address]  [place] [address]  Tel: [phone]  [email or web address]    [facility]  (I like Josue Cruz)  [address]  [place] [address]  [phone]    [address], [place] [address]  [facility]  [phone]    [facility]:  Multiple locations: [facility]  ([facility])  [address] [place] [address], [address]  [place], [place] [address] [phone]  [email or web address]    [facility]   [address]  [address]  [place] [address]  [phone]  [email or web address]    [facility]  [address] [place] [address] ([facility])  Phone: [phone]  [email or web address]    [facility]    [address] [place] [address]  [phone]  [email or web address]    Mishel Hanna PhD [address] [place] [address]  Phone: [phone]    [facility]:   [address] [place] [address]  [phone]         If you need to contact us for questions about:  Symptoms, Scheduling & Medical Questions; Non-urgent (2-3 day response) [place] Online message, Urgent (needing response today) [phone] (if after 3:30pm next day response)   Prescriptions: Please call your Pharmacy   Billing: [place] [phone] or M  "Physicians:[phone]      Pregnancy: Your First Trimester Changes  The first trimester is a time of rapid development for your baby. Because your baby is growing so quickly, it is important that you start a healthy lifestyle right away. By the end of the first trimester, your baby has formed all of its major body organs and weighs just over an ounce.  Month 1 (weeks 1 to 4)  The placenta (the organ that nourishes your baby) begins to form. The brain, spinal cord, heart, gastrointestinal tract, and lungs begin to develop. Your baby is about   inch long by the end of the first month.     Actual size of baby is 1/4\".     Month 2 (weeks 5 to 8)  All of your baby s major body organs form. The face, fingers, toes, ears, and eyes appear. By the end of the month, your baby is about 1 inch long.     Actual size of baby is 1\".     Month 3 (weeks 9 to 12)  Your baby can open and close its fists and mouth. The sexual organs begin to form. As the first trimester ends, your baby is about 3 inches long.     Actual size of baby is 3\".     [employer] last reviewed this educational content on 8/1/2020 2000-2022 The [employer]Well [employer]. All rights reserved. This information is not intended as a substitute for professional medical care. Always follow your healthcare professional's instructions.          Vitamin B6 (pyridoxine) Oral Tablet  Uses  This medicine is used for the following purposes:  metabolism disorder  nausea and vomiting  vitamin deficiency  Instructions  This medicine may be taken with or without food.  Store at room temperature away from heat, light, and moisture. Do not keep in the bathroom.  If you forget to take a dose on time, take it as soon as you remember. If it is almost time for the next dose, do not take the missed dose. Return to your normal dosing schedule. Do not take 2 doses of this medicine at one time.  Drug interactions can change how medicines work or increase risk for side effects. Tell your " health care providers about all medicines taken. Include prescription and over-the-counter medicines, vitamins, and herbal medicines. Speak with your doctor or pharmacist before starting or stopping any medicine.  Speak with your doctor or pharmacist before starting any other vitamins.  It is very important that you follow your doctor's instructions for all blood tests.  Cautions  Tell your doctor and pharmacist if you ever had an allergic reaction to a medicine.  Do not use the medication any more than instructed.  Tell the doctor or pharmacist if you are pregnant, planning to be pregnant, or breastfeeding.  Side Effects  If you have any of the following side effects, you may be getting too much medicine. Please contact your doctor to let them know about these side effects.  drowsiness or sedation  numbness or tingling in hands and feet  headaches  nausea  stomach upset or abdominal pain  This medicine usually has no side effects.  A few people may have an allergic reaction to this medicine. Symptoms can include difficulty breathing, skin rash, itching, swelling, or severe dizziness. If you notice any of these symptoms, seek medical help quickly.  Extra  Please speak with your doctor, nurse, or pharmacist if you have any questions about this medicine.  [email or web address]  IMPORTANT NOTE: This document tells you briefly how to take your medicine, but it does not tell you all there is to know about it. Your doctor or pharmacist may give you other documents about your medicine. Please talk to them if you have any questions. Always follow their advice. There is a more complete description of this medicine available in English. Scan this code on your smartphone or tablet or use the web address below. You can also ask your pharmacist for a printout. If you have any questions, please ask your pharmacist. The display and use of this drug information is subject to Terms of Use. Copyright(c) 2022  [employer]     3709-7775 The StayWell [employer], LLC. All rights reserved. This information is not intended as a substitute for professional medical care. Always follow your healthcare professional's instructions.          Adapting to Pregnancy: First Trimester  As your body adjusts during your first trimester of pregnancy, you may have to change or limit your daily activities. You ll need more rest. You may also need to use the energy you have more wisely.   Your changing body  Almost every part of your body is affected as you adapt to pregnancy. The uterus and cervix will start to soften right away. You may not look very pregnant during the first 3 months. But you are likely to have some common signs of early pregnancy:   Nausea  Fatigue  Frequent urination  Mood swings  Bloating of the belly  Constipation  Heartburn  Missed or light periods (first trimester bleeding)  Nipple or breast tenderness and breast swelling  It s not too late to start good habits   What matters most is protecting your baby from this moment on. If you smoke, drink alcohol, or use drugs, now is the time to stop. If you need help, talk with your healthcare provider:   Smoking increases the risk of stillbirth or having a low-birth-weight baby. If you smoke, quit now.  Alcohol and drugs have been linked with miscarriage, birth defects, intellectual disability, and low birth weight. Don't drink alcohol or take drugs.  Tips to relieve nausea  During pregnancy, nausea can happen at any time of the day, but it may be worse in the morning. To help prevent nausea:   Eat small, light meals at frequent intervals.  Drink fluids often.  Get up slowly. Eat a few unsalted crackers before you get out of bed.  Avoid smells that bother you.  Avoid spicy and fatty foods.  Eat an ice pop in your favorite flavor.  Get plenty of rest.  Ask your healthcare provider about taking emperatriz or vitamin B6 for nausea and vomiting.  Talk with your healthcare  provider if you take vitamins that upset your stomach.   Work concerns  The end of the first trimester is a good time to discuss working during pregnancy with your employer. Follow your healthcare provider s advice if your job needs you to stand for a long time, work with hazardous tools, or even sit at a desk all day. Your workspace, workload, or scheduled hours may need to be adjusted. Perhaps you can change body postures more often or take an extra break.   Advice for travel  Talk to your healthcare provider first, but the second trimester may be the best time for any travel. You may be advised to avoid certain trips while you re pregnant. Food and water can be concerns in developing countries. Travel by car is a good choice, as you can stop, get out, and stretch. Bring snacks and water along. Fasten the lap belt below your belly, low over your hips. Also be sure to wear the shoulder harness.   Intimacy  Unless your healthcare provider tells you to, there's no reason to stop having sex while you re pregnant. You or your partner may notice changes in desire. Desire may be less in the first trimester, due to nausea and fatigue. In the second trimester, sex may be very enjoyable. The third trimester can be a challenge comfort-wise. Try different positions and see what s best for you both.   "[employer] ([employer])" last reviewed this educational content on 4/1/2020 2000-2022 The StayWell Company, LLC. All rights reserved. This information is not intended as a substitute for professional medical care. Always follow your healthcare professional's instructions.          Pregnancy: Common Questions  There are plenty of myths and  old wives  tales  about pregnancy. You may need help  fact from fiction. On this sheet, you ll find answers to a few common questions. If you have other questions, talk with your healthcare provider.    Will working harm my baby?  In most cases, working throughout your pregnancy is not harmful at  all. There may be concerns if the job involves dangerous machinery or chemicals, lifting, or standing for very long periods of time. Talk with your healthcare provider and employer about your particular job and pregnancy.  Is it safe to have sex during pregnancy?  Yes, unless you are specifically advised not to by your healthcare provider.  Why can t I change the cat litter box?  Cats carry a disease called toxoplasmosis. In adult humans, it shows up as a mild infection of the blood and organs. If you are infected during pregnancy, the baby s brain and eyes could be damaged. To be safe, have someone else change the litter. If you must handle it, wear a paper mask over your nose and mouth. Also, wear gloves and wash your hands afterward.  Which medicines are safe?  No prescription or over-the-counter medicine is safe for everyone all of the time. But sometimes medicines are needed. Be sure your healthcare provider knows you are pregnant. Then use only the medicines he or she advises you to take.  Is it true that I can overheat my baby?  Yes. To prevent making your baby too warm:  Don t sit in a Jacuzzi. A long, warm bath is fine, but not in water over 100 F (37.7 C).  Exercise less intensely if you feel tired. Base your workout on how you feel, not your heart rate. Heart rates aren t a good way to measure effort during pregnancy.  Can I lift and carry safely?  Yes, if your healthcare provider doesn t tell you otherwise. Learn to lift and carry safely to prevent injury and reduce back pain during pregnancy. To protect your back:  Bend at the knees to bring the load nearer.  Get a good . Test the weight of the load.  Tighten your belly. Exhale as you lift.  Lift with your legs, not with your back.  Carry the load close to your body.  Hold the load so you can see where you are going.  What if I get sick?  Most women get sick at least once during pregnancy. Talk with your healthcare provider if you do. Most likely it  will not affect your pregnancy. Get plenty of rest and fluids, and eat what you can. Talk with your provider before taking any medicines.  [employer] last reviewed this educational content on 8/1/2020 2000-2022 The StayWell Company, LLC. All rights reserved. This information is not intended as a substitute for professional medical care. Always follow your healthcare professional's instructions.          Comfort Tips During Pregnancy  Pregnancy can bring discomfort of different kinds. Below are tips for ways to feel better. Talk with your healthcare provider before using pain-relieving medicine at any time during your pregnancy.    First trimester tips  Easing nausea  Get up slowly. Eat a few unsalted crackers before you get out of bed.  Avoid smells that bother you.  Eat small, bland, low-fat, high-protein meals at frequent intervals.  Sip on water, weak tea, or clear soft drinks, like ginger ale. Eat ice chips.  Try taking vitamin B6.  Coping with fatigue  Take catnaps when you can.  Get regular exercise.  Accept help from others.  Practice good sleep habits, like going to bed and getting up at the same time each day. Use your bed only for sleep and sex.  Calming mood swings  Talk about your feelings with others, including other mothers.  Limit sugar, chocolate, and caffeine.  Eat a healthy diet. Don t skip meals.  Get regular exercise.  Soothing headaches  Get fresh air and exercise.  Relax and get enough rest.  Check with your healthcare provider before taking any pain medicines.    Second trimester tips  To limit ankle swelling, sit with your feet raised or wear support hose.  If you have pain in your groin and stomach (round ligament pain), don't make sudden twisting movements with your body.  For leg cramps, flexing your foot often brings immediate relief. Also try massaging your calf in long, downward strokes, or stretching your legs before going to bed. Get enough exercise and wear shoes with flexible  soles. Eat foods rich in calcium.    Third trimester tips  Reducing heartburn  Eat small, light meals throughout the day rather than 3 large ones.  Sleep with your upper body raised 6 inches. Don t lie down until 2 hours after you eat.  Don't eat greasy, fried, or spicy foods.  Don't have citrus fruits or juices.  Treating constipation  Eat foods high in fiber, such as whole-grain foods, and fresh fruit and vegetables).  Drink plenty of water.  Get regular exercise.  Ask about your healthcare provider about medicines that have docusate or psyllium.  Taking care of your breasts  Don't use harsh soaps or alcohol, which can make your skin too dry.  Wear nursing bras. They provide more support than regular bras and can be used after pregnancy if you breastfeed.  Getting a good night s sleep  Take a warm shower before bed.  Sleep on a firm mattress.  Lie on your side with 1 leg crossed over the other.  Use pillows to support your arms, legs, and belly.    [employer] last reviewed this educational content on 8/1/2020 2000-2022 The StayWell Company, LLC. All rights reserved. This information is not intended as a substitute for professional medical care. Always follow your healthcare professional's instructions.          Folic Acid Supplements  Folic acid is also called folate. It is one of the B vitamins. Nutrition experts are just starting to learn more about how folic acid helps the body. It is needed to prevent a shortage of red blood cells (a type of anemia). Experts have also found that folic acid can prevent some birth defects.     How much folic acid do you need?  Adults should have 400 mcg (micrograms) of folic acid each day. Adults may need more if they are planning to get pregnant, are pregnant, or are breastfeeding. Talk with your healthcare provider to find the right amount of folic acid for you.   Why use a supplement?  Taking folic acid both before and during pregnancy is important. This can prevent birth  defects of the spine and brain (neural tube defects). A supplement may also be helpful if you drink alcohol often. You may want to use a folic acid supplement if any of the following is true for you:   [] I am a person of childbearing age.   [] I am planning to get pregnant.  [] I rarely eat leafy green vegetables, dried beans, or lentils.   [] I rarely eat cereal and whole grains (wheat germ, brown rice, whole-wheat bread).  [] I often have more than 1 drink of alcohol a day.   If you take folic acid  Here are some tips to help you get the most from a folic acid supplement:  Read the label to be sure the product won't  soon.  Choose a supplement that provides 400 to 800 mcg of folic acid.  Store the supplement in a cool, dry place, away from sun and heat.  Eat a healthy diet to get all the nutrients your body needs.  Foods that have folic acid  Folic acid is found mainly in plants. Some good sources include:  Dark green leafy vegetables such as spinach, kale, collards, and lizbeth lettuce  Lentils, chickpeas, and dried beans such as ugarte, kidney, and black beans  Asparagus, bok stuart, broad-beans, broccoli, and [place] sprouts  Avocados, oranges, and orange juice  Wheat germ and whole-wheat products  Products fortified with folic acid, such as cereal, pasta, bread, and rice  Deloris last reviewed this educational content on 2022-2022 The [employer]Well Company, LLC. All rights reserved. This information is not intended as a substitute for professional medical care. Always follow your healthcare professional's instructions.          Anemia During Pregnancy  Anemia is a condition in which the red blood cell count is too low. In pregnant women, this is often caused by not having enough iron in the blood. Anemia is common in pregnancy and very easy to treat.   Why you need iron   While pregnant, your body uses iron to make red blood cells for you and your baby. These cells bring oxygen to your baby and to  the rest of your body. Not having enough red blood cells can cause your baby to be born too small. But this is rare, as it s easy for you to get enough iron.   Testing for anemia   The only way to know if you have anemia is to have a simple test called a CBC (complete blood count). This is a routine test that will be done at one of your first prenatal visits. This test may be done again, at about week 26 to week 28.   Treating anemia   If you have anemia due to low iron content, follow the advice of your healthcare provider. Eating foods high in iron and taking supplements can help you get the iron you need.   Eating foods high in iron      Green leafy vegetables and nuts are a good source of iron.     Eat foods that are high in iron such as:   Red meat (limit organ meats such as liver)  Seafood (be sure it s fully cooked), and don't eat fish that are high in mercury, such as swordfish, tilefish, viola mackerel, and shark  Tofu  Eggs  Green, leafy vegetables  Whole grains and iron-fortified cereals  Dried fruits and nuts  Taking iron supplements   In most cases, a prenatal vitamin can provide enough iron. But if you need more, your healthcare provider may prescribe an iron supplement. Swallow iron pills with a glass of orange or cranberry juice. The vitamin C in these fruit juices can help your body absorb iron. But don t take your iron pills with juices that have calcium added to them. They can keep your body from absorbing the iron.   Iron supplements   Iron supplements may have certain side effects. They may cause your stools to turn black, and make you feel sick to your stomach or constipated. Here are some tips that may help you limit side effects:   Start slowly. Take 1 pill a day for a few days. Then work up to your prescribed dose over time.  Take your pills with meals, and not at bedtime.  Increase the fiber in your diet. Eat more whole grains, fruits, and vegetables.  Do mild exercise each day.  If advised  by your healthcare provider, take a stool softener.  Off[employer]i last reviewed this educational content on 2/1/2020 2000-2022 The StayWell Company, LLC. All rights reserved. This information is not intended as a substitute for professional medical care. Always follow your healthcare professional's instructions.          Rh-Negative Screening  If you have Rh-negative blood, your baby may be at risk for health problems. This is true only if your baby has Rh-positive blood. A simple test followed by treatment can help prevent problems.   What are the risks?  If the blood of your baby is Rh positive, your Rh-negative blood may form antibodies. These antibodies will attack the Rh-positive blood. This is called Rh disease. Rh disease can cause your baby to lose blood cells or have other health problems. Medical treatment can prevent Rh disease by keeping antibodies from forming.   How are you tested?  A simple blood test shows if you re Rh negative. This test is done very early in your pregnancy. If you re Rh negative, you ll have a second blood test near week 28 of pregnancy. This test will check whether or not your blood contains Rh antibodies.     Off[employer]i last reviewed this educational content on 4/1/2020 2000-2022 The [employer]Well Company, LLC. All rights reserved. This information is not intended as a substitute for professional medical care. Always follow your healthcare professional's instructions.          What Is Prenatal Care?  Before getting pregnant, you may have added some good health habits to get ready for your baby. But if you didn t, start today. One of the first steps is learning how to take care of yourself. See your healthcare provider as soon as you think you may be pregnant. Then continue prenatal care during your pregnancy.     Prenatal care helps you have a healthy baby   During prenatal care:  Your healthcare provider checks the health of your pregnancy. They'll calculate a due date. This gives an  estimate of your baby's delivery. Many people give birth between 38 and 41 weeks of pregnancy. Your due date is found by counting 40 weeks from the first day of your last menstrual period.  Your pregnancy's progress is checked. This includes your baby s growth, fetal heart rate, changes in your weight and blood pressure, and your overall health and comfort.  Your provider may find new concerns and manage current ones before problems happen.  Your provider will check lab work through blood and urine.  Your provider will talk about normal changes that happen during pregnancy. They'll also talk about changes that may not be normal. And they'll advise you about lifestyle changes.  Your provider will answer your questions. They'll also help you get ready for labor and delivery.  You're part of a team  When you re pregnant, you re part of a team. This team includes you, your baby, and your provider. It also may include a partner or a main support person. That could be a loved one, such as a spouse, a family member, or a friend. As you work to give your baby a healthy start, rely on your team members for support.   It s not too late to start good habits   What matters most is protecting your baby from this moment on. If you smoke, drink alcohol, or use illegal drugs, now's the time to stop. If you need help, talk with your healthcare provider.   Smoking increases the risk of losing your baby. Or of having a low-birth-weight baby. If you smoke, quit now.  Alcohol and drugs have been linked with many problems. These include miscarriage, birth defects, intellectual disability, and low birth weight. Stay away from alcohol and drugs.  Eat a healthy diet. This helps keep you and your baby strong and healthy. Follow your provider's instructions for nutrition. Also stay within the guidelines you're given for healthy weight gain.  Take 400 micrograms to 800 micrograms (400 mcg to 800 mcg or 0.4 mg to 0.8 mg) of folic acid every  day. Take it for at least 1 month before getting pregnant. And keep taking it for the first trimester of your pregnancy. This is to lower your risk of some brain and spinal birth defects. You can get folic acid from some foods. But it's hard to get all the folic acid you'll need from foods alone. Talk with your provider about taking a folic acid supplement.  Regular exercise will help you stay fit and feel good during pregnancy. It can also help prevent or reduce back pain. Talk with your provider about how to exercise safely during pregnancy.  If you have a health condition, make sure it's under control. Some conditions include asthma, diabetes, depression, high blood pressure, obesity, thyroid disease, or epilepsy. Be sure your vaccines are up to date.  How daily issues affect your health  Many things in your daily life impact your health. This can include transportation, money problems, housing, access to food, and . If you can t get to medical appointments, you may not receive the care you need. When money is tight, it may be difficult to pay for medicines. And living far from a grocery store can make it hard to buy healthy food.   If you have concerns in any of these or other areas, talk with your healthcare team. They may know of local resources to assist you. Or they may have a staff person who can help.   [employer] last reviewed this educational content on 8/1/2020 2000-2022 [employer]. All rights reserved. This information is not intended as a substitute for professional medical care. Always follow your healthcare professional's instructions.          Understanding Intimate Partner Violence  Intimate partner violence (IPV) affects hundreds of thousands of women. But the true number may be much higher because many women may not report it. Partner violence often starts or gets worse during and after pregnancy. This may be from the stress of pregnancy and a new baby. IPV can result  in pregnancy complications, poor postpartum care, and poor health of the baby.  You may feel alone if you are experiencing intimate partner violence during or after your pregnancy, but know that you re not. It s far more common than you think. And there s help and hope. Talk with your healthcare provider if you are not safe.  Types of intimate partner violence  Most people think of IPV as just physical abuse. While it does often include physical abuse, IPV can be emotional and sexual abuse. These other forms of abuse are sometimes harder to see. This is especially true for emotional abuse, because it can distort your own viewpoint.  Physical abuse includes using physical force to hurt or disable a partner. It can include throwing objects, pushing, kicking, biting, slapping, strangling, hitting, or beating.  Emotional abuse includes making threats, and controlling money or other resources. It s anything that erodes a person s sense of self-worth. It may look like name-calling, blaming, and stalking. It can also include isolation from friends, family, and even access to healthcare.  Sexual violence includes rape, unwanted kissing, and forced touching. It also includes reproductive coercion. This is holding power and control over the woman s reproductive health. It may look like efforts to sabotage contraception, having unsafe sex to purposefully expose the woman to sexually transmitted infections (STIs). It may also include forced  or injuries to cause a miscarriage.  Are you at risk for IPV?  IPV affects all genders, races, ethnicities, and social and economic statuses. But some people are at greater risk for being a victim or an abuser. Certain factors can increase the risk for IPV.  Individual factors  Having low self-esteem, being emotional dependent, insecure, and jealous  Having low income or being unemployed, having recent job loss or job instability  Being younger  Using alcohol or drugs  Being  "depressed, angry, having PTSD, or being hostile towards women  Having a history of abusing others, being abused, or witnessing abuse  Having poor problem solving skills  Having poor impulse control  Being a Native , , or  woman  Relationship factors  Marital or relationship conflict, frequent fighting  Having a jealous or possessive partner  Having control issues  Being under economic stress  Having poor social support  Having income inequality    It often helps to ask yourself these questions from the [employer] to know if you are in an abusive relationship:  Does my partner always put me down and make me feel bad about myself?  Has my partner caused harm or pain to my body?  Does my partner threaten me, the baby, my other children or himself?  Does my partner blame me for his actions? Does he tell me it's my own fault he hit me?  Is my partner becoming more violent as time goes on?  Has my partner promised never to hurt me again, but still does?  If you answered \"yes\" to any of these questions, you may be in an unhealthy relationship.  If you recognize yourself or your partner in any of these descriptors, talk with your healthcare provider to get help. If you are in danger, he or she can help you develop a safety plan.  Health effects  IPV during or after pregnancy can cause physical and emotional health effects, pregnancy complications, poor outcomes, and injury and harm to the baby after birth.  During pregnancy  Physical injuries such as bruises, cuts, or broken bones  Vaginal bleeding or pelvic pain  Early labor and delivery  Tearing of the placenta (placental abruption)  Maternal death  Infant health  Low infant birth weight  Infant who needs NICU care  Broken bones  Death of   After birth  After birth, the mother may:  Have pain and other long-term health conditions  Develop postpartum depression (2 to 3 times greater risk)  Have high-risk sexual behaviors  Use " harmful substances  Have unhealthy diet and lifestyle such as eating disorders  Abuse is never OK. One in 6 abused women are first abused during pregnancy, when it s dangerous to both you and your developing baby. Recognizing that you are in an abusive relationship is the first step to help. See your healthcare provider or someone else you trust who can help you develop a safety plan.  What to expect from your healthcare provider  It can be a hard to bring up partner violence with your healthcare provider. But it s an important first step in getting help. Rest assured, your conversation is confidential. He or she is a non-judgmental health professional. He or she likely has other patients with similar problems and recognizes the difficulty of the situation. Your provider may ask you questions such as:  Do you feel safe in your relationship?  Do you have a safe place to go in an emergency?  Do conflicts ever turn into physical fights?  Answer honestly and completely. There will be no pressure to disclose the abuse or to press charges. He or she won t ask about the abuse in front of a partner, friends, or family. The goal is to help you access help when you are ready.  Call   If you feel your safety is in jeopardy now, call or the police.  For more help  If the person who hurt you is your partner or spouse and your situation can become dangerous again, it's vital to make a safety plan. The National Domestic Violence Hotline can help you develop a plan that meets your personal situation.  National Domestic Violence Hotline , www.thehoFPSI.org, 336.194.5594  [employer] last reviewed this educational content on 5/1/2020 2000-2022 [employer]. All rights reserved. This information is not intended as a substitute for professional medical care. Always follow your healthcare professional's instructions.          Bleeding During Early Pregnancy   If you ve had bleeding early in your pregnancy, you re not  alone. Many other pregnant women have early bleeding, too. And in most cases, nothing is wrong. But your healthcare provider still needs to know about it. They may want to do tests to find out why you re bleeding. Call your provider if you see bleeding during pregnancy. Tell your provider if your blood is Rh negative. Then they can figure out if you need anti-D immune globulin treatment.   What causes early bleeding?   The cause of bleeding early in pregnancy is often unknown. But many factors early on in pregnancy may lead to light bleeding (called spotting) or heavier bleeding. These include:   Having sex  When the embryo implants on the uterine wall  Bleeding between the sac membrane and the uterus (subchorionic bleeding)  Pregnancy loss (miscarriage)  The embryo implants outside of the uterus (ectopic pregnancy)  If you see spotting   Light bleeding is the most common type of bleeding in early pregnancy. If you see it, call your healthcare provider. Chances are, they will tell you that you can care for yourself at home.   If tests are needed   Depending on how much you bleed, your healthcare provider may ask you to come in for some tests. A pelvic exam, for instance, can help see how far along your pregnancy is. You also may have an ultrasound or a Doppler test. These imaging tests use sound waves to check the health of your baby. The ultrasound may be done on your belly or inside your vagina. You may also need a special blood test. This test compares your hormone levels in blood samples taken 2 days apart. The results can help your provider learn more about the implantation of the embryo. Your blood type will also need to be checked to assess if you will need to be treated for Rh sensitization.       Ultrasound can help check the health of your fetus.     Warning signs   If your bleeding doesn t stop or if you have any of the following, get medical care right away:   Soaking a sanitary pad each hour  Bleeding  like you re having a period  Cramping or severe belly pain  Feeling dizzy or faint  Tissue passing through your vagina  Bleeding at any time after the first trimester  Questions you may be asked   Bleeding early in pregnancy isn't normal. But it is common. If you ve seen any bleeding, you may be concerned. But keep in mind that bleeding alone doesn t mean something is wrong. Just be sure to call your healthcare provider right away. They may ask you questions like these to help find the cause of your bleeding:   When did your bleeding start?  Is your bleeding very light or is it like a period?  Is the blood bright red or brownish?  Have you had sex recently?  Have you had pain or cramping?  Have you felt dizzy or faint?  Monitoring your pregnancy   Bleeding will often stop as quickly as it began. Your pregnancy may go on a normal path again. You may need to make a few extra prenatal visits. But you and your baby will most likely be fine.   [employer] last reviewed this educational content on 1/1/2022 2000-2022 [employer]. All rights reserved. This information is not intended as a substitute for professional medical care. Always follow your healthcare professional's instructions.          Healthy Eating Habits During Pregnancy  It s important to develop healthy eating habits while you are pregnant, for you as well as for your baby. Here are some ways to stay healthy.   Aim for a healthy weight  A slow, steady rate of weight gain is often best. After the first trimester, you may gain about a pound a week. If you were overweight before pregnancy, you need to gain fewer pounds. Your healthcare provider can give you a healthy weight goal for your pregnancy.   Don t diet  Now is not the time to diet. You may not get enough of the nutrients you and your baby need. Instead, learn how to be a healthy eater. Start by doing it for your baby. Soon, you may do it for yourself.   Vitamins and supplements  Talk with  your healthcare provider about taking these and other prenatal vitamins and supplements.   Iron makes the extra blood you need now.  Calcium and vitamin D help build and keep strong bones.  Folic acid helps prevent certain birth defects.  Iodine helps the thyroid work right.  Some vitamins may not be safe to take. Your healthcare provider will tell you which ones to avoid.  Fluids    Drink at least 8 to 10 cups of fluid daily. Your baby needs fluids. Fluids also decrease constipation, flush out toxins and waste, limit swelling, and help prevent bladder infections. Water is best. Other good choices are:   Water or seltzer water with a slice of lemon or lime. (These can also help ease an upset stomach.)  Clear soups that are low in salt  Low-fat or fat-free milk, soy or rice milk with calcium added  Popsicles or gelatin  Things to avoid  Some things might harm your growing baby. Don t eat or drink:   Alcohol  Unpasteurized dairy foods and juices  Raw or undercooked meat, poultry, fish, or eggs  Unwashed fruits and vegetables  Prepared meats, like deli meats or hot dogs, unless heated until steaming hot  Fish that are high in mercury, like shark, swordfish, viola mackerel, tilefish, and albacore tuna  Things to limit  Ask your healthcare provider whether it s safe to eat or drink:   Caffeine  Artificial sweeteners  Organ meats  Certain types of fish  Fish and shellfish that contain mercury in lower amounts, like shrimp, canned light tuna, salmon, pollock, and catfish  [employer] last reviewed this educational content on 7/1/2021 2000-2022 [employer]. All rights reserved. This information is not intended as a substitute for professional medical care. Always follow your healthcare professional's instructions.          Pregnancy: Planning Your Exercise Routine  While you re pregnant, an exercise routine helps both your mind and your body feel good. It tones your muscles and makes them stronger. It also  gives you and your baby more oxygen.   The right exercise for you    Overall conditioning is best for you and your baby. Try walking, swimming, or riding a stationary bike. Always warm up, cool down, and drink enough fluids. Keep a snack close by in case your blood sugar gets low. Discuss exercise choices with your healthcare provider. Talk about the following:   If you already exercise, find out how to adapt your routine while you re pregnant. Keep the intensity of the exercise moderate. As your pregnancy progresses, your center of gravity will change. Be careful to keep your balance.  Ask if there are any local prenatal exercise classes, such as yoga or water aerobics. Find out which prenatal exercise videos are good choices.  If you were not exercising before your pregnancy, find out the best way to start. Now is not the time to begin a new workout on your own. Start slowly. Listen to your body.  Ask which forms of exercise you should avoid. These may include risky activities like hot yoga, horseback riding, scuba diving, skiing, skating, and contact sports.  Pelvic tilts  These help strengthen your stomach muscles and low back. You can do pelvic tilts instead of sit-ups.   Do this exercise on your hands and knees.  Relax the back of your neck. Pull your stomach in until your low back flattens.  Hold for 30 seconds. Release. Repeat 10 times. Do this twice a day.  Kegel exercises  Kegel exercises strengthen the pelvic muscles. Doing Kegels daily helps prepare these muscles for delivery. Kegels also help ease your recovery. You exercise these muscles by tightening, holding, then relaxing them. To do 1 type of Kegel exercise, contract as if you were stopping your urine stream (but do it when you re not urinating). Hold for 10 seconds, then repeat 10 times, a few times a day.   Tips to add activity  Here are some tips to follow:  Park the car farther from a store and walk.  If you can, do errands on foot instead of  driving.  Walk across the office to talk to someone in person instead of calling.  While waiting for appointments, go up and down stairs or around the block.  Tips to stay active  Here are some tips to follow:  Maintain your routine. But exercise less intensely if you feel tired.  Base your workout on how you feel, not your heart rate. Heart rates aren t a good way to measure effort during pregnancy.  Don't exercise on your back after week 16.  What are the warning signs that I should stop exercising?  Stop exercising and call your healthcare provider if you have any of these symptoms:  Vaginal bleeding   Dizziness or feeling faint   Increased shortness of breath   Chest pain   Headache   Muscle weakness   Calf (back of the leg) pain or swelling    Uterine contractions or  labor   Decreased fetal movement   Fluid leaking (or gushing) from your vagina  [employer] last reviewed this educational content on 2021-2022 [employer]. All rights reserved. This information is not intended as a substitute for professional medical care. Always follow your healthcare professional's instructions.          Nutrition During Pregnancy   Having a healthy baby depends mostly on you. What you eat matters to your baby and your health. During pregnancy, you will likely need about 300 more calories per day than before you became pregnant. Each day, try to eat the number of servings listed here for each food group. In addition, cut down on salt and caffeine. Limit the amount of sweets and high-fat foods you eat. Don t smoke or drink alcohol.     Important: See your healthcare provider as often as requested. If you have any questions, be sure to ask them.   Fruits Vegetables Grains & Cereals* Fats & Oils   2 cups  Examples of 1-cup servings:   1 medium apple  1 medium orange  1 medium banana  1 cup chopped fruit  1 cup 100% fruit juice (pasteurized)   1/2 cup dried fruit 2-1/2 to 3 cups   Examples of 1  servin cups raw, leafy greens   1 cup raw or cooked cut-up vegetables   1 cup 100% vegetable juice (pasteurized)  6 to 8 ounces  Examples of 1-ounce servings:   1 slice bread  1/2 cup cooked rice  1/2 cup cooked cereal   1/2 cup pasta  1 ounce cold cereal 6 to 8 teaspoons   Dairy** Protein--- Fluids      3 cups  Examples of 1-cup servings:   1 cup milk  1 cup yogurt  1-1/2 ounces natural cheese   2 ounces processed cheese  5 to 6-1/2 ounces  Examples of 1-ounce servings:   1 egg  1 ounce of lean meat, poultry, or fish   1/4 cup cooked beans   1 tablespoon peanut butter   1/2 ounce nuts 8 or more 8-ounce glasses   Examples:  Water  Mineral water  Clear soups, broth      *Note: Choose whole grains whenever possible.   ** Note: Try to choose low-fat options; stay away from soft cheeses and unpasteurized milk.   --- Notes: Don't eat raw or undercooked meats, eggs, seafood, fish, and shellfish. Also, some types of fish, such as shark, swordfish, and viola mackerel, should not be eaten during pregnancy. Don't eat hot dogs, lunch meats, or cold cuts unless heated to steaming just before being served. Ask your healthcare provider about safe choices.   Prenatal supplements  A prenatal supplement is a pill that you take daily during pregnancy. It helps make sure you re getting the right amount of certain nutrients that are important to your baby. Ask your healthcare provider to help you choose the best one for you. Important nutrients during pregnancy include:   Folic acid. It's best to start taking this supplement 1 month before you start trying to get pregnant. Folic acid helps prevent certain problems in your baby. During pregnancy, you need to take 400 micrograms (mcg) of folic acid every day for the first 2 to 3 months after conception. After that, 600 mcg is needed for a growing baby and placenta.  Iron, calcium, and vitamin D. You may also be advised to take these supplements during pregnancy. They help keep you  and your baby healthy. Take them at different times because calcium makes it hard for the body to absorb iron. Taking iron with orange juice helps to increase its absorption.  [employer] last reviewed this educational content on 8/1/2020 2000-2022 [employer]. All rights reserved. This information is not intended as a substitute for professional medical care. Always follow your healthcare professional's instructions.          Pregnancy: Your Weight  Being a healthy weight is important for both you and your baby. The weight you gain now is not just extra fat. It is also the weight of your baby. And it is the increased blood and fluids to support the baby. A slow, steady rate of gain is best. How much you should gain depends on your weight before getting pregnant. Check with your healthcare provider to find out what is right for you.      Your weight will be checked regularly by your healthcare provider.     Talk to your healthcare provider if you have any questions.   If you gain too much  Gaining too much weight might cause you to feel tired, or you could have a harder pregnancy or birth. If you and your healthcare provider decide you re gaining too much:   Eat fewer fats and sugars. Instead, eat fruit, vegetables, and whole-grain foods.  Drink plenty of water between meals.  Get at least 20 minutes of light exercise, like walking, each day.  Don t diet. You might not get enough of the nutrients you and your baby need.  Keep a food diary to help you gauge what and how much you are eating.  If you're not gaining enough  If you don t gain enough, your baby could be too small or have health problems. Women tend to gain most of their weight in the second and third trimesters. For now:   Eat many types of foods. Make sure you get enough calcium, protein, and carbohydrates.  Don t skip meals.  Eat healthy snacks.  Pick nutrient-dense, high-calorie healthy food like trail mix or protein shakes.  See a  dietitian for help.  Talk to your healthcare provider if you have had an eating disorder or problems with certain foods.  The following are ways to get more calories:  Eat breakfast every day. Peanut butter or a slice of cheese on toast can give you an extra protein boost.  Snack between meals. Yogurt and dried fruits can provide protein, calcium, and minerals.  Try to eat more foods that are high in good fats, like nuts, fatty fish, avocados, and olive oil.  Drink juices made from real fruit that are high in vitamin C or beta-carotene, like grapefruit juice, orange juice, papaya nectar, apricot nectar, and carrot juice.  Don't eat junk food, like foods high in sugar.  [employer] last reviewed this educational content on 7/1/2021 2000-2022 [employer]. All rights reserved. This information is not intended as a substitute for professional medical care. Always follow your healthcare professional's instructions.        You have been provided the CDC Warning Signs in Pregnancy document.    Additional copies can be found here: [email or web address]

## 2023-04-22 LAB — BACTERIA UR CULT: NORMAL

## 2023-04-23 ENCOUNTER — HEALTH MAINTENANCE LETTER (OUTPATIENT)
Age: 28
End: 2023-04-23

## 2023-04-23 PROBLEM — E66.811 CLASS 1 OBESITY DUE TO EXCESS CALORIES IN ADULT: Status: ACTIVE | Noted: 2023-04-18

## 2023-04-23 PROBLEM — E66.09 CLASS 1 OBESITY DUE TO EXCESS CALORIES IN ADULT: Status: ACTIVE | Noted: 2023-04-18

## 2023-04-28 ENCOUNTER — NURSE TRIAGE (OUTPATIENT)
Dept: OBGYN | Facility: CLINIC | Age: 28
End: 2023-04-28
Payer: COMMERCIAL

## 2023-04-28 DIAGNOSIS — O21.9 NAUSEA AND VOMITING IN PREGNANCY: ICD-10-CM

## 2023-04-28 DIAGNOSIS — G43.909 MIGRAINE: Primary | ICD-10-CM

## 2023-04-28 RX ORDER — METOCLOPRAMIDE 5 MG/1
5 TABLET ORAL 4 TIMES DAILY PRN
Qty: 90 TABLET | Refills: 1 | Status: SHIPPED | OUTPATIENT
Start: 2023-04-28 | End: 2023-04-28

## 2023-04-28 RX ORDER — METOCLOPRAMIDE 5 MG/1
5 TABLET ORAL 4 TIMES DAILY PRN
Qty: 90 TABLET | Refills: 1 | Status: SHIPPED | OUTPATIENT
Start: 2023-04-28 | End: 2023-09-13

## 2023-04-28 NOTE — TELEPHONE ENCOUNTER
Patient calling back about Reglan prescription. [facility] stating that they did not receive the medication.    metoclopramide (REGLAN) 5 MG tablet 90 tablet 1 4/28/2023  --   Sig - Route: Take 1 tablet (5 mg) by mouth 4 times daily as needed (Migraine and/or nausea) - Oral   Class: Local Print     Will send the above RX electronically to [facility].   Dariana Morin RN   04/28/23 5:15 PM  [facility] Nurse Advisor

## 2023-04-28 NOTE — TELEPHONE ENCOUNTER
Discussed migraine during pregnancy with CNM Woodrow Weinberg, who recommended Reglan.  Ordered as verbal to patient's pharmacy.      Called and instructed patient to try switching from Zofran to Reglan and to call back if symptoms not resolving.     Reason for Disposition    MILD-MODERATE headache and present > 72 hours    Additional Information    Negative: Difficult to awaken or acting confused (e.g., disoriented, slurred speech)    Negative: Weakness of the face, arm or leg on one side of the body and new-onset    Negative: Numbness of the face, arm or leg on one side of the body and new-onset    Negative: Loss of speech or garbled speech and new-onset    Negative: Passed out (i.e., fainted, collapsed and was not responding)    Negative: Sounds like a life-threatening emergency to the triager    Negative: Followed a head injury within last 3 days    Negative: Traumatic Brain Injury (TBI) is suspected    Negative: Sinus pain of forehead and yellow or green nasal discharge    Negative: Unable to walk without falling    Negative: Stiff neck (can't touch chin to chest)    Negative: Possibility of carbon monoxide exposure    Negative: SEVERE headache (e.g., excruciating) and having contractions or other symptoms of labor    Negative: Pregnant 20 or more weeks with Systolic BP >= 140 OR Diastolic BP >= 90    Negative: Pregnant 20 or more weeks and SEVERE headache (e.g., excruciating) that is not improved after 2 hours of pain medicine    Negative: Pregnant 20 or more weeks and new hand or face swelling    Negative: Pregnant 20 or more weeks and new blurred vision or vision changes    Negative: Pregnant 20 or more weeks and sudden weight gain (i.e., more than 3 lbs or 1.4 kg in one week)    Negative: Pregnant 20 or more weeks and upper abdomen pain    Negative: Pregnant 23 or more weeks and baby is moving less today (e.g., kick count < 5 in 1 hour or < 10 in 2 hours)    Negative: SEVERE headache and 'worst headache' of  "life    Negative: SEVERE headache, sudden-onset (i.e., reaching maximum intensity within seconds to 1 hour)    Negative: Severe pain in one eye    Negative: Loss of vision or double vision  (Exception: Similar to previous migraines.)    Negative: Patient sounds very sick or weak to the triager    Negative: Fever > 103 F (39.4 C)    Negative: Pregnant less than 20 weeks and SEVERE headache (e.g., excruciating) and not improved after 2 hours of pain medicine    Negative: SEVERE headache and fever    Negative: Vomiting 2 or more times  (Exception: Similar to previous migraines.)    Negative: MODERATE headache (e.g., interferes with normal activities), present more than 24 hours, and unexplained    Negative: New headache and weak immune system (e.g., HIV positive, cancer chemo, splenectomy, organ transplant, chronic steroids)    Negative: Sinus pain of forehead and yellow or green nasal discharge    Negative: Fever present > 3 days (72 hours)    Answer Assessment - Initial Assessment Questions  1. LOCATION: \"Where does it hurt?\"       Head    2. ONSET: \"When did the headache start?\" (e.g., minutes, hours or days)       3 days ago    3. PATTERN: \"Does the pain come and go, or has it been constant since it started?\"      Constant    4. SEVERITY: \"How bad is the pain?\" and \"What does it keep you from doing?\"     - MILD - doesn't interfere with normal activities     - MODERATE - interferes with normal activities or awakens from sleep     - SEVERE - excruciating pain, unable to do any normal activities         Moderate    5. RECURRENT SYMPTOM: \"Have you ever had headaches before?\" If Yes, ask: \"When was the last time?\" and \"What happened that time?\"       Yes, migraines during last pregnancy    6. CAUSE: \"What do you think is causing the headache?\"      Migraine    7. MIGRAINE: \"Have you been diagnosed with migraine headaches?\" If Yes, ask: \"Is this headache similar?\"       Yes, during last pregnancy.  This is similar but " "she was able to treat with Tylenol during last pregnancy, and Tylenol is not helping this time.     8. HEAD INJURY: \"Has there been any recent injury to the head?\"       No    9. OTHER SYMPTOMS: \"Do you have any other symptoms?\" (e.g., abdomen pain, blurred vision, fever, stiff neck; swelling of hands, face, or feet)      None    10. PREGNANCY: \"How many weeks pregnant are you?\"        10w1d    11. CHRISTIANE: \"What date are you expecting to deliver?\"    Protocols used: PREGNANCY - HEADACHE-A-OH    "

## 2023-04-28 NOTE — TELEPHONE ENCOUNTER
Patient called triage line- has had migraine for the past 3 days.  She reports having had migraines with her last pregnancy that were usually resolved with Tylenol.  Has now taken two doses/day the past three days without any relief.      Wonders what recommendations the CNMs have for her migraines?  Is there anything she can take besides Tylenol?

## 2023-05-04 ENCOUNTER — TELEPHONE (OUTPATIENT)
Dept: OBGYN | Facility: CLINIC | Age: 28
End: 2023-05-04
Payer: COMMERCIAL

## 2023-05-04 ENCOUNTER — PRE VISIT (OUTPATIENT)
Dept: MATERNAL FETAL MEDICINE | Facility: CLINIC | Age: 28
End: 2023-05-04
Payer: COMMERCIAL

## 2023-05-04 DIAGNOSIS — O09.91 SUPERVISION OF HIGH RISK PREGNANCY IN FIRST TRIMESTER: Primary | ICD-10-CM

## 2023-05-04 NOTE — TELEPHONE ENCOUNTER
Received call from patient. Patient reports that she wants to  her prenatal but she was told by the pharmacy that they are waiting to hear back from the provider. Spoke with [facility] Pharmacist who reports she has refills on the Rx and will fill it now.

## 2023-05-12 ENCOUNTER — TELEPHONE (OUTPATIENT)
Dept: OBGYN | Facility: CLINIC | Age: 28
End: 2023-05-12
Payer: COMMERCIAL

## 2023-05-12 DIAGNOSIS — O09.91 SUPERVISION OF HIGH RISK PREGNANCY IN FIRST TRIMESTER: Primary | ICD-10-CM

## 2023-05-12 RX ORDER — PRENATAL VIT/IRON FUM/FOLIC AC 27MG-0.8MG
1 TABLET ORAL DAILY
Qty: 90 TABLET | Refills: 3 | Status: SHIPPED | OUTPATIENT
Start: 2023-05-12 | End: 2023-05-12

## 2023-05-12 RX ORDER — PNV NO.95/FERROUS FUM/FOLIC AC 28MG-0.8MG
1 TABLET ORAL DAILY
Qty: 90 CAPSULE | Refills: 3 | Status: SHIPPED | OUTPATIENT
Start: 2023-05-12 | End: 2024-05-15

## 2023-05-12 NOTE — TELEPHONE ENCOUNTER
Called patient back. No answer and VM box full. Sending a MyChart to gather more information about prenatal vitamin.

## 2023-05-12 NOTE — TELEPHONE ENCOUNTER
Mi called back.  She was most recently at her pharmacy ([facility] in [place] on [address]), and was told she could not  her prenatal vitamins.      I called the pharmacy. They were confused because they were still seeing her old provider's prenatal prescription, as well as Steph's, which was just for DHA.  I told them to disregard both and I would be sending in a new prescription.      I clarified with CNDOMONIQUE Sewell in clinic, who instructed to order a prenatal with DHA that is covered by the patient's insurance, which I did.      I called Mi back and let her know she could pick it up.

## 2023-05-12 NOTE — TELEPHONE ENCOUNTER
[facility] Call Center    Phone Message    May a detailed message be left on voicemail: yes     Reason for Call: Other: .  Pt calling returning call from ZULEIKA Carrero regarding pt prenatal vitamins. Please call pt     Action Taken: Message routed to:  Other: [facility]    Travel Screening: Not Applicable

## 2023-05-12 NOTE — TELEPHONE ENCOUNTER
Left vm on triage line with questions regarding prenatal prescription.  It is not covered by insurance the way it was sent. Please call back

## 2023-05-18 NOTE — PATIENT INSTRUCTIONS
Thank you for trusting us with your care!     If you need to contact us for questions about:  Symptoms, Scheduling & Medical Questions; Non-urgent (2-3 day response) [place] message, Urgent (needing response today) [phone] (if after 3:30pm next day response)   Prescriptions: Please call your Pharmacy   Billing: [facility] [phone] or [facility]:[phone]

## 2023-05-19 ENCOUNTER — PRE VISIT (OUTPATIENT)
Dept: MATERNAL FETAL MEDICINE | Facility: CLINIC | Age: 28
End: 2023-05-19
Payer: COMMERCIAL

## 2023-05-19 ENCOUNTER — OFFICE VISIT (OUTPATIENT)
Dept: OBGYN | Facility: CLINIC | Age: 28
End: 2023-05-19
Attending: ADVANCED PRACTICE MIDWIFE
Payer: COMMERCIAL

## 2023-05-19 VITALS
DIASTOLIC BLOOD PRESSURE: 66 MMHG | WEIGHT: 209.8 LBS | SYSTOLIC BLOOD PRESSURE: 102 MMHG | HEART RATE: 74 BPM | HEIGHT: 66 IN | BODY MASS INDEX: 33.72 KG/M2

## 2023-05-19 DIAGNOSIS — O13.9 GESTATIONAL HYPERTENSION, ANTEPARTUM: ICD-10-CM

## 2023-05-19 DIAGNOSIS — E66.811 CLASS 1 OBESITY DUE TO EXCESS CALORIES WITHOUT SERIOUS COMORBIDITY WITH BODY MASS INDEX (BMI) OF 33.0 TO 33.9 IN ADULT: ICD-10-CM

## 2023-05-19 DIAGNOSIS — O09.91 SUPERVISION OF HIGH RISK PREGNANCY IN FIRST TRIMESTER: Primary | ICD-10-CM

## 2023-05-19 DIAGNOSIS — E66.09 CLASS 1 OBESITY DUE TO EXCESS CALORIES WITHOUT SERIOUS COMORBIDITY WITH BODY MASS INDEX (BMI) OF 33.0 TO 33.9 IN ADULT: ICD-10-CM

## 2023-05-19 PROCEDURE — G0463 HOSPITAL OUTPT CLINIC VISIT: HCPCS | Performed by: ADVANCED PRACTICE MIDWIFE

## 2023-05-19 PROCEDURE — 99207 PR PRENATAL VISIT: CPT | Performed by: ADVANCED PRACTICE MIDWIFE

## 2023-05-19 RX ORDER — MULTIVIT-MIN/IRON/FOLIC ACID/K 18-600-40
1 CAPSULE ORAL DAILY
Qty: 90 CAPSULE | Refills: 3 | Status: SHIPPED | OUTPATIENT
Start: 2023-05-19 | End: 2023-10-11

## 2023-05-19 RX ORDER — ASPIRIN 81 MG/1
81 TABLET ORAL DAILY
Qty: 90 TABLET | Refills: 3 | Status: ON HOLD | OUTPATIENT
Start: 2023-05-19 | End: 2023-11-21

## 2023-05-19 RX ORDER — METAPROTERENOL SULFATE 10 MG
1000 TABLET ORAL DAILY
Qty: 90 CAPSULE | Refills: 3 | Status: SHIPPED | OUTPATIENT
Start: 2023-05-19 | End: 2023-12-21

## 2023-05-19 ASSESSMENT — ANXIETY QUESTIONNAIRES
2. NOT BEING ABLE TO STOP OR CONTROL WORRYING: SEVERAL DAYS
6. BECOMING EASILY ANNOYED OR IRRITABLE: SEVERAL DAYS
1. FEELING NERVOUS, ANXIOUS, OR ON EDGE: MORE THAN HALF THE DAYS
GAD7 TOTAL SCORE: 6
GAD7 TOTAL SCORE: 6
7. FEELING AFRAID AS IF SOMETHING AWFUL MIGHT HAPPEN: SEVERAL DAYS
IF YOU CHECKED OFF ANY PROBLEMS ON THIS QUESTIONNAIRE, HOW DIFFICULT HAVE THESE PROBLEMS MADE IT FOR YOU TO DO YOUR WORK, TAKE CARE OF THINGS AT HOME, OR GET ALONG WITH OTHER PEOPLE: SOMEWHAT DIFFICULT
3. WORRYING TOO MUCH ABOUT DIFFERENT THINGS: SEVERAL DAYS
5. BEING SO RESTLESS THAT IT IS HARD TO SIT STILL: NOT AT ALL

## 2023-05-19 ASSESSMENT — PATIENT HEALTH QUESTIONNAIRE - PHQ9
SUM OF ALL RESPONSES TO PHQ QUESTIONS 1-9: 2
5. POOR APPETITE OR OVEREATING: NOT AT ALL

## 2023-05-19 ASSESSMENT — PAIN SCALES - GENERAL: PAINLEVEL: NO PAIN (0)
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
## 2023-05-24 ENCOUNTER — OFFICE VISIT (OUTPATIENT)
Dept: MATERNAL FETAL MEDICINE | Facility: CLINIC | Age: 28
End: 2023-05-24
Attending: ADVANCED PRACTICE MIDWIFE
Payer: COMMERCIAL

## 2023-05-24 ENCOUNTER — LAB (OUTPATIENT)
Dept: LAB | Facility: CLINIC | Age: 28
End: 2023-05-24
Attending: ADVANCED PRACTICE MIDWIFE
Payer: COMMERCIAL

## 2023-05-24 ENCOUNTER — MEDICAL CORRESPONDENCE (OUTPATIENT)
Dept: HEALTH INFORMATION MANAGEMENT | Facility: CLINIC | Age: 28
End: 2023-05-24

## 2023-05-24 ENCOUNTER — HOSPITAL ENCOUNTER (OUTPATIENT)
Dept: ULTRASOUND IMAGING | Facility: CLINIC | Age: 28
Discharge: HOME | End: 2023-05-24
Attending: ADVANCED PRACTICE MIDWIFE
Payer: COMMERCIAL

## 2023-05-24 DIAGNOSIS — O26.90 PREGNANCY RELATED CONDITION, ANTEPARTUM: ICD-10-CM

## 2023-05-24 DIAGNOSIS — Z36.9 FIRST TRIMESTER SCREENING: Primary | ICD-10-CM

## 2023-05-24 DIAGNOSIS — Z34.90 SUPERVISION OF NORMAL PREGNANCY: Primary | ICD-10-CM

## 2023-05-24 DIAGNOSIS — Z34.90 SUPERVISION OF NORMAL PREGNANCY: ICD-10-CM

## 2023-05-24 PROCEDURE — 76813 OB US NUCHAL MEAS 1 GEST: CPT

## 2023-05-24 PROCEDURE — 76813 OB US NUCHAL MEAS 1 GEST: CPT | Mod: 26 | Performed by: OBSTETRICS & GYNECOLOGY

## 2023-05-24 PROCEDURE — 36415 COLL VENOUS BLD VENIPUNCTURE: CPT

## 2023-05-24 PROCEDURE — 96040 HC GENETIC COUNSELING, EACH 30 MINUTES: CPT | Performed by: GENETIC COUNSELOR, MS

## 2023-05-24 NOTE — PROGRESS NOTES
"[facility]  Genetic Counseling Consult    Patient:  Mi Salgado YOB: 1995   Date of Service:  23   MRN: [id number]    Mi was seen at the [facility] [place] for genetic consultation. The indication for genetic counseling is desire to discuss options for genetic screening and diagnostics. The patient was unaccompanied to this visit.          IMPRESSION/ PLAN   During today's [facility] visit, Mi had a blood draw for NIPS through [employer]. The NIPS screens for trisomy 21, 18, and 13 and the patient opted to screen for sex chromosome aneuploidies, including reported fetal sex. Results are expected in 7-10 days. The patient will be called with results and if they do not answer they requested a detailed message with results on their voicemail, including the predicted fetal sex information.  Patient was informed that results, including fetal sex, will be available in [employer].    Mi had a nuchal translucency ultrasound today. Please see the ultrasound report for further details.      PREGNANCY HISTORY   /Parity:       Mi's pregnancy history is significant for:     : term, , male. Delivery complicated by GHTN in labor.    CURRENT PREGNANCY   Current Age: 27 year old   Age at Delivery: 28 year old  CHRISTIANE: 2023, by Ultrasound                                   Gestational Age: 13w6d  This pregnancy is a single gestation.     MEDICAL HISTORY   Mi ko reported medical history is not expected to impact pregnancy management or risks to fetal development.       FAMILY HISTORY   A three-generation pedigree was obtained today and is scanned under the \"Media\" tab in Epic. The family history was reported by Mi.    The following significant findings were reported today:     Mi shared that her sister had a 19 week stillbirth related to placental detaching.  Mi's " maternal half sister has Asperger's syndrome. Some autism spectrum disorders can be associated with specific genetic conditions, but most often is due to the combination of genes and environmental factors that can affect development.  Because there is a genetic component to autism spectrum disorders, the recurrence risk for other family members is higher among first-degree relatives of the individual with an autism spectrum disorder (full siblings), and decreases with distance in relationship to other second-degree relatives.    Otherwise, the reported family history is unremarkable for multiple miscarriages, stillbirths, birth defects, intellectual disabilities, known genetic conditions, and consanguinity.       CARRIER SCREENING   Expanded carrier screening is available to screen for autosomal recessive conditions and X-linked conditions in a large list of genes. Autosomal recessive conditions happen when a mutation has been inherited from the egg and sperm and include conditions like cystic fibrosis, thalassemia, hearing loss, spinal muscular atrophy, and more. X-linked conditions happen when a mutation has been inherited from the egg and include conditions like fragile X syndrome. [place] screening was also reviewed. About [place] Screening  The patient has declined the carrier screening options today. They are aware the option will remain, and they can contact us if they would like to pursue screening.       RISK ASSESSMENT FOR CHROMOSOME CONDITIONS   We explained that the risk for fetal chromosome abnormalities increases with maternal age. We discussed specific features of common chromosome abnormalities, including Down syndrome, trisomy 13, trisomy 18, and sex chromosome trisomies.      At age 28 at delivery, the midtrimester risk to have a baby with Down syndrome is 1 in 928.     At age 28 at delivery, the midtrimester risk to have a baby with any chromosome abnormality is 1 in 464.     Mi has not  had genetic screening in this pregnancy but elected to have screening today.      GENETIC TESTING OPTIONS   Genetic testing during a pregnancy includes screening and diagnostic procedures.      Screening tests are non-invasive which means no risk to the pregnancy and includes ultrasounds and blood work. The benefits and limitations of screening were reviewed. Screening tests provide a risk assessment (chance) specific to the pregnancy for certain fetal chromosome abnormalities but cannot definitively diagnose or exclude a fetal chromosome abnormality. Follow-up genetic counseling and consideration of diagnostic testing is recommended with any abnormal screening result. Diagnostic testing during a pregnancy is more certain and can test for more conditions. However, the tests do have a risk of miscarriage that requires careful consideration. These tests can detect fetal chromosome abnormalities with greater than 99% certainty. Results can be compromised by maternal cell contamination or mosaicism and are limited by the resolution of current genetic testing technology.     There is no screening or diagnostic test that detects all forms of birth defects or intellectual disability.     We discussed the following screening options:   First trimester screening    Ultrasound between [id number] that includes nuchal translucency measurement and nasal bone assessments    Nuchal translucency refers to the space at the back of the neck where fluid builds up. All babies at this stage have fluid and there is only concern if there is too much fluid    Nasal bone refers to the small bone in the nose. There is concern for conditions like Down syndrome if the bone cannot be seen at all    Blood work from arm for levels of hCG and KYRA-A    Screens for trisomy 21 and trisomy 18    Cannot screen for open neural tube defects, maternal serum AFP after 15 weeks is recommended    Non-invasive prenatal testing (NIPT)    Also called  cell-free DNA screening because it detects chromosomes from the placenta in the pregnant person's blood    Can be done any time after 10 weeks gestation    Screens for trisomy 21, trisomy 18, trisomy 13, and sex chromosome aneuploidies    Cannot screen for open neural tube defects, maternal serum AFP after 15 weeks is recommended      We discussed the following ultrasound options:  Nuchal translucency (NT) ultrasound    Ultrasound between [id number] that includes nuchal translucency measurement and nasal bone assessments    Nuchal translucency refers to the space at the back of the neck where fluid builds up. All babies at this stage have fluid and there is only concern if there is too much fluid    Nasal bone refers to the small bone in the nose. There is concern for conditions like Down syndrome if the bone cannot be seen at all    This ultrasound can be done as part of first trimester screening, at the same time as another screen (NIPT), at the same time as a CVS, or if the patients does not want genetic screening.    Markers on ultrasound detects about 70% of pregnancies with aneuploidy    Abnormalities on NT ultrasound can also increase the risk for a birth defect, like a heart defect    Comprehensive level II ultrasound (Fetal Anatomy Ultrasound)    Ultrasound done between 18-20 weeks gestation    Screens for major birth defects and markers for aneuploidy (like trisomy 21 and trisomy 18)    Includes looking at the fetus/baby's growth, heart, organs (stomach, kidneys), placenta, and amniotic fluid      We discussed the following diagnostic options:   Chorionic villus sampling (CVS)    Invasive diagnostic procedure done between 10w0d and 13w6d    The procedure collects a small sample from the placenta for the purpose of chromosomal testing and/or other genetic testing    Diagnostic result; more than 99% sensitivity for fetal chromosome abnormalities    Cannot screen for open neural tube defects, maternal serum  AFP after 15 weeks is recommended    Amniocentesis    Invasive diagnostic procedure done after 15 weeks gestation    The procedure collects a small sample of amniotic fluid for the purpose of chromosomal testing and/or other genetic testing    Diagnostic result; more than 99% sensitivity for fetal chromosome abnormalities    Testing for AFP in the amniotic fluid can test for open neural tube defects        It was a pleasure to be involved with Mi s care. Face-to-face time of the meeting was 20 minutes.    Evelyn Guidry, WILLIAM, MS, [facility]  Certified and [place] Licensed Genetic Counselor  [facility]  Maternal Fetal Medicine  Office: [phone]  [facility]: [phone]   Fax: [phone]  [facility]

## 2023-05-24 NOTE — PROGRESS NOTES
Please refer to ultrasound report under 'Imaging' Studies of 'Chart Review' tabs.    Giuliano Molina M.D.

## 2023-05-30 ENCOUNTER — TELEPHONE (OUTPATIENT)
Dept: MATERNAL FETAL MEDICINE | Facility: CLINIC | Age: 28
End: 2023-05-30
Payer: COMMERCIAL

## 2023-05-30 DIAGNOSIS — O13.9 GESTATIONAL HYPERTENSION, ANTEPARTUM: Primary | ICD-10-CM

## 2023-05-30 LAB — SCANNED LAB RESULT: NORMAL

## 2023-05-30 NOTE — TELEPHONE ENCOUNTER
JOSE MANUEL Stark regarding low risk NIPT results. Results indicate a low risk for fetal aneuploidy involving chromosomes 21, 18, 13, or sex chromosomes (XX). Fetal sex (female) was disclosed per patient wishes. This puts her current pregnancy at low risk for Down syndrome, trisomy 18, trisomy 13 and sex chromosome abnormalities. Although these results are reassuring, this does not replace a standard chromosome analysis from a chorionic villus sampling or amniocentesis.     Plan: Level II ultrasound is scheduled on 6/22/2023. MSAFP is the appropriate second trimester screening test for open neural tube defects; the maternal quad screen is not recommended. Her results are available in her Epic chart for her primary OB to review.    Evelyn Guidry MS, [facility]  Maternal Fetal Medicine  [phone]

## 2023-06-10 ENCOUNTER — NURSE TRIAGE (OUTPATIENT)
Dept: NURSING | Facility: CLINIC | Age: 28
End: 2023-06-10
Payer: COMMERCIAL

## 2023-06-10 ENCOUNTER — TELEPHONE (OUTPATIENT)
Dept: OBGYN | Facility: CLINIC | Age: 28
End: 2023-06-10
Payer: COMMERCIAL

## 2023-06-10 DIAGNOSIS — O21.9 NAUSEA AND VOMITING IN PREGNANCY: ICD-10-CM

## 2023-06-10 RX ORDER — ONDANSETRON 4 MG/1
4 TABLET, ORALLY DISINTEGRATING ORAL EVERY 6 HOURS PRN
Qty: 30 TABLET | Refills: 1 | Status: SHIPPED | OUTPATIENT
Start: 2023-06-10 | End: 2023-08-21

## 2023-06-10 NOTE — TELEPHONE ENCOUNTER
Received a message from Jeannette, nurse, who reports Mi called with complaint of vomiting 3-4 times in the last 8 hours, able to keep fluids down, but vomiting any solids. Requesting a refill of her Zofran Rx. This was done, RN will review other measures to mitigate symptoms and warning signs.       BABITA Wilkinson, SANCHEZM

## 2023-06-10 NOTE — TELEPHONE ENCOUNTER
"OB Triage Call    16.5 weeks pregnant with c/o nausea and vomiting since last night.    In last 8 hours has vomited 3-4 times. Has been able to keep liquids down this morning. Some dizziness with positional changes that last about 30 seconds. No other symptoms.    Care advice given, patient upset with home care disposition and just wanting a rx for zofran to help with her nausea. She states nothing else works. Patient expressed frustration and states she made this request over the weekend before, and requesting I page to get medication.     midwife Steph Mckeon through  [facility]    1st page redirected to different number  2nd page at [id number] to On-call provider name: Steph Mckeon Zofran PRN every 6 hours, at risk for constipation should increase fluids, fiber, and use stool softener as needed.    Called patient back at 1212 to relay provider's response. Patient verbalized understanding.    Jeannette Boswell RN on 6/10/2023 at 12:13 PM    Reason for Disposition    MILD-MODERATE vomiting (e.g., 1-5 times / day) or nausea    Additional Information    Negative: Sounds like a life-threatening emergency to the triager    Negative: [1] Vomiting AND [2] contains red blood or black (\"coffee ground\") material  (Exception: few red streaks in vomit that only happened once)    Negative: [1] Insulin-dependent diabetes (Type I) AND [2] glucose > 400 mg/dl (22 mmol/l)    Negative: Recent head injury (within last 3 days)    Negative: Recent abdominal injury (within last 3 days)    Negative: Severe pain in one eye    Negative: [1] SEVERE vomiting (e.g., 6 or more times/day) AND [2] present > 8 hours    Negative: [1] Drinking very little AND [2] dehydration suspected (e.g., no urine > 12 hours, very dry mouth, very lightheaded)    Negative: Patient sounds very sick or weak to the triager    Negative: [1] Unable to keep ANY liquids down (without vomiting) AND [2] present > 24 hours    Negative: " Weight loss > 5 pounds (2.5 kg) in last 2 weeks    Negative: Vomiting an essential or prescribed medication (Exception: prenatal vitamins)    Negative: Recently started on a new medication    Negative: [1] MODERATE vomiting (e.g., 3-5 times / day) AND [2] present > 3 days    Negative: Pain or burning with passing urine (urination)    Negative: Substance use (drug use) or unhealthy alcohol use, known or suspected    Negative: High-risk adult (e.g., diabetes mellitus, AIDS/HIV)    Negative: [1] MILD vomiting (e.g., 1-2 times / day) AND [2] present > 3 days AND [3] started after the 9th week of pregnancy    Negative: [1] MILD vomiting AND [2] present > 1 week AND [3] no improvement after using Morning Sickness Care Advice    Protocols used: PREGNANCY - MORNING SICKNESS (NAUSEA AND VOMITING OF PREGNANCY)-A-AH

## 2023-06-22 ENCOUNTER — OFFICE VISIT (OUTPATIENT)
Dept: MATERNAL FETAL MEDICINE | Facility: CLINIC | Age: 28
End: 2023-06-22
Attending: ADVANCED PRACTICE MIDWIFE
Payer: COMMERCIAL

## 2023-06-22 ENCOUNTER — HOSPITAL ENCOUNTER (OUTPATIENT)
Dept: ULTRASOUND IMAGING | Facility: CLINIC | Age: 28
Discharge: HOME | End: 2023-06-22
Attending: ADVANCED PRACTICE MIDWIFE
Payer: COMMERCIAL

## 2023-06-22 DIAGNOSIS — O99.210 OBESITY IN PREGNANCY, ANTEPARTUM: Primary | ICD-10-CM

## 2023-06-22 DIAGNOSIS — O26.90 PREGNANCY RELATED CONDITION, ANTEPARTUM: ICD-10-CM

## 2023-06-22 PROCEDURE — 76811 OB US DETAILED SNGL FETUS: CPT | Mod: 26 | Performed by: OBSTETRICS & GYNECOLOGY

## 2023-06-22 PROCEDURE — 76811 OB US DETAILED SNGL FETUS: CPT

## 2023-06-22 PROCEDURE — 99213 OFFICE O/P EST LOW 20 MIN: CPT | Mod: 25 | Performed by: OBSTETRICS & GYNECOLOGY

## 2023-06-22 NOTE — NURSING NOTE
Patient reports positive fetal movement, no pain, no contractions, leaking of fluid, or bleeding.  SBAR given to BHARATHI BAR, see their note in Epic.

## 2023-06-22 NOTE — PROGRESS NOTES
The patient was seen for an ultrasound in the [facility]etal Medicine Center at the [facility] today.  For a detailed report of the ultrasound examination, please see the ultrasound report which can be found under the imaging tab.    If you have questions regarding today's evaluation or if we can be of further service, please contact the Maternal-Fetal Medicine Center.    Iqra Quezada MD  , OB/GYN  Maternal-Fetal Medicine  [phone] (Pager)

## 2023-06-29 ENCOUNTER — TELEPHONE (OUTPATIENT)
Dept: OBGYN | Facility: CLINIC | Age: 28
End: 2023-06-29
Payer: COMMERCIAL

## 2023-06-29 DIAGNOSIS — B37.31 YEAST VAGINITIS: Primary | ICD-10-CM

## 2023-06-29 RX ORDER — TERCONAZOLE 0.4 %
1 CREAM WITH APPLICATOR VAGINAL AT BEDTIME
Qty: 45 G | Refills: 0 | Status: SHIPPED | OUTPATIENT
Start: 2023-06-29 | End: 2023-08-21

## 2023-06-29 NOTE — TELEPHONE ENCOUNTER
Received call from patient. Patient reports she is taking abx for tooth infection and is experiencing symptoms of a yeast infection. She reports internal pruritis, vulvar burning and clumpy white discharge. Terconazole ordered per protocol, encouraged patient to call if symptoms not improving within 3 days. Patient verbalized understanding.

## 2023-07-14 ENCOUNTER — OFFICE VISIT (OUTPATIENT)
Dept: MATERNAL FETAL MEDICINE | Facility: CLINIC | Age: 28
End: 2023-07-14
Attending: OBSTETRICS & GYNECOLOGY
Payer: COMMERCIAL

## 2023-07-14 ENCOUNTER — HOSPITAL ENCOUNTER (OUTPATIENT)
Dept: ULTRASOUND IMAGING | Facility: CLINIC | Age: 28
Discharge: HOME | End: 2023-07-14
Attending: OBSTETRICS & GYNECOLOGY
Payer: COMMERCIAL

## 2023-07-14 DIAGNOSIS — O26.90 PREGNANCY RELATED CONDITION, ANTEPARTUM: Primary | ICD-10-CM

## 2023-07-14 PROCEDURE — 76816 OB US FOLLOW-UP PER FETUS: CPT

## 2023-07-14 PROCEDURE — 76816 OB US FOLLOW-UP PER FETUS: CPT | Mod: 26 | Performed by: OBSTETRICS & GYNECOLOGY

## 2023-07-14 NOTE — PROGRESS NOTES
"Please see \"Imaging\" tab under \"Chart Review\" for details of today's visit.    Joelle Ellsworth MD PhD  Maternal Fetal Medicine     "

## 2023-07-21 PROBLEM — O99.820 GROUP B STREPTOCOCCAL CARRIAGE COMPLICATING PREGNANCY: Status: ACTIVE | Noted: 2022-06-30

## 2023-07-21 PROBLEM — E66.3 OVERWEIGHT WITH BODY MASS INDEX (BMI) 25.0-29.9: Status: ACTIVE | Noted: 2023-07-21

## 2023-07-21 PROBLEM — O09.891 SHORT INTERVAL BETWEEN PREGNANCIES AFFECTING PREGNANCY IN FIRST TRIMESTER, ANTEPARTUM: Status: RESOLVED | Noted: 2023-04-18 | Resolved: 2023-07-21

## 2023-07-21 PROBLEM — A60.9 ANOGENITAL HERPESVIRAL INFECTION: Status: ACTIVE | Noted: 2023-07-21

## 2023-08-07 NOTE — LETTER
5/13/2022         RE: Mi Salgado  [address] [place] [id number]        Dear Colleague,    Thank you for referring your patient, Mi Salgado, to the [facility]. Please see a copy of my visit note below.    Chief Complaint:   Chief Complaint   Patient presents with     Left Wrist - Pain     Patient had been experiencing wrist numbness prior to the pain. Left wrist pain that started about 1.5 months ago. No known Injury. Pain is sharp, aching, constant. Movement and writing makes wrist pain worse. States nothing makes it feel better. She had tried a wrist brace that didn't help because it makes her wrist go numb. She has tried tylenol without relief. Most pain when bending her wrist. Patient has not had any prior imaging since she is currently pregnant.         HPI: Mi Salgado is a 26 year old female , left -hand dominant, who presents for evaluation and management of a right wrist pain, no known injury. Pain has been present for 1.5 months. Locates pain along the side of the wrist (radial), getting worse. Aggravated with activities.    Started numbness and tingling with pregnancy in both hands, all fingers, now almost 8 months pregnant. Numbness and tingling started well before the wrist pain.    Wrist brace makes hands more numb. Better with shaking the hands.      She reports having moderate  pain/discomfort around the wrist site.   Symptoms: pain left wrist, numbness and tingling bilateral hands.  Location of symptoms: radial wrist.  Pain severity: 7/10  Pain quality: aching and sharp  Frequency of symptoms: are constant  Aggravating factors: activities, writing.  Relieving factors: shaking hands.    Previous treatment: ice, tylenol, brace    Past medical history:  has a past medical history of ADHD (attention deficit hyperactivity disorder) (09/20/2012), Anxiety (12/18/2017), Chlamydia (2013), Chronic post-traumatic stress disorder (PTSD) (09/13/2018),  Primary Care Physician: Latoya Brunner APRN - CNP   Attending Physician: Tyrone Madrigal MD     History   Chief Complaint   Patient presents with    Fall     Patient arrives to ED from [facility]. Patient fell at home and hit his head on a CD rack. Patient has been feeling dizziness since this morning. Patient is unsure how long he lost consciousness for, all he recalls is his wife waking him up. ANG Sena  is a 73 y.o. male history of congestive heart failure, CVA diabetes hypertension hyperlipidemia who presents after a fall. Patient stated that he was at home when he felt dizzy. He did hit his head on the CT route. Stated he was dizzy this morning. Unsure how long he was down. Denies any chest pain or shortness of breath.     Past Medical History:   Diagnosis Date    Cerebrovascular disease     CHF (congestive heart failure) ([address]) 2017    per  nurse report but wife is unaware    DM (diabetes mellitus screen) 2017    boarder line    Hyperlipidemia     Hypertension     Type 2 diabetes mellitus without complication (HCC)         Past Surgical History:   Procedure Laterality Date    VASECTOMY          Family History   Problem Relation Age of Onset    High Blood Pressure Mother     Cancer Mother         lung, smoker    Cancer Father         mesothelioma    Depression Sister         Social History     Socioeconomic History    Marital status:      Spouse name: None    Number of children: 3    Years of education: None    Highest education level: None   Tobacco Use    Smoking status: Former     Packs/day: 2.00     Years: 40.00     Pack years: 80.00     Types: Cigarettes     Quit date: 2017     Years since quittin.2    Smokeless tobacco: Former     Quit date: 2017   Vaping Use    Vaping Use: Never used   Substance and Sexual Activity    Alcohol use: No     Comment: quit , history of alcoholism    Drug use: No     Social Determinants of Health     Financial Cluster B personality disorder (H) (12/29/2011), Genital HSV (2015), Gonorrhea (2013), Oppositional defiant disorder of childhood or adolescence, and PCOS (polycystic ovarian syndrome) (9/27/2017).    She has no past medical history of Arthritis, Asthma, Blood transfusion, Congestive heart failure, unspecified, COPD (chronic obstructive pulmonary disease) (H), Coronary artery disease, Diabetes mellitus (H), History of blood transfusion, Hypertension, Malignant neoplasm (H), Thyroid disease, or Unspecified cerebral artery occlusion with cerebral infarction.     Patient Active Problem List    Diagnosis Date Noted     Supervision of normal first pregnancy, antepartum 11/24/2021     Priority: Medium     Boy.  NIPT neg.        PCOS (polycystic ovarian syndrome) 09/27/2017     Priority: Medium     Past surgical history:  has a past surgical history that includes no history of surgery.     Medications:    Current Outpatient Medications   Medication Sig Dispense Refill     Prenatal Vit-Fe Fumarate-FA (PRENATAL MULTIVITAMIN W/IRON) 27-0.8 MG tablet Take 1 tablet by mouth daily 90 tablet 3        Allergies:     Allergies   Allergen Reactions     Latex Rash        Family History: family history includes Depression in an other family member; No Known Problems in her father, maternal grandfather, maternal grandmother, mother, sister, and sister; Stillborn in her sister.     Social History:  reports that she quit smoking about 17 months ago. Her smoking use included cigarettes. She smoked 0.25 packs per day. She has never used smokeless tobacco. She reports previous drug use. Drugs: Marijuana and Amphetamines. She reports that she does not drink alcohol.    Review of Systems:  ROS: 10 point ROS neg other than the symptoms noted above in the HPI and past medical history.    Physical Exam  GENERAL APPEARANCE: healthy, alert, no distress.   SKIN: no suspicious lesions or rashes  NEURO: Normal strength and tone, mentation intact and  speech normal  PSYCH:  mentation appears normal and affect normal. Not anxious.  RESPIRATORY: No increased work of breathing.    /79 (BP Location: Right arm, Patient Position: Sitting, Cuff Size: Adult Large)   Pulse 104   LMP 09/27/2021 (Exact Date)   SpO2 95%      left HAND / WRIST EXAM:    Skin intact. No abnormal skin discoloration, erythema or ecchymosis.   Normal wear pattern, color and tone.  No observable or palpable masses of the fingers or palm or wrist.  No palpable triggering of fingers.   No observable or palpable cords or nodules of the fingers or palm.    There is mild swelling in the 1st dorsal compartment of the wrist.  There is severe tenderness in the 1st dorsal compartment of the wrist.  There is no ecchymosis.  There is no erythema of the surrounding skin.  There is no maceration of the skin.  There is no deformity in the area.  Intact extensors. No extensor lag.    Special tests wrist:    Tinel's equivocal,    Phalen's positive.    Flexion/compression test positive.   Finkelstein's test: positive.   Ulnar piano sign: negative   Ulnar foveal tenderness:  negative    Special tests medial elbow ulnar nerve:    Tinel's negative,    Flexion/compression test negative.    Special tests median nerve proximal forearm:    Tinel's negative.    1st carpometacarpal grind: negative    Intact sensation light touch median, radial, ulnar nerves of the hand  Intact sensation to the radial and ulnar digital nerves of the fingers, as well as the finger tips.  Intact epl fpl fdp edc wrist flexion/extension biceps/triceps deltoid  Brisk capillary refill to all fingers.   Palpable radial pulse, 2+.    RIGHT HAND / WRIST EXAM:    Skin intact. No abnormal skin discoloration, erythema or ecchymosis.   Normal wear pattern, color and tone.  No observable or palpable masses of the fingers or palm or wrist.  No palpable triggering of fingers.   No observable or palpable cords or nodules of the fingers or  palm.    There is no  There is no tenderness in the wrist  There is no ecchymosis.  There is no erythema of the surrounding skin.  There is no maceration of the skin.  There is no deformity in the area.  Intact extensors. No extensor lag.    Special tests wrist:    Tinel's equivocal,    Phalen's positive.    Flexion/compression test positive.   Finkelstein's test: negative.   Ulnar piano sign: negative   Ulnar foveal tenderness:  negative    Special tests medial elbow ulnar nerve:    Tinel's negative,    Flexion/compression test negative.    Special tests median nerve proximal forearm:    Tinel's negative.    1st carpometacarpal grind: negative    Intact sensation light touch median, radial, ulnar nerves of the hand  Intact sensation to the radial and ulnar digital nerves of the fingers, as well as the finger tips.  Intact epl fpl fdp edc wrist flexion/extension biceps/triceps deltoid  Brisk capillary refill to all fingers.   Palpable radial pulse, 2+.        X-rays: none today given pregnancy.    Assessment: 27yo LHD female with:  1) left wrist dequervains tenosynovitis  2) bilateral gestational carpal tunnel syndrome .    Plan:  * carpal tunnel syndrome likely will resolve with having the baby, but will monitor. Continue braces at night.    * for the wrist pain, consistent with tendinitis.  Nonoperative treatment. Expect improvement in most cases 6-8 weeks.  * Rest. Immobilization in removable thumb spica splints.   * will refer to [facility] for thumb based handsplints, as well as treatment modalities as indicated.  * Activity modification - avoid activities that aggravate symptoms.  * NSAIDS - hold off given pregnancy. Discuss with primary care provider appropriate analgesics, like acetaminophen.  * Ice twice daily to three times daily.  * Tylenol as needed for pain  * Injections: discussed injections, low risk during pregnancy but some risk is possible. Patient states her primary sent her here  for an injection so is ok. She understands the risks and willing to proceed with injection.  * Return to clinic as needed.            Silvano Mathews M.D., M.S.  Dept. of Orthopaedic Surgery  [facility]    Hand / Upper Extremity Injection/Arthrocentesis: L extensor compartment 1    Date/Time: 5/13/2022 11:12 AM  Performed by: Silvano Mathews MD  Authorized by: Silvano Mathews MD     Indications:  Pain  Needle Size:  25 G  Guidance: landmark    Condition: de Quervain's      Site:  L extensor compartment 1  Medications:  20 mg triamcinolone 40 MG/ML  Outcome:  Tolerated well, no immediate complications  Procedure discussed: discussed risks, benefits, and alternatives    Consent Given by:  Patient  Timeout: timeout called immediately prior to procedure    Prep: patient was prepped and draped in usual sterile fashion     0.5mL 0.25% Bupivacaine 125 mg per mL    NDC: 49242-203-67  LOT: [id number]  EXP: 05-30-23              Again, thank you for allowing me to participate in the care of your patient.        Sincerely,        Silvano Mathews MD

## 2023-08-18 ENCOUNTER — TELEPHONE (OUTPATIENT)
Dept: OBGYN | Facility: CLINIC | Age: 28
End: 2023-08-18
Payer: COMMERCIAL

## 2023-08-18 NOTE — PATIENT INSTRUCTIONS
You have been provided the My Labor and Birth Wishes document.  Please review at home and bring to your next prenatal visit. Bring this sheet to the hospital for your birth. Give copies to your care team members and support person.   Additional copies can be found here:  [email or web address]    Thank you for trusting us with your care!     If you need to contact us for questions about:  Symptoms, Scheduling & Medical Questions; Non-urgent (2-3 day response) Elastra message, Urgent (needing response today) [phone] (if after 3:30pm next day response)   Prescriptions: Please call your Pharmacy   Billing: [place] [phone] or  Physicians:[phone]

## 2023-08-18 NOTE — TELEPHONE ENCOUNTER
"Mi is calling to report she is feeling \"stressed and overwhelmed\" and feels she is working \"too much and too hard and needs a few days off to sleep and recover.\" She reports she started a new job recently and the onboarding process has been \"very busy and overwhelming.\" Mi is requesting the [facility] team write her a letter to allow her to take work off Friday (8/18) and Saturday (8/19). She would like the letter to state she has no restrictions when returning to work on Sunday. Mi does have an appointment scheduled with [facility] Steph Pillai on Monday and will further discuss her concerns at the appointment.    I informed Mi this request is typically completed through [facility] paperwork. Mi states she just started a new job and hasn't been able to request or complete any [place] paperwork and \"really needs the next two days off to get back on my feet.\"    Routing request to midwife pool.  "

## 2023-08-20 PROBLEM — O44.00 PLACENTA PREVIA: Status: RESOLVED | Noted: 2022-03-02 | Resolved: 2023-08-20

## 2023-08-21 ENCOUNTER — PRENATAL OFFICE VISIT (OUTPATIENT)
Dept: OBGYN | Facility: CLINIC | Age: 28
End: 2023-08-21
Attending: ADVANCED PRACTICE MIDWIFE
Payer: COMMERCIAL

## 2023-08-21 VITALS
BODY MASS INDEX: 36.12 KG/M2 | DIASTOLIC BLOOD PRESSURE: 71 MMHG | HEART RATE: 82 BPM | SYSTOLIC BLOOD PRESSURE: 104 MMHG | WEIGHT: 223.7 LBS

## 2023-08-21 DIAGNOSIS — O09.91 SUPERVISION OF HIGH RISK PREGNANCY IN FIRST TRIMESTER: ICD-10-CM

## 2023-08-21 DIAGNOSIS — Z98.891 HISTORY OF CESAREAN DELIVERY: ICD-10-CM

## 2023-08-21 DIAGNOSIS — O99.820 GROUP B STREPTOCOCCAL CARRIAGE COMPLICATING PREGNANCY: Primary | ICD-10-CM

## 2023-08-21 DIAGNOSIS — G56.01 CARPAL TUNNEL SYNDROME OF RIGHT WRIST: ICD-10-CM

## 2023-08-21 DIAGNOSIS — E66.811 CLASS 1 OBESITY DUE TO EXCESS CALORIES WITHOUT SERIOUS COMORBIDITY WITH BODY MASS INDEX (BMI) OF 33.0 TO 33.9 IN ADULT: ICD-10-CM

## 2023-08-21 DIAGNOSIS — E66.09 CLASS 1 OBESITY DUE TO EXCESS CALORIES WITHOUT SERIOUS COMORBIDITY WITH BODY MASS INDEX (BMI) OF 33.0 TO 33.9 IN ADULT: ICD-10-CM

## 2023-08-21 PROCEDURE — G0463 HOSPITAL OUTPT CLINIC VISIT: HCPCS | Performed by: ADVANCED PRACTICE MIDWIFE

## 2023-08-21 PROCEDURE — 99207 PR PRENATAL VISIT: CPT | Performed by: ADVANCED PRACTICE MIDWIFE

## 2023-08-21 RX ORDER — POLYETHYLENE GLYCOL 3350 17 G
2 POWDER IN PACKET (EA) ORAL
Qty: 30 LOZENGE | Refills: 0 | Status: SHIPPED | OUTPATIENT
Start: 2023-08-21 | End: 2023-09-13

## 2023-08-21 ASSESSMENT — PAIN SCALES - GENERAL: PAINLEVEL: SEVERE PAIN (6)

## 2023-08-21 NOTE — PROGRESS NOTES
" 28 year old, , 26w4d,   Early GCT was recommended but she declined at her NOB visit at 13 weeks because her baby was with her. She has not completed this, will plan GCT this week Wednesday. She has had a hard time coming in because she always has Aníbal. Finn is off work this week so she thinks she should be able to come Wednesday for the GCT. Will also order vitamin D, CBC, syphilis screen as well  NIPT negative, girl!!  Hx of CS 22 at 39/3 weeks, 8lbs 6.4 oz, boy, \"Aníbal\" arrest of dilation at 8cm/90%/-1, gestational hypertension  BMI 42, transfer from [place] due to GHTN in labor. Started her care at [facility] until 30 weeks  Short interpregnancy interval of 18 months  Desires , Reviewed TOLAC consent today    Mi has not established care with a mental health provider for her depression. [facility] was referred to [facility] but the intake paperwork was so long it overwhelmed her and she canceled her appointment.  She is interested in establishing care with Jocelyn Damon       Mi is also looking for parenting classes - she feels like she has a hard time knowing how to manage Aníbal's big emotions.     Has weaned off cigarettes, now just vapes occasionally. Interested in nicotine lozenges    Reviewed Level II US: 23: transverse, anterior placenta, no previa, EFW 77%, AC 74%, suboptimal view of heart. Cervix long and closed.   Follow up US: 23: 21/1 day, cephalic, EFW 56%, AC 46%, f/u views normal  Continues to go to the gym and eat well.     Complains of Right carpal tunnel pain, she has done steroid injections in the past, but is not sure she is at the point where she needs that now. Interested in ibuprofen cream          2021     1:10 PM 8/10/2022    10:20 AM 2023     3:21 PM   PHQ-9 SCORE   PHQ-9 Total Score 4 0 2     Education completed today includes 28 week labs.  Birth preferences reviewed:  considering epidural this time  The following " labs were ordered today:       GCT, CBC w platelets, Vitamin D, and Anti-treponema  TOLAC consent reviewed and signed    Blood type:   Antibody Screen   Date Value Ref Range Status   2023 Negative Negative Final   Rhogam  was not given.  TDAP  was not given.    A/P:  Encounter Diagnoses   Name Primary?    History of  delivery     Group B streptococcal carriage complicating pregnancy Yes    BMI 34     Supervision of high risk pregnancy in first trimester     Carpal tunnel syndrome of right wrist      Orders Placed This Encounter   Procedures    CBC with platelets    Treponema Abs w Reflex to RPR and Titer    Vitamin D Deficiency     Orders Placed This Encounter   Medications    nicotine (COMMIT) 2 MG lozenge     Sig: Place 1 lozenge (2 mg) inside cheek every hour as needed for smoking cessation     Dispense:  30 lozenge     Refill:  0    diclofenac (VOLTAREN) 1 % topical gel     Sig: Apply 2 g topically 4 times daily     Dispense:  50 g     Refill:  0   Plan:   -diclofenac cream prescribed for right carpal tunnel pain  -Nicotine lozenges prescribed to help Mi wean off vaping.  -Both vaginal delivery and  delivery risks and benefits were discussed with Mi.    -Reviewed Mi's delivery history and how it can impact this delivery and chance for success for a .  Discussed that her chance at successful vaginal birth may be higher given that she has lost so much weight since her first pregnancy and worked hard and being healthier.   Reviewed the 1% risk of uterine rupture with either method of delivery, just different risks with each.    -Discussed that the risk involved with a  section is higher for someone who has labored first.     -If she would like to have a TOLAC, advised that she come to the hospital sooner than if she hadn't had surgery in the past.    -Ideally we will avoid an induction of labor, but if this is indicated because of gestational age or a medical  issue, Pitocin can be used. This would increase the risk of uterine rupture to about 1.9%.    -Recovery with vaginal delivery is usually easier than with  section and usually has a shorter stay in the hospital.    -Epidural Anesthesia is still an option if she is attempting a .   -Reviewed benefits to the baby with a vaginal delivery.    -Reviewed risks of surgery, including infection, bleeding, and injury to adjacent organs. Discussed that this risk increases as the number of surgeries increases.    -During her labor process she can change her mind if she decides she wants a repeat  section.    -Reviewed expected hospital stay and recovery limitations following a  section, especially with lifting and driving.    -Baby's born by  have a slightly increased risk of transient tachypnea of the . This often resolves within 1-2 days.  -Advised that a scheduled  should be no earlier than 39 weeks gestation unless there is a medical issue that would make delivery earlier recommended.    - consent was signed after all questions were answered.    Continue scheduled prenatal care  Steph Pillai CNM

## 2023-08-24 ENCOUNTER — LAB (OUTPATIENT)
Dept: LAB | Facility: CLINIC | Age: 28
End: 2023-08-24
Payer: COMMERCIAL

## 2023-08-24 ENCOUNTER — TELEPHONE (OUTPATIENT)
Dept: OBGYN | Facility: CLINIC | Age: 28
End: 2023-08-24

## 2023-08-24 DIAGNOSIS — O09.91 SUPERVISION OF HIGH RISK PREGNANCY IN FIRST TRIMESTER: ICD-10-CM

## 2023-08-24 LAB
DEPRECATED CALCIDIOL+CALCIFEROL SERPL-MC: 49 UG/L (ref 20–75)
ERYTHROCYTE [DISTWIDTH] IN BLOOD BY AUTOMATED COUNT: 13.1 % (ref 10–15)
GLUCOSE 1H P 50 G GLC PO SERPL-MCNC: 113 MG/DL (ref 70–129)
HCT VFR BLD AUTO: 36.8 % (ref 35–47)
HGB BLD-MCNC: 13.2 G/DL (ref 11.7–15.7)
MCH RBC QN AUTO: 31.9 PG (ref 26.5–33)
MCHC RBC AUTO-ENTMCNC: 35.9 G/DL (ref 31.5–36.5)
MCV RBC AUTO: 89 FL (ref 78–100)
PLATELET # BLD AUTO: 182 10E3/UL (ref 150–450)
RBC # BLD AUTO: 4.14 10E6/UL (ref 3.8–5.2)
T PALLIDUM AB SER QL: NONREACTIVE
WBC # BLD AUTO: 12.1 10E3/UL (ref 4–11)

## 2023-08-24 PROCEDURE — 82950 GLUCOSE TEST: CPT

## 2023-08-24 PROCEDURE — 82306 VITAMIN D 25 HYDROXY: CPT

## 2023-08-24 PROCEDURE — 36415 COLL VENOUS BLD VENIPUNCTURE: CPT

## 2023-08-24 PROCEDURE — 85027 COMPLETE CBC AUTOMATED: CPT

## 2023-08-24 PROCEDURE — 86780 TREPONEMA PALLIDUM: CPT

## 2023-08-24 NOTE — TELEPHONE ENCOUNTER
Patient called stating she needs a verification of pregnancy letter sent to her insurance company. Letter written and faxed.    Fax: [phone]

## 2023-08-24 NOTE — LETTER
August 24, 2023    To Whom It May Concern:    Mi Salgado is being seen in our clinic for prenatal care.      Her Estimated Date of Delivery: Nov 23, 2023.    The first day the third trimester: August 31, 2023    LMP:  Patient's last menstrual period was 02/10/2023.     Case # [id number]    Sincerely,        Alise Mendez RN sent on behalf of  Steph Varner CNM

## 2023-09-11 ENCOUNTER — TELEPHONE (OUTPATIENT)
Dept: OBGYN | Facility: CLINIC | Age: 28
End: 2023-09-11
Payer: COMMERCIAL

## 2023-09-12 PROBLEM — O99.820 GROUP B STREPTOCOCCAL CARRIAGE COMPLICATING PREGNANCY: Status: RESOLVED | Noted: 2022-06-30 | Resolved: 2023-09-12

## 2023-09-12 NOTE — PROGRESS NOTES
"Subjective:      28 year old  at 29w6d presents for a routine prenatal appointment with her son Aníbal  1 hour GCT normal 113, Hgb: 13.2, RPR, vitamin D 49  Right carpal tunnel pain, tried Diclofenac cream but it did not work very well, symptoms have improved on their own  Nicotine lozenges prescribed to help Mi wean off vaping - she did not use the lozenges, she was inspired by her friend who got sober for her pregnancy and she discontinued use completely    Mi has noticed congestion in the last few days. Asking which meds are safe to take in pregnancy    Mi is wondering if she can labor in the tub as hydrotherapy was very helpful to her with her last birth  Not sure if she will use a , her last  was not supportive, she left and didn't support her during her .   Mi is very motivated to have a ! Working hard to keep weight down, exercising regularly     No vaginal bleeding or leakage of fluid.  No contractions. Good fetal movement.       No HA, visual changes, RUQ or epigastric pain.   Patient concerns:   Feeling well overall.  Reviewed EOB labs with patient.  Reviewed TDAP  would like to do next visit  Objective:  Vitals:    23 0803   BP: 125/80   Pulse: 88   Weight: 103.4 kg (228 lb)   Height: 1.676 m (5' 6\")   , see ob flowsheet  Assessment/Plan     Encounter Diagnosis   Name Primary?    Supervision of high risk pregnancy in first trimester Yes     Orders Placed This Encounter   Procedures    US OB Biophys Single Gestation Measure    Breast Pump Order for DME - ONLY FOR DME     Orders Placed This Encounter   Medications    acyclovir (ZOVIRAX) 400 MG tablet     Sig: Take 1 tablet every 8 hours by oral route.     Antibody Screen   Date Value Ref Range Status   2023 Negative Negative Final   Rhogam  was not given.    -Given list of medications safe to take in pregnancy for cold/congestion  -Tdap next visit  - Reviewed total weight gain, encouraged " continued healthy diet and exercise.   -discussed that laboring in the tub is welcomed but that we can't inflate the large waterbirth tub for her.   -Measuring 34 cm at 30 weeks, pt also requesting an ultrasound. Growth US ordered    Reviewed importance of daily fetal kick count and why/how to contact provider.    - Reviewed why/how to contact provider if headache/visual changes/RUQ or epigastric pain, decreased fetal movement, vaginal bleeding, leakage of fluid or more than 4 contractions in an hour.     Patient education/orders or handouts today:  Childbirth Ed classes  Reviewed GBS screening at 35-36 wks.    Return to clinic in 2 weeks and prn if questions or concerns.     Steph Pillai CNM

## 2023-09-13 ENCOUNTER — PRENATAL OFFICE VISIT (OUTPATIENT)
Dept: OBGYN | Facility: CLINIC | Age: 28
End: 2023-09-13
Attending: ADVANCED PRACTICE MIDWIFE
Payer: COMMERCIAL

## 2023-09-13 VITALS
SYSTOLIC BLOOD PRESSURE: 125 MMHG | BODY MASS INDEX: 36.64 KG/M2 | WEIGHT: 228 LBS | HEART RATE: 88 BPM | HEIGHT: 66 IN | DIASTOLIC BLOOD PRESSURE: 80 MMHG

## 2023-09-13 DIAGNOSIS — O09.91 SUPERVISION OF HIGH RISK PREGNANCY IN FIRST TRIMESTER: Primary | ICD-10-CM

## 2023-09-13 PROCEDURE — 99207 PR PRENATAL VISIT: CPT | Performed by: ADVANCED PRACTICE MIDWIFE

## 2023-09-13 PROCEDURE — G0463 HOSPITAL OUTPT CLINIC VISIT: HCPCS | Performed by: ADVANCED PRACTICE MIDWIFE

## 2023-09-13 RX ORDER — ACYCLOVIR 400 MG/1
TABLET ORAL
COMMUNITY
End: 2023-10-11

## 2023-09-13 NOTE — NURSING NOTE
Chief Complaint   Patient presents with    Prenatal Care     EDMOND 29 weeks and 6 days    Corrie Kennedy LPN

## 2023-09-13 NOTE — PATIENT INSTRUCTIONS
Colds in Pregnancy                Washing hands frequently is best prevention for the common cold.  Call your midwife if:  Cough is productive with yellow or green sputum  Persistent or severe sore throat or difficulty swallowing  Fever over 100.4  if lasting over 24 hours or if one reading is 101 or higher.  Symptoms do not improve within 7-10 days    Home Remedies  Warm Salt Water: Salt-water rinsing helps break nasalcongestion, while also removing virus particles and bacteria from your nose. Here's a popular recipe:  Mix 1/4teaspoon salt and 1/4 teaspoon baking soda in 8 ounces of boiled water that has been cooled. Use a bulb syringe to squirt water into the nose. Hold one nostril closed by applying light finger pressure while squirting thesalt mixture into the other nostril. Let it drain. Repeat two to three times, then treat the other nostril. You may also use a NetiPot and let the liquid flow into one nostril and out the other. See the package forinstructions.  Stay Hyrated, Warm and Rested: with first symptoms of a cold or the flu.  Gargle: Gargling can moisten a sore throat and bring temporary relief. Gargle with half a teaspoon of salt dissolved in 8 ounces warm water, four times daily.   To reduce the tickle in your throat, try an astringent gargle -- such as tea that contains tannin -- to tighten the membranes. Or use a thick,viscous gargle made with honey or honey and apple cider vinegar. Steep one tablespoon of raspberry leaves or lemon juice in two cups of hot water; mix with one teaspoon of honey. Let the mixture cool to room temperaturebefore gargling.  Drink Hot Liquids: Hot liquids relieve nasal congestion, preventdehydration, and soothe the uncomfortably inflamed membranes that line your nose and throat. If you're so congested that you can't sleep at night, try a warm mixture of honey and lemon or honey and tea.  Takea Steamy Shower: Steamy showers moisturize your nasal and helps you relax. If  you're dizzy from the flu, run a steamy shower while you sit on a chair nearby and take a sponge bath.  Use a SalveUnder Your Nose: A small dab of mentholated salve under your nose can help to open breathing passages and restore the irritated skin at the base of the nose. Try menthol, eucalyptus, or camphor.  Hot or Cold Packs Around Your Congested Sinuses: Either temperature works. You can buy reusable hot or cold packs at a drugstore or make your own. You can apply heat by taking a damp washcloth and heatingit for 55 seconds in a microwave (test the temperature first to make sure it's not too hot.) A small bag of frozen peas works well as a cold pack.  Sleep With an Extra Pillow Under Your Head: Elevating your head will help relieve congested nasal passages. If the angle is too awkward, try placing the pillows between the mattress and the box springs to create a more gradual slope.    Medications That Appear Safe in Pregnancy   As a rule  -    Choose over the counter remedies with a long history of use. We know moreabout their effects than  newer  medications. For example, we know more about  first generation , antihistamines than we know about  second generation  (non-drowsyformulations).   Try Category B first as these medications appear safe in pregnancy whereas evidence of harm may have occurred in animal studies with Category C drugs but never inhumans.   Choose over-the-counter remedies with the fewest ingredients.   Try taking half the dose recommended initially or take the medicationonly before sleep.   Try  home remedies  before heading for the pharmacy.   Use Tylenol or Extra-Strength Tylenol in pregnancyinstead of aspirin or ibuprofen.  Try the medications below if the home remedies do not seem to relieve severe symptoms:             (use according to package instructions or may start with half dose initially)  Cetirizine (Zyrtec) Category B  Chlorpheniramine (Chlor-trimeton) or Loratadine (Claratin,  Alavert)   both Category B  Dimenhydrinate (Dramamine)  B  Diphenhydramine (Benadryl) Category B  Doxylamine (Unisom, [place] Cold) Category B  Guaifenesin (Robitussin, Mucinex) Category C  Dextromethorphan or DM (all multi-symptom cold remedies) Appears to be safe butavoid multi-ingredient remedies.  Category C  Hydroxyzine (Vistaril) or Fexofenadine (Allegra) both Category C  Pheniramine (Visine-A, Opcon-A) Category C  Pseudoephedrine (Sudafed)  NOT IN FIRST TRIMESTER  Category C  Desloratadine (Clarinex) Category C

## 2023-09-25 NOTE — PROGRESS NOTES
"Subjective:      28 year old  at 31w6d presents for a routine prenatal appointment.   GCT: 113, Hgb: 13.2, syphilis negative, vitamin D 49  Hx of HSV will plan prophylactic antiviral Valtrex at 36 weeks  Growth US, baby was measuring 34 at 30 weeks. US: scheduled tomorrow  Accepts Tdap today    Mi had two tooth fillings in March, but then developed an infection in the root of her tooth and now she needs a root canal. She will plan to do this postpartum    Mi has been feeling pelvic pressure and requesting SVE  Pelvic pressure SVE closed and long baby high    Accepts Tdap tody       Denies vaginal bleeding or leakage of fluid.  Occasional contractions. Good fetal movement.       No HA, visual changes, RUQ or epigastric pain.   Patient concerns: pelvic pressure   Feeling well overall.  Reviewed EOB labs with patient.  Reviewed TDAP Consents today  Objective:  Vitals:    23 0832   BP: 120/77   Pulse: 74   Weight: 106.1 kg (233 lb 14.4 oz)   Height: 1.676 m (5' 6\")   see ob flowsheet  SVE closed and long, baby high    Assessment/Plan     Encounter Diagnoses   Name Primary?    Supervision of high risk pregnancy in first trimester Yes    BMI 34      No orders of the defined types were placed in this encounter.    No orders of the defined types were placed in this encounter.    Antibody Screen   Date Value Ref Range Status   2023 Negative Negative Final   Rhogam  was notgiven.    -Tdap given  - Reviewed total weight gain, encouraged continued healthy diet and exercise.  .  Reviewed importance of daily fetal kick count and why/how to contact provider.    - Reviewed why/how to contact provider if headache/visual changes/RUQ or epigastric pain, decreased fetal movement, vaginal bleeding, leakage of fluid or more than 4 contractions in an hour.     Patient education/orders or handouts today:  Tdap, s/sx of PTL  Reviewed GBS screening at 35-36 wks.    Return to clinic in 2 weeks and prn if " questions or concerns.     Steph Pillai, SANCHEZM

## 2023-09-26 NOTE — PATIENT INSTRUCTIONS
"Weeks 30 to 32 of Your Pregnancy: Care Instructions  Your baby is growing more every day. Its eyes can open and close, and it may have hair on its head. Your baby may sleep 20 to 45 minutes at a time and is more active at certain times.    You should feel your baby move several times every day. Your baby now turns less and kicks more.   This is a good time to tour your hospital or birthing center. You may also want to find childcare if needed.     To ease heartburn    Avoid foods that make your symptoms worse, such as chocolate, spicy foods, and caffeine.  Avoid bending over or lying down after meals.  Do not eat for 2 hours before bedtime.  Take antacids like Tums, but don't take ones that have sodium bicarbonate, magnesium trisilicate, or aspirin.    To care for large, swollen veins (varicose veins)    Try to avoid standing for long periods of time.  Sit with your feet propped up.  Wear support hose.  Get some exercise every day, like walking or swimming.  Counting your baby's kicks  Your doctor may ask you to count your baby's movements, such as kicks, flutters, or rolls.    Find a quiet place, and get comfortable. Write down your start time. Count your baby's movements (except hiccups). When your baby has moved 10 times, you can stop counting. Write down how many minutes it took.   If an hour goes by and you don't feel 10 movements, have something to eat or drink. Count for another hour. If you don't feel at least 10 movements in the 2-hour period, call your doctor.   Follow-up care is a key part of your treatment and safety. Be sure to make and go to all appointments, and call your doctor if you are having problems. It's also a good idea to know your test results and keep a list of the medicines you take.  Where can you learn more?  Go to https://www.Jukelywise.net/patiented  Enter X471 in the search box to learn more about \"Weeks 30 to 32 of Your Pregnancy: Care Instructions.\"  Current as of: November 9, 2022  "              Content Version: 13.7    5665-1252 [employer].   Care instructions adapted under license by your healthcare professional. If you have questions about a medical condition or this instruction, always ask your healthcare professional. [employer] disclaims any warranty or liability for your use of this information.      Learning About Birth Control After Childbirth  What is birth control?     Birth control (contraception) is any method used to prevent pregnancy.  Wait until you're healed before you have sexual intercourse. This takes about 4 to 6 weeks. If you have sex without birth control, there is a chance that you could get pregnant. This is true even if you haven't started having periods again. Even if you breastfeed, you can still get pregnant.  Most experts suggest waiting at least 18 months to get pregnant again. This can reduce risks for you and the baby. There may be reasons for you to get pregnant sooner, though. So talk with your doctor about the risks and benefits.  The only sure way to not get pregnant is to not have sex. But finding a good method of birth control that you're comfortable with can help you avoid an unplanned pregnancy. Your doctor can help you choose the birth control method that's right for you.  What are the types of birth control?  Long-acting reversible contraception (LARC). This is the most effective reversible method you can use to prevent pregnancy. LARCs are implants and intrauterine devices (IUDs). While they are being used, they usually prevent pregnancy for years. If you decide you want to get pregnant, you can have them removed.  Implants are placed under the skin of the arm. This can be done right after you give birth. They release the hormone progestin. The implant prevents pregnancy for up to 5 years. Talk to your doctor about how long you can use it.  IUDs are placed in the uterus by a doctor. This can be done right after you give  "birth, if you and your doctor discuss it beforehand. Or it can be done at a doctor visit later. There are two main types of IUDs--the copper IUD and the hormonal IUD. The hormonal IUD releases progestin. IUDs prevent pregnancy for 3 to 12 years, depending on the type. Talk to your doctor about how long you can use it.  Hormonal methods. They are very good at preventing pregnancy. Combination birth control pills (\"the pill\"), skin patches, and vaginal rings release the hormones estrogen and progestin. Depo-Provera is a shot you get every 3 months. Shots, mini-pills, IUDs, and implants release progestin only. It's best to use progestin-only options in the first few weeks after you give birth.  Barrier methods. These don't prevent pregnancy as well as implants, IUDs, or hormonal methods do. Barrier methods include condoms, diaphragms, and cervical caps. You must use them every time you have sex. If you had a diaphragm or cervical cap before you got pregnant, talk to your doctor to see if you need a different size. Condoms can be used anytime after you give birth.  Natural family planning. This is also known as fertility awareness or the rhythm method. It can work if you and your partner are careful and you have a regular ovulation cycle. But it doesn't work better than other birth control methods. You will need to keep records so you know when you are most likely to become pregnant. And during those times, you will need to use a barrier method or not have sex.  Permanent birth control (sterilization). It gives you lasting protection against pregnancy. A man can have a vasectomy. A woman can have her tubes tied (tubal ligation). But this is only a good choice if you are sure that you don't want any more children.  Emergency contraception. This is a backup method to prevent pregnancy if you didn't use birth control or if a condom breaks. The most effective emergency contraception is an IUD (inserted by a doctor). You can " "also get emergency contraceptive pills. You can get them with a prescription from your doctor or without a prescription at most drugstores.  How can you get birth control?  You can buy:  Condoms and spermicides without a prescription. You can get them in drugstores, online, and in many grocery stores.  Some forms of emergency contraception without a prescription. You can get these at most drugstores.  You need to see a doctor or visit family planning clinic to:  Get a prescription for birth control pills and other methods that use hormones.  Have an implant or IUD inserted. This includes the type of IUD used for emergency contraception.  Get a hormone shot.  Get a prescription for a diaphragm or cervical cap.  Get a prescription for certain kinds of emergency contraception.  Follow-up care is a key part of your treatment and safety. Be sure to make and go to all appointments, and call your doctor if you are having problems. It's also a good idea to know your test results and keep a list of the medicines you take.  Where can you learn more?  Go to https://www.[employer]iga.net/patiented  Enter X408 in the search box to learn more about \"Learning About Birth Control After Childbirth.\"  Current as of: August 2, 2022               Content Version: 13.7    2475-9003 [employer].   Care instructions adapted under license by your healthcare professional. If you have questions about a medical condition or this instruction, always ask your healthcare professional. [employer] disclaims any warranty or liability for your use of this information.    Thank you for trusting us with your care!     If you need to contact us for questions about:  Symptoms, Scheduling & Medical Questions; Non-urgent (2-3 day response) Dialoggy message, Urgent (needing response today) [phone] (if after 3:30pm next day response)   Prescriptions: Please call your Pharmacy   Billing: [place] [phone] or M " Physicians:[phone]

## 2023-09-27 ENCOUNTER — PRENATAL OFFICE VISIT (OUTPATIENT)
Dept: OBGYN | Facility: CLINIC | Age: 28
End: 2023-09-27
Attending: ADVANCED PRACTICE MIDWIFE
Payer: COMMERCIAL

## 2023-09-27 VITALS
BODY MASS INDEX: 37.59 KG/M2 | WEIGHT: 233.9 LBS | SYSTOLIC BLOOD PRESSURE: 120 MMHG | HEART RATE: 74 BPM | HEIGHT: 66 IN | DIASTOLIC BLOOD PRESSURE: 77 MMHG

## 2023-09-27 DIAGNOSIS — E66.811 CLASS 1 OBESITY DUE TO EXCESS CALORIES WITHOUT SERIOUS COMORBIDITY WITH BODY MASS INDEX (BMI) OF 33.0 TO 33.9 IN ADULT: ICD-10-CM

## 2023-09-27 DIAGNOSIS — O09.91 SUPERVISION OF HIGH RISK PREGNANCY IN FIRST TRIMESTER: Primary | ICD-10-CM

## 2023-09-27 DIAGNOSIS — E66.09 CLASS 1 OBESITY DUE TO EXCESS CALORIES WITHOUT SERIOUS COMORBIDITY WITH BODY MASS INDEX (BMI) OF 33.0 TO 33.9 IN ADULT: ICD-10-CM

## 2023-09-27 PROCEDURE — 99207 PR PRENATAL VISIT: CPT | Performed by: ADVANCED PRACTICE MIDWIFE

## 2023-09-27 PROCEDURE — 90471 IMMUNIZATION ADMIN: CPT

## 2023-09-27 PROCEDURE — G0463 HOSPITAL OUTPT CLINIC VISIT: HCPCS | Performed by: ADVANCED PRACTICE MIDWIFE

## 2023-09-27 PROCEDURE — 90715 TDAP VACCINE 7 YRS/> IM: CPT

## 2023-09-27 PROCEDURE — 250N000011 HC RX IP 250 OP 636

## 2023-09-28 ENCOUNTER — ANCILLARY PROCEDURE (OUTPATIENT)
Dept: ULTRASOUND IMAGING | Facility: CLINIC | Age: 28
End: 2023-09-28
Attending: ADVANCED PRACTICE MIDWIFE
Payer: COMMERCIAL

## 2023-09-28 ENCOUNTER — TELEPHONE (OUTPATIENT)
Dept: OBGYN | Facility: CLINIC | Age: 28
End: 2023-09-28
Payer: COMMERCIAL

## 2023-09-28 DIAGNOSIS — O09.91 SUPERVISION OF HIGH RISK PREGNANCY IN FIRST TRIMESTER: ICD-10-CM

## 2023-09-28 PROCEDURE — 76816 OB US FOLLOW-UP PER FETUS: CPT

## 2023-09-28 PROCEDURE — 76819 FETAL BIOPHYS PROFIL W/O NST: CPT | Mod: 26 | Performed by: OBSTETRICS & GYNECOLOGY

## 2023-09-28 PROCEDURE — 76816 OB US FOLLOW-UP PER FETUS: CPT | Mod: 26 | Performed by: OBSTETRICS & GYNECOLOGY

## 2023-09-29 ENCOUNTER — TELEPHONE (OUTPATIENT)
Dept: OBGYN | Facility: CLINIC | Age: 28
End: 2023-09-29
Payer: COMMERCIAL

## 2023-09-29 NOTE — LETTER
9/29/2023       RE: Mi Salgado  [address] [place] [id number]     To Whom it May Concern,    The patient above is receiving prenatal care at our clinic and has an estimated due date of 11/23/2023.     Due to her ongoing pelvic pain, please excuse Mi from her work duties until she able to perform work duties, such as climbing stairs without pain.    Sincerely,    Janay Islas RN on behalf of Mando Mar CNM

## 2023-09-29 NOTE — CONFIDENTIAL NOTE
[facility]    Phone Message    May a detailed message be left on voicemail: no    Reason for Call: Pt stated she also has a part time, weekend job and has to do a lot of stairs and with her pelvic pain she doesn't think she can sustain that position in addition to her full time job during her pregnancy. Pt is wondering if she can get paperwork stating she needs to be off until birth. Pt is supposed to work this weekend (Sunday morning) so she's hoping she can get the paperwork before this weekend. Prefers sending it to email:   [email or web address]    Please follow up with pt by phone or my chart. Thank you      Action Taken: Message routed to:  Other: Whs    Travel Screening: Not Applicable

## 2023-10-03 ENCOUNTER — ANCILLARY PROCEDURE (OUTPATIENT)
Dept: ULTRASOUND IMAGING | Facility: CLINIC | Age: 28
End: 2023-10-03
Attending: ADVANCED PRACTICE MIDWIFE
Payer: COMMERCIAL

## 2023-10-03 ENCOUNTER — TELEPHONE (OUTPATIENT)
Dept: OBGYN | Facility: CLINIC | Age: 28
End: 2023-10-03
Payer: COMMERCIAL

## 2023-10-03 DIAGNOSIS — O09.91 SUPERVISION OF HIGH RISK PREGNANCY IN FIRST TRIMESTER: ICD-10-CM

## 2023-10-03 PROBLEM — O40.9XX0 POLYHYDRAMNIOS AFFECTING PREGNANCY: Status: ACTIVE | Noted: 2023-10-03

## 2023-10-03 PROCEDURE — 76819 FETAL BIOPHYS PROFIL W/O NST: CPT | Mod: 26 | Performed by: STUDENT IN AN ORGANIZED HEALTH CARE EDUCATION/TRAINING PROGRAM

## 2023-10-03 PROCEDURE — 76819 FETAL BIOPHYS PROFIL W/O NST: CPT

## 2023-10-11 ENCOUNTER — ANCILLARY PROCEDURE (OUTPATIENT)
Dept: ULTRASOUND IMAGING | Facility: CLINIC | Age: 28
End: 2023-10-11
Attending: ADVANCED PRACTICE MIDWIFE
Payer: COMMERCIAL

## 2023-10-11 ENCOUNTER — PRENATAL OFFICE VISIT (OUTPATIENT)
Dept: OBGYN | Facility: CLINIC | Age: 28
End: 2023-10-11
Attending: ADVANCED PRACTICE MIDWIFE
Payer: COMMERCIAL

## 2023-10-11 VITALS
BODY MASS INDEX: 37.28 KG/M2 | SYSTOLIC BLOOD PRESSURE: 117 MMHG | WEIGHT: 232 LBS | HEIGHT: 66 IN | DIASTOLIC BLOOD PRESSURE: 76 MMHG | HEART RATE: 73 BPM

## 2023-10-11 DIAGNOSIS — O09.93 HRP (HIGH RISK PREGNANCY), THIRD TRIMESTER: Primary | ICD-10-CM

## 2023-10-11 DIAGNOSIS — E66.811 CLASS 1 OBESITY DUE TO EXCESS CALORIES WITHOUT SERIOUS COMORBIDITY WITH BODY MASS INDEX (BMI) OF 33.0 TO 33.9 IN ADULT: ICD-10-CM

## 2023-10-11 DIAGNOSIS — O40.9XX0 POLYHYDRAMNIOS AFFECTING PREGNANCY: ICD-10-CM

## 2023-10-11 DIAGNOSIS — E66.09 CLASS 1 OBESITY DUE TO EXCESS CALORIES WITHOUT SERIOUS COMORBIDITY WITH BODY MASS INDEX (BMI) OF 33.0 TO 33.9 IN ADULT: ICD-10-CM

## 2023-10-11 DIAGNOSIS — O09.91 SUPERVISION OF HIGH RISK PREGNANCY IN FIRST TRIMESTER: ICD-10-CM

## 2023-10-11 PROCEDURE — 76819 FETAL BIOPHYS PROFIL W/O NST: CPT | Mod: 26 | Performed by: OBSTETRICS & GYNECOLOGY

## 2023-10-11 PROCEDURE — 99207 PR PRENATAL VISIT: CPT | Performed by: ADVANCED PRACTICE MIDWIFE

## 2023-10-11 PROCEDURE — 76819 FETAL BIOPHYS PROFIL W/O NST: CPT

## 2023-10-11 PROCEDURE — 59025 FETAL NON-STRESS TEST: CPT | Mod: 26,59 | Performed by: ADVANCED PRACTICE MIDWIFE

## 2023-10-11 PROCEDURE — G0463 HOSPITAL OUTPT CLINIC VISIT: HCPCS | Mod: 25 | Performed by: ADVANCED PRACTICE MIDWIFE

## 2023-10-11 NOTE — NURSING NOTE
Assumed care from Janay TO at 0935 for completion of NST due to fetal tachycardia on US.  at 33w6d. Pt reports active FM, denies contractions/LOF/vaginal bleeding. NST reactive per Melodie RAHMAN, see flowsheet for details. NST placed in HIMs folder at  for scanning.

## 2023-10-11 NOTE — LETTER
10/11/2023       RE: Mi Salgado  [address] [place] [id number]     To Whom it May Concern,    Mi Salgado is receiving prenatal care with our clinic. She is currently 34 weeks pregnant with an due date of 11/23/2023. Please allow Mi Salgado to begin maternity leave early, starting on October 15th, 2023.     Sincerely,    Janay Islas RN on behalf of BABITA Kelly CNM

## 2023-10-11 NOTE — PROGRESS NOTES
"Subjective:      28 year old  at 33w6d presentst for a routine prenatal appointment.    Denies vaginal bleeding or leakage of fluid.  Denies contractions. Reports regular fetal movement.       No HA, visual changes, RUQ or epigastric pain.   Patient concerns: Worried about change in activity.  Has not been able to get to the gym as often, pelvis is uncomfortable.  Has access to swimming pool at gym, will try that.  Reviewed nutrition and her concerns about body image.  BPP arranged for today.  BPP  JOSÉ IMGUEL 25.8.  FHT's noted to be tachycardic into 180's per ultrasonographer.  NST obtained   Feeling well overall.    Objective:    NST:  FHT's baseline 145 moderate variability with accelerations to 180.  Fetus very active at start of monitoring and difficulty obtaining consistent tracing initially.   Vitals:    10/11/23 0757   BP: 117/76   Pulse: 73   Weight: 105.2 kg (232 lb)   Height: 1.676 m (5' 6\")   , see ob flowsheet  Assessment/Plan     Encounter Diagnoses   Name Primary?    Polyhydramnios affecting pregnancy     HRP (high risk pregnancy), third trimester Yes    BMI 34      Orders Placed This Encounter   Procedures    US OB Fetal Biophys Prf wo NonStrs Singls Sgl    US OB Fetal Biophys Prf wo NonStrs Singls Sgl    US OB Fetal Biophys Prf wo NonStrs Singls Sgl    US OB Fetal Biophys Prf wo NonStrs Singls Sgl    US OB Follow Up >14 Weeks         - Reviewed total weight gain, encouraged continued healthy diet and exercise.  Reviewed importance of daily fetal kick count and why/how to contact provider.    - Reviewed why/how to contact provider if headache/visual changes/RUQ or epigastric pain, decreased fetal movement, vaginal bleeding, leakage of fluid or more than 4 contractions in an hour.     Patient education/orders or handouts today:  Weekly BPP due to mild polyhydramnios.  Reviewed GBS screening at 36-37wks.    Return to clinic in 1 weeks and prn if questions or concerns.     Ana Sewell, BABITA " CNM

## 2023-10-11 NOTE — LETTER
10/11/2023       RE: Mi Salgado  [address] [place] [id number]     To Whom it May Concern,    Mi Salgado is receiving prenatal care with our clinic. Please excuse her absence from work this morning as she was in clinic for an extended period of time this morning for fetal monitoring.     Sincerely,    Janay Islas RN on behalf of BABITA Kelly CNM

## 2023-10-17 ENCOUNTER — ANCILLARY PROCEDURE (OUTPATIENT)
Dept: ULTRASOUND IMAGING | Facility: CLINIC | Age: 28
End: 2023-10-17
Attending: ADVANCED PRACTICE MIDWIFE
Payer: COMMERCIAL

## 2023-10-17 DIAGNOSIS — O40.9XX0 POLYHYDRAMNIOS AFFECTING PREGNANCY: ICD-10-CM

## 2023-10-17 PROCEDURE — 76819 FETAL BIOPHYS PROFIL W/O NST: CPT | Mod: 26 | Performed by: STUDENT IN AN ORGANIZED HEALTH CARE EDUCATION/TRAINING PROGRAM

## 2023-10-17 PROCEDURE — 76819 FETAL BIOPHYS PROFIL W/O NST: CPT

## 2023-10-18 ENCOUNTER — TELEPHONE (OUTPATIENT)
Dept: OBGYN | Facility: CLINIC | Age: 28
End: 2023-10-18

## 2023-10-18 ENCOUNTER — TELEPHONE (OUTPATIENT)
Dept: OBGYN | Facility: CLINIC | Age: 28
End: 2023-10-18
Payer: COMMERCIAL

## 2023-10-18 NOTE — TELEPHONE ENCOUNTER
S-(situation): patient called with questions in regards to her amniotic fluid levels    B-(background): patient is currently 34+6 weeks pregnant and is getting weekly BPP which has showed moderate polyhydramnios    A-(assessment): patient called with a few questions regarding her BPP that she had done yesterday. Patient stated that she could see that her Amniotic fluid level has increased and she is worried that she will need another . She also had other questions as well and did say that she missed her EDMOND on Monday and is not coming back in until 10-24-23    R-(recommendations): I did let her know that I will send this message to the midwives and hopefully one of them can reach out to her to go over all of her questions.     Patient verbalized understanding and just said that she has a lot of anxiety over this.

## 2023-10-18 NOTE — TELEPHONE ENCOUNTER
"Returning call to patient regarding ultrasound results and polyhydramnios.     Pt concerned because of new diagnosis of moderate polyhydramnios and wondering about associated risks and if TOLAC will still be an option. Pt did not get to review ultrasound yesterday and is feeling \"in the dark\". Apologized for lack of communication that left her feeling anxious.     Reviewed increased risks, but rare, including cord prolapse or placental abruption. Reviewed that polyhydramnios is not an indication for . Pt is hoping for spontaneous labor for TOLAC. Discussed that this is ideal but IOL may be recommended at 39 weeks if still moderate poly, not if mild. We can continue to have this discussion week by week with ultrasound results.     Reviewed plan to have weekly ultrasounds to watch fluid levels with clinic appointment afterward to discuss with provider. Pt agreeable to this.     BABITA Del CastilloM    "

## 2023-10-25 ENCOUNTER — ANCILLARY PROCEDURE (OUTPATIENT)
Dept: ULTRASOUND IMAGING | Facility: CLINIC | Age: 28
End: 2023-10-25
Attending: ADVANCED PRACTICE MIDWIFE
Payer: COMMERCIAL

## 2023-10-25 DIAGNOSIS — O40.9XX0 POLYHYDRAMNIOS AFFECTING PREGNANCY: ICD-10-CM

## 2023-10-25 PROCEDURE — 76819 FETAL BIOPHYS PROFIL W/O NST: CPT

## 2023-10-25 PROCEDURE — 76819 FETAL BIOPHYS PROFIL W/O NST: CPT | Mod: 26 | Performed by: OBSTETRICS & GYNECOLOGY

## 2023-10-26 ENCOUNTER — PRENATAL OFFICE VISIT (OUTPATIENT)
Dept: OBGYN | Facility: CLINIC | Age: 28
End: 2023-10-26
Attending: ADVANCED PRACTICE MIDWIFE
Payer: COMMERCIAL

## 2023-10-26 VITALS
DIASTOLIC BLOOD PRESSURE: 73 MMHG | SYSTOLIC BLOOD PRESSURE: 112 MMHG | BODY MASS INDEX: 37.45 KG/M2 | HEIGHT: 66 IN | HEART RATE: 81 BPM

## 2023-10-26 DIAGNOSIS — O40.9XX0 POLYHYDRAMNIOS AFFECTING PREGNANCY: ICD-10-CM

## 2023-10-26 DIAGNOSIS — Z86.19 HISTORY OF HERPES GENITALIS: ICD-10-CM

## 2023-10-26 DIAGNOSIS — E66.811 CLASS 1 OBESITY DUE TO EXCESS CALORIES WITHOUT SERIOUS COMORBIDITY WITH BODY MASS INDEX (BMI) OF 33.0 TO 33.9 IN ADULT: ICD-10-CM

## 2023-10-26 DIAGNOSIS — E66.09 CLASS 1 OBESITY DUE TO EXCESS CALORIES WITHOUT SERIOUS COMORBIDITY WITH BODY MASS INDEX (BMI) OF 33.0 TO 33.9 IN ADULT: ICD-10-CM

## 2023-10-26 DIAGNOSIS — O09.93 SUPERVISION OF HIGH RISK PREGNANCY IN THIRD TRIMESTER: Primary | ICD-10-CM

## 2023-10-26 DIAGNOSIS — Z98.891 HISTORY OF CESAREAN DELIVERY: ICD-10-CM

## 2023-10-26 PROCEDURE — 87653 STREP B DNA AMP PROBE: CPT | Performed by: ADVANCED PRACTICE MIDWIFE

## 2023-10-26 PROCEDURE — 99207 PR PRENATAL VISIT: CPT | Performed by: ADVANCED PRACTICE MIDWIFE

## 2023-10-26 PROCEDURE — G0463 HOSPITAL OUTPT CLINIC VISIT: HCPCS | Performed by: ADVANCED PRACTICE MIDWIFE

## 2023-10-26 RX ORDER — ACYCLOVIR 400 MG/1
400 TABLET ORAL 3 TIMES DAILY
Qty: 60 TABLET | Refills: 1 | Status: ON HOLD | OUTPATIENT
Start: 2023-10-26 | End: 2023-11-21

## 2023-10-26 NOTE — PROGRESS NOTES
"Subjective:      28 year old  at 36w0d presents for a routine prenatal appointment.    Denies cramping/contractions, vaginal bleeding, discharge or leakage of fluid. Reports +fetal movement.  No HA, vision changes, ruq/epigastric pain.      Patient concerns: Feeling well overall. Would like cervical exam as she is experiencing increased pelvic pressure. Feels that she is \"waddling\" by the evening.     Had BPP yesterday- - due to polyhydramnios. JOSÉ MIGUEL 29.8 (mild range). Desires TOLAC and spontaneous labor.     Objective:  Vitals:    10/26/23 1439   BP: 112/73   Pulse: 81   Height: 1.676 m (5' 6\")        See OB flowsheet    SVE: closed, post/med    Assessment/Plan     Encounter Diagnoses   Name Primary?    BMI 34     Polyhydramnios affecting pregnancy     Supervision of high risk pregnancy in third trimester Yes    History of herpes genitalis     History of  delivery      Orders Placed This Encounter   Procedures    US OB Fetal Biophys Prf wo NonStrs Singls Sgl    US OB Fetal Biophys Prf wo NonStrs Singls Sgl    CBC with Platelets     Orders Placed This Encounter   Medications    acyclovir (ZOVIRAX) 400 MG tablet     Sig: Take 1 tablet (400 mg) by mouth 3 times daily     Dispense:  60 tablet     Refill:  1       Labor signs discussed. Reinforced daily fetal movement counts.  Reviewed why/how to contact provider if headache/visual changes/RUQ or epigastric pain, decreased fetal movement, vaginal bleeding, leakage of fluid.    - GBS obtained.  - CBC with plts ordered- discuss completion next week.  - Reviewed recommendation for HSV prophylaxis. Prescription sent for acyclovir 400mg TID.   - Reviewed ultrasound. Mild poly 24-29, Moderate at 30cm. After discussion, will continue weekly BPPs  Delivery timing for mild poly is 39-40+6, mod 39-39+6. Hoping to avoid IOL.  - BPP order placed, will schedule weekly BPP and BABITA Lord, SANCHEZM   "

## 2023-10-26 NOTE — PATIENT INSTRUCTIONS
Thank you for trusting us with your care!     If you need to contact us for questions about:  Symptoms, Scheduling & Medical Questions; Non-urgent (2-3 day response) Tapan message, Urgent (needing response today) [phone] (if after 3:30pm next day response)   Prescriptions: Please call your Pharmacy   Billing: [facility] [phone] or [facility]:[phone]

## 2023-10-27 LAB — GP B STREP DNA SPEC QL NAA+PROBE: POSITIVE

## 2023-11-01 ENCOUNTER — ANCILLARY PROCEDURE (OUTPATIENT)
Dept: ULTRASOUND IMAGING | Facility: CLINIC | Age: 28
End: 2023-11-01
Attending: ADVANCED PRACTICE MIDWIFE
Payer: COMMERCIAL

## 2023-11-01 ENCOUNTER — TELEPHONE (OUTPATIENT)
Dept: OBGYN | Facility: CLINIC | Age: 28
End: 2023-11-01

## 2023-11-01 ENCOUNTER — PRENATAL OFFICE VISIT (OUTPATIENT)
Dept: OBGYN | Facility: CLINIC | Age: 28
End: 2023-11-01
Attending: MIDWIFE
Payer: COMMERCIAL

## 2023-11-01 VITALS — SYSTOLIC BLOOD PRESSURE: 116 MMHG | DIASTOLIC BLOOD PRESSURE: 80 MMHG | HEART RATE: 82 BPM

## 2023-11-01 DIAGNOSIS — O09.93 SUPERVISION OF HIGH RISK PREGNANCY IN THIRD TRIMESTER: ICD-10-CM

## 2023-11-01 DIAGNOSIS — O40.9XX0 POLYHYDRAMNIOS AFFECTING PREGNANCY: ICD-10-CM

## 2023-11-01 DIAGNOSIS — Z98.891 HISTORY OF CESAREAN DELIVERY: ICD-10-CM

## 2023-11-01 DIAGNOSIS — O09.93 HRP (HIGH RISK PREGNANCY), THIRD TRIMESTER: Primary | ICD-10-CM

## 2023-11-01 PROBLEM — O34.219 DESIRES VBAC (VAGINAL BIRTH AFTER CESAREAN) TRIAL: Status: RESOLVED | Noted: 2023-04-18 | Resolved: 2023-11-01

## 2023-11-01 PROCEDURE — G0463 HOSPITAL OUTPT CLINIC VISIT: HCPCS | Mod: 25 | Performed by: MIDWIFE

## 2023-11-01 PROCEDURE — 76818 FETAL BIOPHYS PROFILE W/NST: CPT | Mod: 26 | Performed by: OBSTETRICS & GYNECOLOGY

## 2023-11-01 PROCEDURE — 99207 PR PRENATAL VISIT: CPT | Performed by: MIDWIFE

## 2023-11-01 PROCEDURE — 76819 FETAL BIOPHYS PROFIL W/O NST: CPT

## 2023-11-01 NOTE — PROGRESS NOTES
Subjective:      28 year old  at 36w6d presents for a routine prenatal appointment.         Denies  vaginal bleeding,  leakage of fluid, or change in vaginal discharge.  Occ mild  contractions.  Normal  fetal movement.     No HA, visual changes, RUQ or epigastric pain.   Patient concerns: hoping to deliver vaginally TOLAC  planning epidural if needed earlier than last time  went into labor spontaneously at 39 wks  had BPP 6/8 with reactive NST now  baby was sleeping during US  now awake and kicking  not concerned  mild poly cephalic    Feeling well overall. Reviewed RSV vaccine recommendation  agreeable today   Objective:  Vitals:    23 0836   BP: 116/80   BP Location: Right arm   Pulse: 82    See OB flowsheet  BPP 6/8, no breathing  JOSÉ MIGUEL 28, cephalic   Assessment/Plan     Encounter Diagnoses   Name Primary?    HRP (high risk pregnancy), third trimester Yes    History of  delivery      No orders of the defined types were placed in this encounter.    Orders Placed This Encounter   Medications    respiratory syncytial virus vaccine, bivalent (ABRYSVO) injection 0.5 mL           2021     1:10 PM 8/10/2022    10:20 AM 2023     3:21 PM   PHQ-9 SCORE   PHQ-9 Total Score 4 0 2     [unfilled]  GBS screening: GBS POS   Birth preferences reviewed: Medicated  Labor signs discussed. Reinforced daily fetal movement counts.  Reviewed why/how to contact provider if headache/visual changes/RUQ or epigastric pain, decreased fetal movement, vaginal bleeding, leakage of fluid.   Return to clinic in 1 week and prn if questions or concerns.     BABITA Burden CNM

## 2023-11-01 NOTE — TELEPHONE ENCOUNTER
Forms received in [facility] e-mail. Forms printed, labeled and placed in CN bin for completion. Routed to rooming staff.

## 2023-11-02 ENCOUNTER — TELEPHONE (OUTPATIENT)
Dept: OBGYN | Facility: CLINIC | Age: 28
End: 2023-11-02
Payer: COMMERCIAL

## 2023-11-02 NOTE — TELEPHONE ENCOUNTER
[facility] paperwork completed signed and faxed today with new start date of 10-15-23.  Patient aware that it is completed.

## 2023-11-07 ENCOUNTER — PRENATAL OFFICE VISIT (OUTPATIENT)
Dept: OBGYN | Facility: CLINIC | Age: 28
End: 2023-11-07
Attending: ADVANCED PRACTICE MIDWIFE
Payer: COMMERCIAL

## 2023-11-07 ENCOUNTER — ANCILLARY PROCEDURE (OUTPATIENT)
Dept: ULTRASOUND IMAGING | Facility: CLINIC | Age: 28
End: 2023-11-07
Attending: ADVANCED PRACTICE MIDWIFE
Payer: COMMERCIAL

## 2023-11-07 VITALS — HEART RATE: 81 BPM | DIASTOLIC BLOOD PRESSURE: 75 MMHG | SYSTOLIC BLOOD PRESSURE: 112 MMHG

## 2023-11-07 DIAGNOSIS — O09.93 SUPERVISION OF HIGH RISK PREGNANCY IN THIRD TRIMESTER: Primary | ICD-10-CM

## 2023-11-07 DIAGNOSIS — E66.09 CLASS 1 OBESITY DUE TO EXCESS CALORIES WITHOUT SERIOUS COMORBIDITY WITH BODY MASS INDEX (BMI) OF 33.0 TO 33.9 IN ADULT: ICD-10-CM

## 2023-11-07 DIAGNOSIS — Z98.891 HISTORY OF CESAREAN DELIVERY: ICD-10-CM

## 2023-11-07 DIAGNOSIS — O40.9XX0 POLYHYDRAMNIOS AFFECTING PREGNANCY: ICD-10-CM

## 2023-11-07 DIAGNOSIS — O09.93 SUPERVISION OF HIGH RISK PREGNANCY IN THIRD TRIMESTER: ICD-10-CM

## 2023-11-07 DIAGNOSIS — E66.811 CLASS 1 OBESITY DUE TO EXCESS CALORIES WITHOUT SERIOUS COMORBIDITY WITH BODY MASS INDEX (BMI) OF 33.0 TO 33.9 IN ADULT: ICD-10-CM

## 2023-11-07 DIAGNOSIS — O99.820 GBS (GROUP B STREPTOCOCCUS CARRIER), +RV CULTURE, CURRENTLY PREGNANT: ICD-10-CM

## 2023-11-07 PROCEDURE — G0463 HOSPITAL OUTPT CLINIC VISIT: HCPCS | Mod: 25 | Performed by: ADVANCED PRACTICE MIDWIFE

## 2023-11-07 PROCEDURE — 76819 FETAL BIOPHYS PROFIL W/O NST: CPT

## 2023-11-07 PROCEDURE — 76819 FETAL BIOPHYS PROFIL W/O NST: CPT | Mod: 26 | Performed by: OBSTETRICS & GYNECOLOGY

## 2023-11-07 PROCEDURE — 99207 PR PRENATAL VISIT: CPT | Performed by: ADVANCED PRACTICE MIDWIFE

## 2023-11-07 RX ORDER — MULTIVIT-MIN/IRON/FOLIC ACID/K 18-600-40
1 CAPSULE ORAL DAILY
Qty: 90 CAPSULE | Refills: 3 | Status: SHIPPED | OUTPATIENT
Start: 2023-11-07

## 2023-11-07 NOTE — NURSING NOTE
Chief Complaint   Patient presents with    Prenatal Care     EDMOND 37 weeks 5 days    Corrie Kennedy LPN

## 2023-11-07 NOTE — PATIENT INSTRUCTIONS
"Week 37 of Your Pregnancy: Care Instructions    Most babies are born between 37 and 40 weeks.   This is a good time to pack a bag to take with you to the birth. Then it will be ready to go when you are.     Learn about breastfeeding.  For example, find out about ways to hold your baby to make breastfeeding easier. And think about learning how to pump and store milk.     Know that crying is normal.  It's common for babies to cry 1 to 3 hours a day. Some cry more, and some cry less.     Learn why babies cry.  They may be hungry; have gas; need a diaper change; or feel cold, warm, tired, lonely, or tense. Sometimes they cry for unknown reasons.     Think about what will help you stay calm when your baby cries.  Taking slow, deep breaths can help. So can taking a break. It's okay to put your baby somewhere safe (like their crib) and walk away for a few minutes.     Learn about safe sleep for your baby.  Always put your baby to sleep on their back. Place them alone in a crib or bassinet with a firm, flat surface. Keep soft items like stuffed animals out of the crib.     Learn what to expect with  poop.  Your baby will have their own bowel patterns. Some babies have several bowel movements a day. Some have fewer.     Know that  babies will often have loose, yellow bowel movements.  Formula-fed babies have more formed stools. If your baby's poop looks like pellets, your baby is constipated.   Follow-up care is a key part of your treatment and safety. Be sure to make and go to all appointments, and call your doctor if you are having problems. It's also a good idea to know your test results and keep a list of the medicines you take.  Where can you learn more?  Go to https://www.[employer].net/patiented  Enter N257 in the search box to learn more about \"Week 37 of Your Pregnancy: Care Instructions.\"  Current as of: 2023               Content Version: 13.8    6779-5562 [employer].   Care " instructions adapted under license by your healthcare professional. If you have questions about a medical condition or this instruction, always ask your healthcare professional. [employer] disclaims any warranty or liability for your use of this information.

## 2023-11-07 NOTE — PROGRESS NOTES
Subjective:     28 year old  at 37w5d presents for routine prenatal visit.     Patient concerns: Feeling well overall. Noticing even more pelvic, vaginal pressure.         Denies regular cramping/contractions, vaginal bleeding, discharge or leakage of fluid. Reports +fetal movement.  No HA, vision changes, ruq/epigastric pain.      Pregnancy c/b hx c/s, desires TOLAC, polyhydramnios.    BPP today- cephalic, 8/8, JOSÉ MIGUEL in moderate poly range at 30.19 cm.   Questions re: measurement of fetal size. Done on - EFW 83.1%, AC 90.3%.     Continuing to take acyclovir for HSV prophylaxis. No lesions or prodromal s/s.     Would like spontaneous labor, tolac. Desires SVE today.    Objective:  Vitals:    23 0900   BP: 112/75   Pulse: 81      See OB flowsheet    SVE: unable to reach int os, ext os 1cm, long/high, post/soft   Negative BLE    Ultrasound:  [place] gestation.  Fetal heart activity: Rate and rhythm is within normal limits.  Fetal heart rate: 138bpm  Fetal presentation: Cephalic  Placenta: Anterior   Amniotic fluid: 9.3cm MVP, 30.2 cm JOSÉ MIGUEL     Biophysical Profile:  Fetal body movements: Normal (2)  Fetal tone: Normal (2)  Fetal breathing movements: Normal (2)  Amniotic fluid volume: Normal (2)   BPP Score: 8/8  Technique: Transabdominal Imaging performed     Impression: BPP 8/8, moderate polyhydramnios again seen. Continue weekly  testing.     Assessment/Plan     Encounter Diagnoses   Name Primary?    BMI 34     Supervision of high risk pregnancy in third trimester Yes    GBS (group B Streptococcus carrier), +RV culture, currently pregnant     History of  delivery- TOLAC consent signed     Polyhydramnios affecting pregnancy      Orders Placed This Encounter   Procedures    US OB Fetal Biophys Prf wo NonStrs Singls Sgl    US OB Biophys Single Gestation Measure    US OB Fetal Biophys Prf wo NonStrs Singls Sgl     Orders Placed This Encounter   Medications    Cholecalciferol (VITAMIN D) 50  MCG (2000 UT) CAPS     Sig: Take 1 capsule by mouth daily Take one capsule daily.     Dispense:  90 capsule     Refill:  3       - Reviewed why/how to contact provider if headache/visual changes/RUQ or epigastric pain, decreased fetal movement, vaginal bleeding, leakage of fluid or strong/regular contractions.   Patient education/orders or handouts today:  Sign/symptoms of labor, When to call for labor or other concerns, Flu shot, Postdates testing discussion, BPP, and Induction of labor    - Continue 400mg acyclovir TID for HSV prophylaxis. Reviewed s/s to notify provider.  - Discussed GBS +, plan for IV PCN in labor.  - Order in place for CBC with plts. Prefers to complete next week.   - Reviewed moderate polyhydramnios (30-34). Was previous mild range.  Delivery timing for mild poly is 39-40+6, mod 39-39+6. Hoping to avoid IOL.   Discussed cervical ripening and IOL methods in setting of TOLAC and poly. Options for newby balloon (relative contraindication with poly) and IV pitocin.     - Will schedule BPP and growth ultrasound with next EDMOND (11/13) and BPP and EDMOND for 39th week. Orders placed.  Continue to discuss delivery timing.     BABITA Fraser, JOSIAH

## 2023-11-09 ENCOUNTER — TELEPHONE (OUTPATIENT)
Dept: OBGYN | Facility: CLINIC | Age: 28
End: 2023-11-09
Payer: COMMERCIAL

## 2023-11-09 DIAGNOSIS — B37.31 YEAST INFECTION OF THE VAGINA: Primary | ICD-10-CM

## 2023-11-09 RX ORDER — TERCONAZOLE 0.4 %
1 CREAM WITH APPLICATOR VAGINAL AT BEDTIME
Qty: 1 G | Refills: 0 | Status: ON HOLD | OUTPATIENT
Start: 2023-11-09 | End: 2023-11-21

## 2023-11-09 NOTE — TELEPHONE ENCOUNTER
Called pt to discuss getting OTC yeast infection treatment. Pt prefers to get prescription, placed prescription for terconazole 0.4% nightly for 7 nights per protocol.

## 2023-11-09 NOTE — TELEPHONE ENCOUNTER
"Connected with patient via 7th floor RN. Patient had appointment on Tuesday where SVE was performed. Patient reports developing itching and irritation 1 hour later that is still present today. Patient describes the irritation as being \"way up there\" and concentrated to her cervix. She denies external itching or irritation, or other abnormal symptoms such as discharge. Of note, patient has a latex allergy.     Patient is wondering if she should be treated for a yeast infection.   "

## 2023-11-14 ENCOUNTER — ANCILLARY PROCEDURE (OUTPATIENT)
Dept: ULTRASOUND IMAGING | Facility: CLINIC | Age: 28
End: 2023-11-14
Attending: ADVANCED PRACTICE MIDWIFE
Payer: COMMERCIAL

## 2023-11-14 ENCOUNTER — TELEPHONE (OUTPATIENT)
Dept: OBGYN | Facility: CLINIC | Age: 28
End: 2023-11-14

## 2023-11-14 DIAGNOSIS — O09.93 SUPERVISION OF HIGH RISK PREGNANCY IN THIRD TRIMESTER: ICD-10-CM

## 2023-11-14 PROCEDURE — 76819 FETAL BIOPHYS PROFIL W/O NST: CPT | Mod: 26 | Performed by: STUDENT IN AN ORGANIZED HEALTH CARE EDUCATION/TRAINING PROGRAM

## 2023-11-14 PROCEDURE — 76819 FETAL BIOPHYS PROFIL W/O NST: CPT

## 2023-11-14 PROCEDURE — 76816 OB US FOLLOW-UP PER FETUS: CPT | Mod: 26 | Performed by: STUDENT IN AN ORGANIZED HEALTH CARE EDUCATION/TRAINING PROGRAM
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
## 2023-11-17 ENCOUNTER — ANESTHESIA (OUTPATIENT)
Dept: OBGYN | Facility: CLINIC | Age: 28
End: 2023-11-17
Payer: COMMERCIAL

## 2023-11-17 ENCOUNTER — HOSPITAL ENCOUNTER (INPATIENT)
Facility: CLINIC | Age: 28
LOS: 4 days | Discharge: HOME HEALTH | End: 2023-11-21
Attending: ADVANCED PRACTICE MIDWIFE | Admitting: ADVANCED PRACTICE MIDWIFE
Payer: COMMERCIAL

## 2023-11-17 ENCOUNTER — ANESTHESIA EVENT (OUTPATIENT)
Dept: OBGYN | Facility: CLINIC | Age: 28
End: 2023-11-17
Payer: COMMERCIAL

## 2023-11-17 DIAGNOSIS — Z98.891 S/P CESAREAN SECTION: Primary | ICD-10-CM

## 2023-11-17 LAB
ABO/RH(D): NORMAL
ANTIBODY SCREEN: NEGATIVE
ERYTHROCYTE [DISTWIDTH] IN BLOOD BY AUTOMATED COUNT: 12.9 % (ref 10–15)
HCT VFR BLD AUTO: 36.8 % (ref 35–47)
HGB BLD-MCNC: 12.7 G/DL (ref 11.7–15.7)
MCH RBC QN AUTO: 30.7 PG (ref 26.5–33)
MCHC RBC AUTO-ENTMCNC: 34.5 G/DL (ref 31.5–36.5)
MCV RBC AUTO: 89 FL (ref 78–100)
PLATELET # BLD AUTO: 160 10E3/UL (ref 150–450)
RBC # BLD AUTO: 4.14 10E6/UL (ref 3.8–5.2)
SARS-COV-2 RNA RESP QL NAA+PROBE: NEGATIVE
SPECIMEN EXPIRATION DATE: NORMAL
WBC # BLD AUTO: 11.6 10E3/UL (ref 4–11)

## 2023-11-17 PROCEDURE — 87635 SARS-COV-2 COVID-19 AMP PRB: CPT | Performed by: ADVANCED PRACTICE MIDWIFE

## 2023-11-17 PROCEDURE — 36415 COLL VENOUS BLD VENIPUNCTURE: CPT | Performed by: ADVANCED PRACTICE MIDWIFE

## 2023-11-17 PROCEDURE — C9803 HOPD COVID-19 SPEC COLLECT: HCPCS

## 2023-11-17 PROCEDURE — 86780 TREPONEMA PALLIDUM: CPT | Performed by: ADVANCED PRACTICE MIDWIFE

## 2023-11-17 PROCEDURE — 86901 BLOOD TYPING SEROLOGIC RH(D): CPT | Performed by: ADVANCED PRACTICE MIDWIFE

## 2023-11-17 PROCEDURE — 85027 COMPLETE CBC AUTOMATED: CPT | Performed by: ADVANCED PRACTICE MIDWIFE

## 2023-11-17 PROCEDURE — 250N000013 HC RX MED GY IP 250 OP 250 PS 637: Performed by: ADVANCED PRACTICE MIDWIFE

## 2023-11-17 PROCEDURE — 250N000009 HC RX 250: Performed by: ADVANCED PRACTICE MIDWIFE

## 2023-11-17 PROCEDURE — 120N000002 HC R&B MED SURG/OB UMMC

## 2023-11-17 PROCEDURE — 86850 RBC ANTIBODY SCREEN: CPT | Performed by: ADVANCED PRACTICE MIDWIFE

## 2023-11-17 PROCEDURE — 250N000011 HC RX IP 250 OP 636: Performed by: ADVANCED PRACTICE MIDWIFE

## 2023-11-17 RX ORDER — METOCLOPRAMIDE 10 MG/1
10 TABLET ORAL EVERY 6 HOURS PRN
Status: DISCONTINUED | OUTPATIENT
Start: 2023-11-17 | End: 2023-11-19 | Stop reason: HOSPADM

## 2023-11-17 RX ORDER — CITRIC ACID/SODIUM CITRATE 334-500MG
30 SOLUTION, ORAL ORAL
Status: DISCONTINUED | OUTPATIENT
Start: 2023-11-17 | End: 2023-11-19 | Stop reason: HOSPADM

## 2023-11-17 RX ORDER — LIDOCAINE HYDROCHLORIDE 20 MG/ML
JELLY TOPICAL ONCE
Status: COMPLETED | OUTPATIENT
Start: 2023-11-17 | End: 2023-11-17

## 2023-11-17 RX ORDER — TRANEXAMIC ACID 10 MG/ML
1 INJECTION, SOLUTION INTRAVENOUS EVERY 30 MIN PRN
Status: DISCONTINUED | OUTPATIENT
Start: 2023-11-17 | End: 2023-11-19 | Stop reason: HOSPADM

## 2023-11-17 RX ORDER — NALOXONE HYDROCHLORIDE 0.4 MG/ML
0.2 INJECTION, SOLUTION INTRAMUSCULAR; INTRAVENOUS; SUBCUTANEOUS
Status: DISCONTINUED | OUTPATIENT
Start: 2023-11-17 | End: 2023-11-19 | Stop reason: HOSPADM

## 2023-11-17 RX ORDER — OXYTOCIN/0.9 % SODIUM CHLORIDE 30/500 ML
100-340 PLASTIC BAG, INJECTION (ML) INTRAVENOUS CONTINUOUS PRN
Status: DISCONTINUED | OUTPATIENT
Start: 2023-11-17 | End: 2023-11-19

## 2023-11-17 RX ORDER — ONDANSETRON 4 MG/1
4 TABLET, ORALLY DISINTEGRATING ORAL EVERY 6 HOURS PRN
Status: DISCONTINUED | OUTPATIENT
Start: 2023-11-17 | End: 2023-11-19 | Stop reason: HOSPADM

## 2023-11-17 RX ORDER — MORPHINE SULFATE 10 MG/ML
10 INJECTION, SOLUTION INTRAMUSCULAR; INTRAVENOUS
Status: COMPLETED | OUTPATIENT
Start: 2023-11-17 | End: 2023-11-18

## 2023-11-17 RX ORDER — NALBUPHINE HYDROCHLORIDE 20 MG/ML
2.5-5 INJECTION, SOLUTION INTRAMUSCULAR; INTRAVENOUS; SUBCUTANEOUS EVERY 6 HOURS PRN
Status: CANCELLED | OUTPATIENT
Start: 2023-11-17

## 2023-11-17 RX ORDER — NALOXONE HYDROCHLORIDE 0.4 MG/ML
0.4 INJECTION, SOLUTION INTRAMUSCULAR; INTRAVENOUS; SUBCUTANEOUS
Status: DISCONTINUED | OUTPATIENT
Start: 2023-11-17 | End: 2023-11-19 | Stop reason: HOSPADM

## 2023-11-17 RX ORDER — HYDROXYZINE HYDROCHLORIDE 25 MG/1
100 TABLET, FILM COATED ORAL
Status: DISCONTINUED | OUTPATIENT
Start: 2023-11-17 | End: 2023-11-19 | Stop reason: HOSPADM

## 2023-11-17 RX ORDER — MISOPROSTOL 200 UG/1
800 TABLET ORAL
Status: DISCONTINUED | OUTPATIENT
Start: 2023-11-17 | End: 2023-11-19 | Stop reason: HOSPADM

## 2023-11-17 RX ORDER — FENTANYL CITRATE 50 UG/ML
100 INJECTION, SOLUTION INTRAMUSCULAR; INTRAVENOUS
Status: DISCONTINUED | OUTPATIENT
Start: 2023-11-17 | End: 2023-11-19 | Stop reason: HOSPADM

## 2023-11-17 RX ORDER — KETOROLAC TROMETHAMINE 30 MG/ML
30 INJECTION, SOLUTION INTRAMUSCULAR; INTRAVENOUS
Status: DISCONTINUED | OUTPATIENT
Start: 2023-11-17 | End: 2023-11-19

## 2023-11-17 RX ORDER — ONDANSETRON 2 MG/ML
4 INJECTION INTRAMUSCULAR; INTRAVENOUS EVERY 6 HOURS PRN
Status: DISCONTINUED | OUTPATIENT
Start: 2023-11-17 | End: 2023-11-19 | Stop reason: HOSPADM

## 2023-11-17 RX ORDER — PENICILLIN G POTASSIUM 5000000 [IU]/1
5 INJECTION, POWDER, FOR SOLUTION INTRAMUSCULAR; INTRAVENOUS ONCE
Status: DISCONTINUED | OUTPATIENT
Start: 2023-11-17 | End: 2023-11-19 | Stop reason: HOSPADM

## 2023-11-17 RX ORDER — OXYTOCIN/0.9 % SODIUM CHLORIDE 30/500 ML
340 PLASTIC BAG, INJECTION (ML) INTRAVENOUS CONTINUOUS PRN
Status: DISCONTINUED | OUTPATIENT
Start: 2023-11-17 | End: 2023-11-19 | Stop reason: HOSPADM

## 2023-11-17 RX ORDER — ACYCLOVIR 400 MG/1
400 TABLET ORAL 3 TIMES DAILY
Status: DISCONTINUED | OUTPATIENT
Start: 2023-11-17 | End: 2023-11-19

## 2023-11-17 RX ORDER — OXYTOCIN 10 [USP'U]/ML
10 INJECTION, SOLUTION INTRAMUSCULAR; INTRAVENOUS
Status: DISCONTINUED | OUTPATIENT
Start: 2023-11-17 | End: 2023-11-19 | Stop reason: HOSPADM

## 2023-11-17 RX ORDER — IBUPROFEN 800 MG/1
800 TABLET, FILM COATED ORAL
Status: DISCONTINUED | OUTPATIENT
Start: 2023-11-17 | End: 2023-11-19

## 2023-11-17 RX ORDER — SODIUM CHLORIDE, SODIUM LACTATE, POTASSIUM CHLORIDE, CALCIUM CHLORIDE 600; 310; 30; 20 MG/100ML; MG/100ML; MG/100ML; MG/100ML
INJECTION, SOLUTION INTRAVENOUS CONTINUOUS
Status: DISCONTINUED | OUTPATIENT
Start: 2023-11-17 | End: 2023-11-19 | Stop reason: HOSPADM

## 2023-11-17 RX ORDER — METOCLOPRAMIDE HYDROCHLORIDE 5 MG/ML
10 INJECTION INTRAMUSCULAR; INTRAVENOUS EVERY 6 HOURS PRN
Status: DISCONTINUED | OUTPATIENT
Start: 2023-11-17 | End: 2023-11-19 | Stop reason: HOSPADM

## 2023-11-17 RX ORDER — PROCHLORPERAZINE 25 MG
25 SUPPOSITORY, RECTAL RECTAL EVERY 12 HOURS PRN
Status: DISCONTINUED | OUTPATIENT
Start: 2023-11-17 | End: 2023-11-19 | Stop reason: HOSPADM

## 2023-11-17 RX ORDER — METHYLERGONOVINE MALEATE 0.2 MG/ML
200 INJECTION INTRAVENOUS
Status: DISCONTINUED | OUTPATIENT
Start: 2023-11-17 | End: 2023-11-19 | Stop reason: HOSPADM

## 2023-11-17 RX ORDER — LIDOCAINE 40 MG/G
CREAM TOPICAL
Status: DISCONTINUED | OUTPATIENT
Start: 2023-11-17 | End: 2023-11-19 | Stop reason: HOSPADM

## 2023-11-17 RX ORDER — PROCHLORPERAZINE MALEATE 10 MG
10 TABLET ORAL EVERY 6 HOURS PRN
Status: DISCONTINUED | OUTPATIENT
Start: 2023-11-17 | End: 2023-11-19 | Stop reason: HOSPADM

## 2023-11-17 RX ORDER — CARBOPROST TROMETHAMINE 250 UG/ML
250 INJECTION, SOLUTION INTRAMUSCULAR
Status: DISCONTINUED | OUTPATIENT
Start: 2023-11-17 | End: 2023-11-19 | Stop reason: HOSPADM

## 2023-11-17 RX ORDER — PENICILLIN G 3000000 [IU]/50ML
3 INJECTION, SOLUTION INTRAVENOUS EVERY 4 HOURS
Status: DISCONTINUED | OUTPATIENT
Start: 2023-11-17 | End: 2023-11-19 | Stop reason: HOSPADM

## 2023-11-17 RX ORDER — OXYTOCIN 10 [USP'U]/ML
10 INJECTION, SOLUTION INTRAMUSCULAR; INTRAVENOUS
Status: DISCONTINUED | OUTPATIENT
Start: 2023-11-17 | End: 2023-11-19

## 2023-11-17 RX ORDER — MISOPROSTOL 200 UG/1
400 TABLET ORAL
Status: DISCONTINUED | OUTPATIENT
Start: 2023-11-17 | End: 2023-11-19 | Stop reason: HOSPADM

## 2023-11-17 RX ADMIN — LIDOCAINE HYDROCHLORIDE: 20 JELLY TOPICAL at 20:53

## 2023-11-17 RX ADMIN — FENTANYL CITRATE 100 MCG: 50 INJECTION INTRAMUSCULAR; INTRAVENOUS at 22:02

## 2023-11-17 RX ADMIN — FENTANYL CITRATE 100 MCG: 50 INJECTION INTRAMUSCULAR; INTRAVENOUS at 21:12

## 2023-11-17 RX ADMIN — ACYCLOVIR 400 MG: 400 TABLET ORAL at 20:37

## 2023-11-17 ASSESSMENT — ACTIVITIES OF DAILY LIVING (ADL)
ADLS_ACUITY_SCORE: 18
ADLS_ACUITY_SCORE: 18
CHANGE_IN_FUNCTIONAL_STATUS_SINCE_ONSET_OF_CURRENT_ILLNESS/INJURY: NO
FALL_HISTORY_WITHIN_LAST_SIX_MONTHS: NO

## 2023-11-18 LAB — T PALLIDUM AB SER QL: NONREACTIVE

## 2023-11-18 PROCEDURE — 120N000002 HC R&B MED SURG/OB UMMC

## 2023-11-18 PROCEDURE — 250N000013 HC RX MED GY IP 250 OP 250 PS 637: Performed by: MIDWIFE

## 2023-11-18 PROCEDURE — 250N000011 HC RX IP 250 OP 636: Performed by: ADVANCED PRACTICE MIDWIFE

## 2023-11-18 PROCEDURE — 250N000011 HC RX IP 250 OP 636: Performed by: MIDWIFE

## 2023-11-18 PROCEDURE — 250N000013 HC RX MED GY IP 250 OP 250 PS 637: Performed by: ADVANCED PRACTICE MIDWIFE

## 2023-11-18 RX ORDER — MORPHINE SULFATE 10 MG/ML
10 INJECTION, SOLUTION INTRAMUSCULAR; INTRAVENOUS
Status: COMPLETED | OUTPATIENT
Start: 2023-11-18 | End: 2023-11-18

## 2023-11-18 RX ADMIN — ACYCLOVIR 400 MG: 400 TABLET ORAL at 22:18

## 2023-11-18 RX ADMIN — HYDROXYZINE HYDROCHLORIDE 100 MG: 50 TABLET, FILM COATED ORAL at 23:46

## 2023-11-18 RX ADMIN — MORPHINE SULFATE 10 MG: 10 INJECTION, SOLUTION INTRAMUSCULAR; INTRAVENOUS at 00:07

## 2023-11-18 RX ADMIN — ACYCLOVIR 400 MG: 400 TABLET ORAL at 14:00

## 2023-11-18 RX ADMIN — MORPHINE SULFATE 10 MG: 10 INJECTION, SOLUTION INTRAMUSCULAR; INTRAVENOUS at 23:54

## 2023-11-18 RX ADMIN — FENTANYL CITRATE 100 MCG: 50 INJECTION INTRAMUSCULAR; INTRAVENOUS at 20:02

## 2023-11-18 RX ADMIN — ACYCLOVIR 400 MG: 400 TABLET ORAL at 08:00

## 2023-11-18 RX ADMIN — HYDROXYZINE HYDROCHLORIDE 100 MG: 50 TABLET, FILM COATED ORAL at 00:02

## 2023-11-18 ASSESSMENT — ACTIVITIES OF DAILY LIVING (ADL)
ADLS_ACUITY_SCORE: 18

## 2023-11-18 NOTE — PROVIDER NOTIFICATION
11/18/23 1610   Provider Notification   Provider Name/Title Dr. Chavez   Method of Notification Phone   Notification Reason Lab/Diagnostic Study     Per MD,  EFW was 9lb 4oz (under 4500 gm) on Comprehensive US on 11/14, and EFW can vary by up to 1 lb. As long as no indications of maternal or fetal compromise, plan to continue TOLAC is reasonable. Patient and S.O. informed and both verbalizing desire to continue TOLAC for now.
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
## 2023-11-18 NOTE — PLAN OF CARE
Goal Outcome Evaluation:      Plan of Care Reviewed With: patient, spouse    Overall Patient Progress: improvingOverall Patient Progress: improving    Pt  at bedside and attentive. VSS, denies LOF, vaginal bleeding, HA, vision changes and epigastric pain. Able to sleep throughout night in between cares. Plan to continue plan of care. Call light left within reach, RN encouraged use.
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
## 2023-11-18 NOTE — PLAN OF CARE
"Late entry due to patient cares.   Data: Patient presented to [facility] at 1930.   Reason for maternal/fetal assessment per patient is Induction Of Labor  .  Patient is a . Prenatal record reviewed.      OB History    Para Term  AB Living   2 1 1 0 0 1   SAB IAB Ectopic Multiple Live Births   0 0 0 0 1      # Outcome Date GA Lbr Layo/2nd Weight Sex Delivery Anes PTL Lv   2 Current            1 Term 22 39w3d  3.81 kg (8 lb 6.4 oz)  CS-Unspec   BOAZ      Birth Comments: Transfer from [facility] at 5 cm with GHTN.  IV fentanyl for pain. Progressed slowly. SVE 8cm. AROM forebag.  Pt frustrated with minmal change and exhaustion requesting a C-birth. Offered an epidural, pt declined. C-birth for failure to dilate. .      Name: Aníbal      Apgar1: 8  Apgar5: 9      Obstetric Comments   HTN,  mood disorder, and    Hx of CS, desires TOLAC.    23, CS 39/3 weeks, 8lbs 6.4 oz, boy, \"Aníbal\" arrest of dilation at 8cm/90%/-1, gestational hypertension - 2 blood pressures elevatated during admission 4 hours apart, normotensive postpartum, NL PIH, BMI 42, transfer from [place] due to GHTN in labor. Started her care at [facility] until 30 weeks      . Medical history:   Past Medical History:   Diagnosis Date    ADHD (attention deficit hyperactivity disorder) 2012    resolved    Anxiety 2017    improved    Carrier of group B Streptococcus     Chlamydia     confidential    Chronic post-traumatic stress disorder (PTSD) 2018    Cluster B personality disorder (H) 2011    Per psych discharge summary dated 11     Depressive disorder     Genital HSV     positive serology; confidential    Gestational hypertension, antepartum 2022    Gonorrhea     confidential    Oppositional defiant disorder of childhood or adolescence     resolved    PCOS (polycystic ovarian syndrome) 2017    Postpartum depression    . Gestational Age 39w1d. " VSS. Fetal movement present. Patient denies cramping, backache, vaginal discharge, pelvic pressure, UTI symptoms, GI problems, bloody show, vaginal bleeding, edema, headache, visual disturbances, epigastric or URQ pain, abdominal pain, rupture of membranes. Support person, Finn present.  Action: Verbal consent for EFM. Triage assessment completed. [place] and EFM applied. Fetal assessment: Presumed adequate fetal oxygenation documented (see flow record).   Response: JOSIAH Tijerina informed of arrival. Pt situated to room, call light within reach.
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
## 2023-11-18 NOTE — PROVIDER NOTIFICATION
11/18/23 1200   Provider Notification   Provider Name/Title Dr. Harmon and Dr. Beaver   Method of Notification At Bedside   Notification Reason Labor Status;Status Update;Other (Comment)     MD's at bedside discussing vaginal delivery with possibility of shoulder distocia versus having a repeat C/S. Patient and S.O had questions answered, and will continue with TOLAC for now. Plan per MD's to return if patient or S.O. request more information or have any further questions.
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
## 2023-11-18 NOTE — PROVIDER NOTIFICATION
11/18/23 1100   Provider Notification   Provider Name/Title Nida Ovalles CNM   Method of Notification At Bedside   Notification Reason Labor Status;Status Update;SVE     CNM at bedside rounding and performing BSUS:Cephalic, SVE per CNM 1-2cm with Cook Catheter intact. Plan per CNM to continue TOLAC and will have Resident MD see patient to discuss risks and benefits regarding vaginal delivery versus C/S. Patient and S.O. at bedside and verbalizing agreement with plan.

## 2023-11-18 NOTE — ANESTHESIA PREPROCEDURE EVALUATION
Anesthesia Pre-Procedure Evaluation    Patient: Mi Salgado   MRN: [id number] : 1995        Procedure : c/section           Past Medical History:   Diagnosis Date    ADHD (attention deficit hyperactivity disorder) 2012    resolved    Anxiety 2017    improved    Carrier of group B Streptococcus     Chlamydia     confidential    Chronic post-traumatic stress disorder (PTSD) 2018    Cluster B personality disorder (H) 2011    Per psych discharge summary dated 11     Depressive disorder     Genital HSV     positive serology; confidential    Gestational hypertension, antepartum 2022    Gonorrhea     confidential    Oppositional defiant disorder of childhood or adolescence     resolved    PCOS (polycystic ovarian syndrome) 2017    Postpartum depression       Past Surgical History:   Procedure Laterality Date    ABDOMEN SURGERY  2022    c/s      Allergies   Allergen Reactions    Latex Rash      Social History     Tobacco Use    Smoking status: Former     Packs/day: .25     Types: Cigarettes     Quit date: 2020     Years since quittin.9    Smokeless tobacco: Never    Tobacco comments:     Pt reports never have been a smoker. Betty REYES RN 23 [id number]   Substance Use Topics    Alcohol use: No     Alcohol/week: 0.0 standard drinks of alcohol      Wt Readings from Last 1 Encounters:   23 108.9 kg (240 lb)        Anesthesia Evaluation   Pt has had prior anesthetic. Type: Regional.        ROS/MED HX  ENT/Pulmonary: Comment:   Former smoker, quit  - neg pulmonary ROS   (+)                tobacco use, Past use,                   (-) asthma   Neurologic:  - neg neurologic ROS     Cardiovascular: Comment: Hx of gestational HTN      METS/Exercise Tolerance:     Hematologic:  - neg hematologic  ROS     Musculoskeletal:       GI/Hepatic:  - neg GI/hepatic ROS     Renal/Genitourinary:       Endo:     (+)               Obesity,      "  Psychiatric/Substance Use: Comment: PTSD  Cluster B personality disorder  ADHD  Oppositional defiant disorder of adolescence      (+) psychiatric history depression and anxiety   Recreational drug usage: Cannabis.    Infectious Disease: Comment: Hx/o Gonorrhea and Chlamydia, treated      Malignancy:       Other: Comment:    now 39w3d here for c/section due to breech presentation, large baby, and polyhydramnios.   1st c/section done due to failure to progress with patient laboring until a dilation of 8.   Polyhydramnios    (+)  , ,previous          Physical Exam    Airway  airway exam normal      Mallampati: I   TM distance: > 3 FB   Neck ROM: full   Mouth opening: > 3 cm    Respiratory Devices and Support         Dental       (+) Minor Abnormalities - some fillings, tiny chips      Cardiovascular   cardiovascular exam normal          Pulmonary   pulmonary exam normal                OUTSIDE LABS:  CBC:   Lab Results   Component Value Date    WBC 11.6 (H) 2023    WBC 12.1 (H) 2023    HGB 12.7 2023    HGB 13.2 2023    HCT 36.8 2023    HCT 36.8 2023     2023     2023     BMP:   Lab Results   Component Value Date     2018     2012    POTASSIUM 3.9 2018    POTASSIUM 3.6 2012    CHLORIDE 109 (H) 2018    CHLORIDE 104 2012    CO2 23 2018    CO2 23 2012    BUN 12 2018    BUN 12 2012    CR 0.56 2023    CR 0.73 2018     2018    GLC 89 10/21/2013     COAGS: No results found for: \"PTT\", \"INR\", \"FIBR\"  POC:   Lab Results   Component Value Date    HCG Negative 10/01/2020    HCGS Negative 2018     HEPATIC:   Lab Results   Component Value Date    ALBUMIN 3.6 2018    PROTTOTAL 6.6 2018    ALT 17 2023    AST 18 2023    ALKPHOS 112 2018    BILITOTAL 0.4 2018     OTHER:   Lab Results   Component Value Date    A1C 5.1 " 04/21/2023    JAMIL 9.1 09/12/2018    LIPASE 138 12/03/2012    TSH 0.78 09/07/2017       Anesthesia Plan    ASA Status:  3       Anesthesia Type: Spinal.              Consents    Anesthesia Plan(s) and associated risks, benefits, and realistic alternatives discussed. Questions answered and patient/representative(s) expressed understanding.     - Discussed:     - Discussed with:  Patient      - Extended Intubation/Ventilatory Support Discussed: No.      - Patient is DNR/DNI Status: No     Use of blood products discussed: Yes.     - Discussed with: Patient.     - Consented: consented to blood products     Postoperative Care    Pain management: Oral pain medications, IV analgesics, Multi-modal analgesia.   PONV prophylaxis: Ondansetron (or other 5HT-3)     Comments:                Yo Dawson MD

## 2023-11-18 NOTE — PROVIDER NOTIFICATION
11/18/23 1505   Provider Notification   Provider Name/Title Dr. Harmon   Method of Notification In Department   Request Evaluate in Person   Notification Reason Patient Request     G1 notified that pt and S.O. have additional questions about fetal size, EFW, and proportions from last Comprehensive US report. Pt notified that G1 with a different patient currently and will come to bedside when available.

## 2023-11-19 LAB
ABO/RH(D): NORMAL
ANTIBODY SCREEN: NEGATIVE
ERYTHROCYTE [DISTWIDTH] IN BLOOD BY AUTOMATED COUNT: 13.1 % (ref 10–15)
HCT VFR BLD AUTO: 39.3 % (ref 35–47)
HGB BLD-MCNC: 13.2 G/DL (ref 11.7–15.7)
MCH RBC QN AUTO: 30.5 PG (ref 26.5–33)
MCHC RBC AUTO-ENTMCNC: 33.6 G/DL (ref 31.5–36.5)
MCV RBC AUTO: 91 FL (ref 78–100)
PLATELET # BLD AUTO: 150 10E3/UL (ref 150–450)
RBC # BLD AUTO: 4.33 10E6/UL (ref 3.8–5.2)
SPECIMEN EXPIRATION DATE: NORMAL
WBC # BLD AUTO: 14.3 10E3/UL (ref 4–11)

## 2023-11-19 PROCEDURE — 710N000010 HC RECOVERY PHASE 1, LEVEL 2, PER MIN: Performed by: OBSTETRICS & GYNECOLOGY

## 2023-11-19 PROCEDURE — 250N000011 HC RX IP 250 OP 636

## 2023-11-19 PROCEDURE — 120N000002 HC R&B MED SURG/OB UMMC

## 2023-11-19 PROCEDURE — 250N000011 HC RX IP 250 OP 636: Mod: JZ | Performed by: STUDENT IN AN ORGANIZED HEALTH CARE EDUCATION/TRAINING PROGRAM

## 2023-11-19 PROCEDURE — 250N000011 HC RX IP 250 OP 636: Performed by: ADVANCED PRACTICE MIDWIFE

## 2023-11-19 PROCEDURE — 250N000011 HC RX IP 250 OP 636: Performed by: STUDENT IN AN ORGANIZED HEALTH CARE EDUCATION/TRAINING PROGRAM

## 2023-11-19 PROCEDURE — 271N000001 HC OR GENERAL SUPPLY NON-STERILE: Performed by: OBSTETRICS & GYNECOLOGY

## 2023-11-19 PROCEDURE — C9290 INJ, BUPIVACAINE LIPOSOME: HCPCS | Performed by: STUDENT IN AN ORGANIZED HEALTH CARE EDUCATION/TRAINING PROGRAM

## 2023-11-19 PROCEDURE — 999N000141 HC STATISTIC PRE-PROCEDURE NURSING ASSESSMENT: Performed by: OBSTETRICS & GYNECOLOGY

## 2023-11-19 PROCEDURE — 370N000017 HC ANESTHESIA TECHNICAL FEE, PER MIN: Performed by: OBSTETRICS & GYNECOLOGY

## 2023-11-19 PROCEDURE — 250N000025 HC SEVOFLURANE, PER MIN: Performed by: OBSTETRICS & GYNECOLOGY

## 2023-11-19 PROCEDURE — 258N000003 HC RX IP 258 OP 636: Performed by: STUDENT IN AN ORGANIZED HEALTH CARE EDUCATION/TRAINING PROGRAM

## 2023-11-19 PROCEDURE — 250N000009 HC RX 250

## 2023-11-19 PROCEDURE — 272N000001 HC OR GENERAL SUPPLY STERILE: Performed by: OBSTETRICS & GYNECOLOGY

## 2023-11-19 PROCEDURE — 250N000013 HC RX MED GY IP 250 OP 250 PS 637: Performed by: ADVANCED PRACTICE MIDWIFE

## 2023-11-19 PROCEDURE — 250N000013 HC RX MED GY IP 250 OP 250 PS 637

## 2023-11-19 PROCEDURE — 999N000016 HC STATISTIC ATTENDANCE AT DELIVERY

## 2023-11-19 PROCEDURE — 250N000009 HC RX 250: Performed by: STUDENT IN AN ORGANIZED HEALTH CARE EDUCATION/TRAINING PROGRAM

## 2023-11-19 PROCEDURE — 86901 BLOOD TYPING SEROLOGIC RH(D): CPT | Performed by: STUDENT IN AN ORGANIZED HEALTH CARE EDUCATION/TRAINING PROGRAM

## 2023-11-19 PROCEDURE — 36415 COLL VENOUS BLD VENIPUNCTURE: CPT | Performed by: STUDENT IN AN ORGANIZED HEALTH CARE EDUCATION/TRAINING PROGRAM

## 2023-11-19 PROCEDURE — 85027 COMPLETE CBC AUTOMATED: CPT | Performed by: STUDENT IN AN ORGANIZED HEALTH CARE EDUCATION/TRAINING PROGRAM

## 2023-11-19 PROCEDURE — 86850 RBC ANTIBODY SCREEN: CPT | Performed by: STUDENT IN AN ORGANIZED HEALTH CARE EDUCATION/TRAINING PROGRAM

## 2023-11-19 PROCEDURE — 360N000076 HC SURGERY LEVEL 3, PER MIN: Performed by: OBSTETRICS & GYNECOLOGY

## 2023-11-19 PROCEDURE — 250N000011 HC RX IP 250 OP 636: Mod: JZ

## 2023-11-19 PROCEDURE — 258N000003 HC RX IP 258 OP 636: Performed by: ADVANCED PRACTICE MIDWIFE

## 2023-11-19 RX ORDER — MODIFIED LANOLIN
OINTMENT (GRAM) TOPICAL
Status: DISCONTINUED | OUTPATIENT
Start: 2023-11-19 | End: 2023-11-21 | Stop reason: HOSPADM

## 2023-11-19 RX ORDER — AMOXICILLIN 250 MG
2 CAPSULE ORAL 2 TIMES DAILY
Status: DISCONTINUED | OUTPATIENT
Start: 2023-11-19 | End: 2023-11-21 | Stop reason: HOSPADM

## 2023-11-19 RX ORDER — HYDROMORPHONE HCL IN WATER/PF 6 MG/30 ML
0.4 PATIENT CONTROLLED ANALGESIA SYRINGE INTRAVENOUS EVERY 5 MIN PRN
Status: DISCONTINUED | OUTPATIENT
Start: 2023-11-19 | End: 2023-11-19 | Stop reason: HOSPADM

## 2023-11-19 RX ORDER — CEFAZOLIN SODIUM/WATER 2 G/20 ML
2 SYRINGE (ML) INTRAVENOUS SEE ADMIN INSTRUCTIONS
Status: DISCONTINUED | OUTPATIENT
Start: 2023-11-19 | End: 2023-11-19 | Stop reason: HOSPADM

## 2023-11-19 RX ORDER — OXYTOCIN 10 [USP'U]/ML
10 INJECTION, SOLUTION INTRAMUSCULAR; INTRAVENOUS
Status: DISCONTINUED | OUTPATIENT
Start: 2023-11-19 | End: 2023-11-19 | Stop reason: HOSPADM

## 2023-11-19 RX ORDER — MISOPROSTOL 200 UG/1
800 TABLET ORAL
Status: DISCONTINUED | OUTPATIENT
Start: 2023-11-19 | End: 2023-11-19 | Stop reason: HOSPADM

## 2023-11-19 RX ORDER — OXYTOCIN/0.9 % SODIUM CHLORIDE 30/500 ML
340 PLASTIC BAG, INJECTION (ML) INTRAVENOUS CONTINUOUS PRN
Status: COMPLETED | OUTPATIENT
Start: 2023-11-19 | End: 2023-11-19

## 2023-11-19 RX ORDER — DIMENHYDRINATE 50 MG/ML
25 INJECTION, SOLUTION INTRAMUSCULAR; INTRAVENOUS
Status: DISCONTINUED | OUTPATIENT
Start: 2023-11-19 | End: 2023-11-19 | Stop reason: HOSPADM

## 2023-11-19 RX ORDER — METHYLERGONOVINE MALEATE 0.2 MG/ML
200 INJECTION INTRAVENOUS
Status: DISCONTINUED | OUTPATIENT
Start: 2023-11-19 | End: 2023-11-19 | Stop reason: HOSPADM

## 2023-11-19 RX ORDER — METOCLOPRAMIDE 10 MG/1
10 TABLET ORAL EVERY 6 HOURS PRN
Status: DISCONTINUED | OUTPATIENT
Start: 2023-11-19 | End: 2023-11-21 | Stop reason: HOSPADM

## 2023-11-19 RX ORDER — KETOROLAC TROMETHAMINE 30 MG/ML
30 INJECTION, SOLUTION INTRAMUSCULAR; INTRAVENOUS EVERY 6 HOURS
Status: COMPLETED | OUTPATIENT
Start: 2023-11-19 | End: 2023-11-20

## 2023-11-19 RX ORDER — HYDROMORPHONE HYDROCHLORIDE 2 MG/1
2 TABLET ORAL
Status: DISCONTINUED | OUTPATIENT
Start: 2023-11-19 | End: 2023-11-20

## 2023-11-19 RX ORDER — TRANEXAMIC ACID 10 MG/ML
1 INJECTION, SOLUTION INTRAVENOUS EVERY 30 MIN PRN
Status: DISCONTINUED | OUTPATIENT
Start: 2023-11-19 | End: 2023-11-19 | Stop reason: HOSPADM

## 2023-11-19 RX ORDER — TRANEXAMIC ACID 10 MG/ML
1 INJECTION, SOLUTION INTRAVENOUS EVERY 30 MIN PRN
Status: DISCONTINUED | OUTPATIENT
Start: 2023-11-19 | End: 2023-11-21 | Stop reason: HOSPADM

## 2023-11-19 RX ORDER — OXYTOCIN/0.9 % SODIUM CHLORIDE 30/500 ML
340 PLASTIC BAG, INJECTION (ML) INTRAVENOUS CONTINUOUS PRN
Status: DISCONTINUED | OUTPATIENT
Start: 2023-11-19 | End: 2023-11-21 | Stop reason: HOSPADM

## 2023-11-19 RX ORDER — AZITHROMYCIN 500 MG/5ML
500 INJECTION, POWDER, LYOPHILIZED, FOR SOLUTION INTRAVENOUS
Status: COMPLETED | OUTPATIENT
Start: 2023-11-19 | End: 2023-11-19

## 2023-11-19 RX ORDER — BUPIVACAINE HYDROCHLORIDE 2.5 MG/ML
INJECTION, SOLUTION EPIDURAL; INFILTRATION; INTRACAUDAL
Status: COMPLETED | OUTPATIENT
Start: 2023-11-19 | End: 2023-11-19

## 2023-11-19 RX ORDER — ONDANSETRON 4 MG/1
4 TABLET, ORALLY DISINTEGRATING ORAL EVERY 6 HOURS PRN
Status: DISCONTINUED | OUTPATIENT
Start: 2023-11-19 | End: 2023-11-21 | Stop reason: HOSPADM

## 2023-11-19 RX ORDER — SIMETHICONE 80 MG
80 TABLET,CHEWABLE ORAL 4 TIMES DAILY PRN
Status: DISCONTINUED | OUTPATIENT
Start: 2023-11-19 | End: 2023-11-21 | Stop reason: HOSPADM

## 2023-11-19 RX ORDER — ACETAMINOPHEN 325 MG/1
975 TABLET ORAL ONCE
Status: COMPLETED | OUTPATIENT
Start: 2023-11-19 | End: 2023-11-19

## 2023-11-19 RX ORDER — MISOPROSTOL 200 UG/1
400 TABLET ORAL
Status: DISCONTINUED | OUTPATIENT
Start: 2023-11-19 | End: 2023-11-21 | Stop reason: HOSPADM

## 2023-11-19 RX ORDER — MISOPROSTOL 200 UG/1
800 TABLET ORAL
Status: DISCONTINUED | OUTPATIENT
Start: 2023-11-19 | End: 2023-11-21 | Stop reason: HOSPADM

## 2023-11-19 RX ORDER — FENTANYL/ROPIVACAINE/NS/PF 2MCG/ML-.1
PLASTIC BAG, INJECTION (ML) EPIDURAL
Status: DISCONTINUED | OUTPATIENT
Start: 2023-11-19 | End: 2023-11-19

## 2023-11-19 RX ORDER — ONDANSETRON 4 MG/1
4 TABLET, ORALLY DISINTEGRATING ORAL EVERY 30 MIN PRN
Status: DISCONTINUED | OUTPATIENT
Start: 2023-11-19 | End: 2023-11-19 | Stop reason: HOSPADM

## 2023-11-19 RX ORDER — AMOXICILLIN 250 MG
1 CAPSULE ORAL 2 TIMES DAILY
Status: DISCONTINUED | OUTPATIENT
Start: 2023-11-19 | End: 2023-11-21 | Stop reason: HOSPADM

## 2023-11-19 RX ORDER — OXYTOCIN/0.9 % SODIUM CHLORIDE 30/500 ML
100-340 PLASTIC BAG, INJECTION (ML) INTRAVENOUS CONTINUOUS PRN
Status: CANCELLED | OUTPATIENT
Start: 2023-11-19

## 2023-11-19 RX ORDER — HYDROXYZINE HYDROCHLORIDE 25 MG/1
50 TABLET, FILM COATED ORAL EVERY 6 HOURS PRN
Status: DISCONTINUED | OUTPATIENT
Start: 2023-11-19 | End: 2023-11-21 | Stop reason: HOSPADM

## 2023-11-19 RX ORDER — HYDROMORPHONE HCL IN WATER/PF 6 MG/30 ML
0.2 PATIENT CONTROLLED ANALGESIA SYRINGE INTRAVENOUS EVERY 5 MIN PRN
Status: DISCONTINUED | OUTPATIENT
Start: 2023-11-19 | End: 2023-11-19 | Stop reason: HOSPADM

## 2023-11-19 RX ORDER — PROCHLORPERAZINE 25 MG
25 SUPPOSITORY, RECTAL RECTAL EVERY 12 HOURS PRN
Status: DISCONTINUED | OUTPATIENT
Start: 2023-11-19 | End: 2023-11-21 | Stop reason: HOSPADM

## 2023-11-19 RX ORDER — OXYCODONE HYDROCHLORIDE 5 MG/1
5 TABLET ORAL EVERY 4 HOURS PRN
Status: DISCONTINUED | OUTPATIENT
Start: 2023-11-19 | End: 2023-11-19

## 2023-11-19 RX ORDER — CEFAZOLIN SODIUM/WATER 2 G/20 ML
2 SYRINGE (ML) INTRAVENOUS
Status: DISCONTINUED | OUTPATIENT
Start: 2023-11-19 | End: 2023-11-19 | Stop reason: HOSPADM

## 2023-11-19 RX ORDER — SODIUM CHLORIDE, SODIUM LACTATE, POTASSIUM CHLORIDE, CALCIUM CHLORIDE 600; 310; 30; 20 MG/100ML; MG/100ML; MG/100ML; MG/100ML
INJECTION, SOLUTION INTRAVENOUS CONTINUOUS
Status: DISCONTINUED | OUTPATIENT
Start: 2023-11-19 | End: 2023-11-19 | Stop reason: HOSPADM

## 2023-11-19 RX ORDER — HYDROXYZINE HYDROCHLORIDE 25 MG/1
25 TABLET, FILM COATED ORAL EVERY 6 HOURS PRN
Status: DISCONTINUED | OUTPATIENT
Start: 2023-11-19 | End: 2023-11-19 | Stop reason: HOSPADM

## 2023-11-19 RX ORDER — DEXTROSE, SODIUM CHLORIDE, SODIUM LACTATE, POTASSIUM CHLORIDE, AND CALCIUM CHLORIDE 5; .6; .31; .03; .02 G/100ML; G/100ML; G/100ML; G/100ML; G/100ML
INJECTION, SOLUTION INTRAVENOUS CONTINUOUS
Status: DISCONTINUED | OUTPATIENT
Start: 2023-11-19 | End: 2023-11-21 | Stop reason: HOSPADM

## 2023-11-19 RX ORDER — LIDOCAINE 40 MG/G
CREAM TOPICAL
Status: DISCONTINUED | OUTPATIENT
Start: 2023-11-19 | End: 2023-11-21 | Stop reason: HOSPADM

## 2023-11-19 RX ORDER — ONDANSETRON 2 MG/ML
4 INJECTION INTRAMUSCULAR; INTRAVENOUS EVERY 6 HOURS PRN
Status: DISCONTINUED | OUTPATIENT
Start: 2023-11-19 | End: 2023-11-19

## 2023-11-19 RX ORDER — ACETAMINOPHEN 325 MG/1
975 TABLET ORAL ONCE
Status: DISCONTINUED | OUTPATIENT
Start: 2023-11-19 | End: 2023-11-19 | Stop reason: HOSPADM

## 2023-11-19 RX ORDER — HYDROMORPHONE HCL IN WATER/PF 6 MG/30 ML
0.2 PATIENT CONTROLLED ANALGESIA SYRINGE INTRAVENOUS
Status: COMPLETED | OUTPATIENT
Start: 2023-11-19 | End: 2023-11-20

## 2023-11-19 RX ORDER — NALOXONE HYDROCHLORIDE 0.4 MG/ML
0.4 INJECTION, SOLUTION INTRAMUSCULAR; INTRAVENOUS; SUBCUTANEOUS
Status: DISCONTINUED | OUTPATIENT
Start: 2023-11-19 | End: 2023-11-21 | Stop reason: HOSPADM

## 2023-11-19 RX ORDER — OXYTOCIN 10 [USP'U]/ML
10 INJECTION, SOLUTION INTRAMUSCULAR; INTRAVENOUS
Status: DISCONTINUED | OUTPATIENT
Start: 2023-11-19 | End: 2023-11-21 | Stop reason: HOSPADM

## 2023-11-19 RX ORDER — CARBOPROST TROMETHAMINE 250 UG/ML
250 INJECTION, SOLUTION INTRAMUSCULAR
Status: DISCONTINUED | OUTPATIENT
Start: 2023-11-19 | End: 2023-11-21 | Stop reason: HOSPADM

## 2023-11-19 RX ORDER — OXYTOCIN 10 [USP'U]/ML
10 INJECTION, SOLUTION INTRAMUSCULAR; INTRAVENOUS
Status: CANCELLED | OUTPATIENT
Start: 2023-11-19

## 2023-11-19 RX ORDER — FENTANYL CITRATE-0.9 % NACL/PF 10 MCG/ML
100 PLASTIC BAG, INJECTION (ML) INTRAVENOUS EVERY 5 MIN PRN
Status: DISCONTINUED | OUTPATIENT
Start: 2023-11-19 | End: 2023-11-19 | Stop reason: HOSPADM

## 2023-11-19 RX ORDER — ONDANSETRON 2 MG/ML
4 INJECTION INTRAMUSCULAR; INTRAVENOUS EVERY 30 MIN PRN
Status: DISCONTINUED | OUTPATIENT
Start: 2023-11-19 | End: 2023-11-19 | Stop reason: HOSPADM

## 2023-11-19 RX ORDER — PROCHLORPERAZINE MALEATE 10 MG
10 TABLET ORAL EVERY 6 HOURS PRN
Status: DISCONTINUED | OUTPATIENT
Start: 2023-11-19 | End: 2023-11-21 | Stop reason: HOSPADM

## 2023-11-19 RX ORDER — FENTANYL CITRATE 50 UG/ML
50 INJECTION, SOLUTION INTRAMUSCULAR; INTRAVENOUS EVERY 5 MIN PRN
Status: DISCONTINUED | OUTPATIENT
Start: 2023-11-19 | End: 2023-11-19 | Stop reason: HOSPADM

## 2023-11-19 RX ORDER — METHYLERGONOVINE MALEATE 0.2 MG/ML
200 INJECTION INTRAVENOUS
Status: DISCONTINUED | OUTPATIENT
Start: 2023-11-19 | End: 2023-11-21 | Stop reason: HOSPADM

## 2023-11-19 RX ORDER — IBUPROFEN 800 MG/1
800 TABLET, FILM COATED ORAL EVERY 6 HOURS
Status: DISCONTINUED | OUTPATIENT
Start: 2023-11-20 | End: 2023-11-21 | Stop reason: HOSPADM

## 2023-11-19 RX ORDER — CITRIC ACID/SODIUM CITRATE 334-500MG
30 SOLUTION, ORAL ORAL
Status: COMPLETED | OUTPATIENT
Start: 2023-11-19 | End: 2023-11-19

## 2023-11-19 RX ORDER — CEFAZOLIN SODIUM/WATER 3 G/30 ML
3 SYRINGE (ML) INTRAVENOUS
Status: DISCONTINUED | OUTPATIENT
Start: 2023-11-19 | End: 2023-11-19

## 2023-11-19 RX ORDER — HYDROXYZINE HYDROCHLORIDE 25 MG/1
25 TABLET, FILM COATED ORAL EVERY 6 HOURS PRN
Status: DISCONTINUED | OUTPATIENT
Start: 2023-11-19 | End: 2023-11-21 | Stop reason: HOSPADM

## 2023-11-19 RX ORDER — MISOPROSTOL 200 UG/1
400 TABLET ORAL
Status: DISCONTINUED | OUTPATIENT
Start: 2023-11-19 | End: 2023-11-19 | Stop reason: HOSPADM

## 2023-11-19 RX ORDER — ONDANSETRON 4 MG/1
4 TABLET, ORALLY DISINTEGRATING ORAL EVERY 6 HOURS PRN
Status: DISCONTINUED | OUTPATIENT
Start: 2023-11-19 | End: 2023-11-19

## 2023-11-19 RX ORDER — LIDOCAINE 40 MG/G
CREAM TOPICAL
Status: DISCONTINUED | OUTPATIENT
Start: 2023-11-19 | End: 2023-11-19 | Stop reason: HOSPADM

## 2023-11-19 RX ORDER — HYDROCORTISONE 25 MG/G
CREAM TOPICAL 3 TIMES DAILY PRN
Status: DISCONTINUED | OUTPATIENT
Start: 2023-11-19 | End: 2023-11-21 | Stop reason: HOSPADM

## 2023-11-19 RX ORDER — ENOXAPARIN SODIUM 100 MG/ML
40 INJECTION SUBCUTANEOUS EVERY 24 HOURS
Status: DISCONTINUED | OUTPATIENT
Start: 2023-11-20 | End: 2023-11-21 | Stop reason: HOSPADM

## 2023-11-19 RX ORDER — CARBOPROST TROMETHAMINE 250 UG/ML
250 INJECTION, SOLUTION INTRAMUSCULAR
Status: DISCONTINUED | OUTPATIENT
Start: 2023-11-19 | End: 2023-11-19 | Stop reason: HOSPADM

## 2023-11-19 RX ORDER — ONDANSETRON 2 MG/ML
4 INJECTION INTRAMUSCULAR; INTRAVENOUS EVERY 6 HOURS PRN
Status: DISCONTINUED | OUTPATIENT
Start: 2023-11-19 | End: 2023-11-21 | Stop reason: HOSPADM

## 2023-11-19 RX ORDER — MEPERIDINE HYDROCHLORIDE 25 MG/ML
12.5 INJECTION INTRAMUSCULAR; INTRAVENOUS; SUBCUTANEOUS EVERY 5 MIN PRN
Status: DISCONTINUED | OUTPATIENT
Start: 2023-11-19 | End: 2023-11-19 | Stop reason: HOSPADM

## 2023-11-19 RX ORDER — PROPOFOL 10 MG/ML
INJECTION, EMULSION INTRAVENOUS PRN
Status: DISCONTINUED | OUTPATIENT
Start: 2023-11-19 | End: 2023-11-19

## 2023-11-19 RX ORDER — GLYCOPYRROLATE 0.2 MG/ML
INJECTION, SOLUTION INTRAMUSCULAR; INTRAVENOUS PRN
Status: DISCONTINUED | OUTPATIENT
Start: 2023-11-19 | End: 2023-11-19

## 2023-11-19 RX ORDER — FENTANYL CITRATE 50 UG/ML
25 INJECTION, SOLUTION INTRAMUSCULAR; INTRAVENOUS EVERY 5 MIN PRN
Status: DISCONTINUED | OUTPATIENT
Start: 2023-11-19 | End: 2023-11-19 | Stop reason: HOSPADM

## 2023-11-19 RX ORDER — NALOXONE HYDROCHLORIDE 0.4 MG/ML
0.2 INJECTION, SOLUTION INTRAMUSCULAR; INTRAVENOUS; SUBCUTANEOUS
Status: DISCONTINUED | OUTPATIENT
Start: 2023-11-19 | End: 2023-11-21 | Stop reason: HOSPADM

## 2023-11-19 RX ORDER — METOCLOPRAMIDE HYDROCHLORIDE 5 MG/ML
10 INJECTION INTRAMUSCULAR; INTRAVENOUS EVERY 6 HOURS PRN
Status: DISCONTINUED | OUTPATIENT
Start: 2023-11-19 | End: 2023-11-21 | Stop reason: HOSPADM

## 2023-11-19 RX ORDER — BISACODYL 10 MG
10 SUPPOSITORY, RECTAL RECTAL DAILY PRN
Status: DISCONTINUED | OUTPATIENT
Start: 2023-11-21 | End: 2023-11-21 | Stop reason: HOSPADM

## 2023-11-19 RX ORDER — ACETAMINOPHEN 325 MG/1
975 TABLET ORAL EVERY 6 HOURS
Status: DISCONTINUED | OUTPATIENT
Start: 2023-11-19 | End: 2023-11-21 | Stop reason: HOSPADM

## 2023-11-19 RX ADMIN — SODIUM CITRATE AND CITRIC ACID MONOHYDRATE 30 ML: 500; 334 SOLUTION ORAL at 15:25

## 2023-11-19 RX ADMIN — PROPOFOL 30 MG: 10 INJECTION, EMULSION INTRAVENOUS at 17:24

## 2023-11-19 RX ADMIN — HYDROMORPHONE HYDROCHLORIDE 0.4 MG: 0.2 INJECTION, SOLUTION INTRAMUSCULAR; INTRAVENOUS; SUBCUTANEOUS at 19:21

## 2023-11-19 RX ADMIN — FENTANYL CITRATE 50 MCG: 50 INJECTION, SOLUTION INTRAMUSCULAR; INTRAVENOUS at 18:43

## 2023-11-19 RX ADMIN — PROPOFOL 30 MG: 10 INJECTION, EMULSION INTRAVENOUS at 17:18

## 2023-11-19 RX ADMIN — PROPOFOL 50 MG: 10 INJECTION, EMULSION INTRAVENOUS at 17:06

## 2023-11-19 RX ADMIN — DEXMEDETOMIDINE HYDROCHLORIDE 20 MCG: 100 INJECTION, SOLUTION INTRAVENOUS at 16:03

## 2023-11-19 RX ADMIN — DEXMEDETOMIDINE HYDROCHLORIDE 20 MCG: 100 INJECTION, SOLUTION INTRAVENOUS at 16:07

## 2023-11-19 RX ADMIN — FENTANYL CITRATE 100 MCG: 50 INJECTION INTRAMUSCULAR; INTRAVENOUS at 16:58

## 2023-11-19 RX ADMIN — Medication 600 ML/HR: at 16:56

## 2023-11-19 RX ADMIN — DOCUSATE SODIUM AND SENNOSIDES 2 TABLET: 8.6; 5 TABLET, FILM COATED ORAL at 21:10

## 2023-11-19 RX ADMIN — HYDROMORPHONE HYDROCHLORIDE 0.4 MG: 0.2 INJECTION, SOLUTION INTRAMUSCULAR; INTRAVENOUS; SUBCUTANEOUS at 20:12

## 2023-11-19 RX ADMIN — KETOROLAC TROMETHAMINE 30 MG: 30 INJECTION, SOLUTION INTRAMUSCULAR; INTRAVENOUS at 23:14

## 2023-11-19 RX ADMIN — Medication 2 G: at 16:04

## 2023-11-19 RX ADMIN — BUPIVACAINE HYDROCHLORIDE 20 ML: 2.5 INJECTION, SOLUTION EPIDURAL; INFILTRATION; INTRACAUDAL at 17:39

## 2023-11-19 RX ADMIN — ONDANSETRON 4 MG: 2 INJECTION INTRAMUSCULAR; INTRAVENOUS at 16:05

## 2023-11-19 RX ADMIN — PROPOFOL 30 MG: 10 INJECTION, EMULSION INTRAVENOUS at 17:30

## 2023-11-19 RX ADMIN — KETOROLAC TROMETHAMINE 30 MG: 30 INJECTION, SOLUTION INTRAMUSCULAR at 17:30

## 2023-11-19 RX ADMIN — ACETAMINOPHEN 975 MG: 325 TABLET, FILM COATED ORAL at 15:25

## 2023-11-19 RX ADMIN — GLYCOPYRROLATE 0.2 MG: 0.2 INJECTION, SOLUTION INTRAMUSCULAR; INTRAVENOUS at 16:05

## 2023-11-19 RX ADMIN — PROPOFOL 50 MG: 10 INJECTION, EMULSION INTRAVENOUS at 17:01

## 2023-11-19 RX ADMIN — Medication 500 MG: at 16:35

## 2023-11-19 RX ADMIN — SODIUM CHLORIDE, POTASSIUM CHLORIDE, SODIUM LACTATE AND CALCIUM CHLORIDE: 600; 310; 30; 20 INJECTION, SOLUTION INTRAVENOUS at 15:40

## 2023-11-19 RX ADMIN — DEXMEDETOMIDINE HYDROCHLORIDE 20 MCG: 100 INJECTION, SOLUTION INTRAVENOUS at 16:15

## 2023-11-19 RX ADMIN — BUPIVACAINE 20 ML: 13.3 INJECTION, SUSPENSION, LIPOSOMAL INFILTRATION at 17:39

## 2023-11-19 RX ADMIN — DEXMEDETOMIDINE HYDROCHLORIDE 20 MCG: 100 INJECTION, SOLUTION INTRAVENOUS at 17:51

## 2023-11-19 RX ADMIN — PROPOFOL 60 MG: 10 INJECTION, EMULSION INTRAVENOUS at 17:34

## 2023-11-19 RX ADMIN — METHYLERGONOVINE MALEATE 200 MCG: 0.2 INJECTION INTRAVENOUS at 16:58

## 2023-11-19 RX ADMIN — PHENYLEPHRINE HYDROCHLORIDE 100 MCG: 10 INJECTION INTRAVENOUS at 16:50

## 2023-11-19 RX ADMIN — ACETAMINOPHEN 975 MG: 325 TABLET, FILM COATED ORAL at 21:10

## 2023-11-19 RX ADMIN — FENTANYL CITRATE 50 MCG: 50 INJECTION, SOLUTION INTRAMUSCULAR; INTRAVENOUS at 18:26

## 2023-11-19 RX ADMIN — OXYCODONE HYDROCHLORIDE 5 MG: 5 TABLET ORAL at 21:10

## 2023-11-19 RX ADMIN — PROPOFOL 200 MG: 10 INJECTION, EMULSION INTRAVENOUS at 16:50

## 2023-11-19 RX ADMIN — SODIUM CHLORIDE, POTASSIUM CHLORIDE, SODIUM LACTATE AND CALCIUM CHLORIDE 500 ML: 600; 310; 30; 20 INJECTION, SOLUTION INTRAVENOUS at 14:32

## 2023-11-19 RX ADMIN — ACYCLOVIR 400 MG: 400 TABLET ORAL at 07:54

## 2023-11-19 RX ADMIN — SUCCINYLCHOLINE CHLORIDE 120 MG: 20 INJECTION, SOLUTION INTRAMUSCULAR; INTRAVENOUS; PARENTERAL at 16:50

## 2023-11-19 RX ADMIN — MIDAZOLAM 2 MG: 1 INJECTION INTRAMUSCULAR; INTRAVENOUS at 16:57

## 2023-11-19 ASSESSMENT — ACTIVITIES OF DAILY LIVING (ADL)
ADLS_ACUITY_SCORE: 18

## 2023-11-19 ASSESSMENT — LIFESTYLE VARIABLES: TOBACCO_USE: 1

## 2023-11-19 NOTE — ANESTHESIA PROCEDURE NOTES
TAP Procedure Note    Pre-Procedure   Staff -        Anesthesiologist:  Zainab Galdamez MD       Resident/Fellow: Ani Ca MD       Performed By: resident       Location: OB       Pre-Anesthestic Checklist: patient identified, IV checked, site marked, risks and benefits discussed, informed consent, monitors and equipment checked, pre-op evaluation, at physician/surgeon's request and post-op pain management  Timeout:       Correct Patient: Yes        Correct Procedure: Yes        Correct Site: Yes        Correct Position: Yes        Correct Laterality: Yes        Site Marked: Yes  Procedure Documentation  Procedure: TAP       Diagnosis: POST OPERATIVE PAIN       Laterality: bilateral       Patient Position: supine       Patient Prep/Sterile Barriers: sterile gloves, mask       Skin prep: Chloraprep       Needle Type: short bevel       Needle Gauge: 21.        Needle Length (millimeters): 110        Ultrasound guided       1. Ultrasound was used to identify targeted nerve, plexus, vascular marker, or fascial plane and place a needle adjacent to it in real-time.       2. Ultrasound was used to visualize the spread of anesthetic in close proximity to the above referenced structure.       3. A permanent image is entered into the patient's record.    Assessment/Narrative         The placement was negative for: blood aspirated, painful injection and site bleeding       Paresthesias: No.       Bolus given via needle..        Secured via.        Insertion/Infusion Method: Single Shot       Complications: none       Injection made incrementally with aspirations every 5 mL.    Medication(s) Administered   Bupivacaine 0.25% PF (Infiltration) - Infiltration   20 mL - 11/19/2023 5:39:00 PM  Bupivacaine liposome (Exparel) 1.3% LA inj susp (Infiltration) - Infiltration   20 mL - 11/19/2023 5:39:00 PM    FOR [facility] ([facility] ONLY:   Pain Team Contact information: please page the Pain Team Via [employer]. Search  "\"Pain\". During daytime hours, please page the attending first. At night please page the resident first.      "

## 2023-11-19 NOTE — PROVIDER NOTIFICATION
11/19/23 0400   Uterine Activity Assessment   Method none;palpation;per patient report   Uterine Resting Tone soft by palpation   Fetal Assessment   Fetal Movement active  (per patient)   Intermittent Auscultation   IA Baseline Rate 130   IA Rhythm Regular   IA Increases Present   IA Decreases Not present   Membranes   Membrane Status Intact       Patient reports no pain/contractions/cramping and states +FM. Denies bleeding, leaking, and LOF. Patient able to sleep in between cares. VSS: BP 94/50 (right arm while patient lying on left side), HR 72 and temp 97.8F. Patient verbalized understanding of s/sx's to report to RN. Resting comfortably. Will continue to monitor and assess.    Ariana Summers RN on 11/19/2023 at 4:34 AM

## 2023-11-19 NOTE — ANESTHESIA PROCEDURE NOTES
Airway       Patient location during procedure: OR       Procedure Start/Stop Times: 11/19/2023 4:50 PM  Staff -        Anesthesiologist:  Zainab Galdamez MD       Resident/Fellow: Ani Ca MD       Performed By: resident  Consent for Airway        Urgency: emergent       Consent: The procedure was performed in an emergent situation.  Indications and Patient Condition       Indications for airway management: catarina-procedural       Induction type:RSI       Mask difficulty assessment: 0 - not attempted    Final Airway Details       Final airway type: endotracheal airway       Successful airway: ETT - single  Endotracheal Airway Details        ETT size (mm): 7.0       Cuffed: yes       Successful intubation technique: video laryngoscopy       VL Blade Size: MAC 3       Grade View of Cords: 1       Adjucts: stylet       Position: Right       Measured from: gums/teeth       Secured at (cm): 21       Bite block used: None    Post intubation assessment        Placement verified by: capnometry, equal breath sounds and chest rise        Number of attempts at approach: 1       Number of other approaches attempted: 0       Secured with: tape       Ease of procedure: easy       Dentition: Unchanged    Medication(s) Administered   Medication Administration Time: 11/19/2023 4:50 PM

## 2023-11-19 NOTE — PROGRESS NOTES
Progress note    Consulted by JOSIAH Ovalles and requested by the patient to talk to someone on the MD team about shoulder dystocia. The patient was advised about the nature of shoulder dystocia, and that, while there are maneuvers to reduce it, shoulder dystocia is an obstetric emergency. The patient was advised that there is no accurate method of predicting SD, only risk factors that increase the likelihood that it may occur, including having an LGA baby and AC > 99th %ile.    The patient was advised on the risks of SD including potentially irreversible HIE, potentially irreversible MSK injuries including broken humerus, clavicle, or brachial plexus injuries, and in rare situations, fetal demise. The patient asked questions about the experience of providers on the unit with managing shoulder dystocia.   Ms. Salgado shared that her last delivery via  section was traumatic, and that she lamented her inability to breastfeed afterwards. She was advised that breastfeeding after a  section is an option and that there are lactation consultants available to help.   After hearing all the risks of shoulder dystocia and her increased risk factors, the patient opted to continue with induction of labor.    The patient was discussed with Dr. Elisabeth Barclay MD  Obstetrics and Gynecology, PGY-1  23     I discussed Mi Salgado on 2023 with Dr. Barclay and agree with the presentation, exam and plan of care documented in this note with edits by me.   Belen Barber MD MPH

## 2023-11-19 NOTE — PROGRESS NOTES
To bedside with Dr. Murillo.  Bedside ultrasound performed. Remains breech presentation.    Pt has considered options and desires repeat  section.   Questions answered. Will proceed.     BABITA Fraser, SANCHEZM

## 2023-11-19 NOTE — ANESTHESIA PROCEDURE NOTES
"Intrathecal injection Procedure Note    Pre-Procedure   Staff -        Anesthesiologist:  Zainab Galdamez MD       Resident/Fellow: Ani Ca MD       Performed By: anesthesiologist       Location: OR       Pre-Anesthestic Checklist: patient identified, IV checked, risks and benefits discussed, informed consent, monitors and equipment checked, pre-op evaluation, at physician/surgeon's request and post-op pain management  Timeout:       Correct Patient: Yes        Correct Procedure: Yes        Correct Site: Yes        Correct Position: Yes   Procedure Documentation  Procedure: intrathecal injection       Diagnosis: analgesia       Patient Position: sitting       Patient Prep/Sterile Barriers: sterile gloves, mask, patient draped       Skin prep: Chloraprep       Insertion Site: L3-4. (midline approach).       Needle Gauge: 25.        Needle Length (Inches): 3.5        Spinal Needle Type: Pencan       Introducer used       Introducer: 20 G       # of attempts: 3 and  # of redirects:  3    Assessment/Narrative         Opening pressure was cmH2O while  Sitting.       Comments:  Spinal attempted multiple times at two levels. But the patient requested general anesthesia and the spinal was not completed. Risks and benefits of GA versus spinal were discussed. The patient understood and requested a general.       FOR [facility] ([facility]) ONLY:   Pain Team Contact information: please page the Pain Team Via [employer]. Search \"Pain\". During daytime hours, please page the attending first. At night please page the resident first.      "

## 2023-11-19 NOTE — PLAN OF CARE
Shift Update:    Data: TOLAC with cook catheter in place. VSS. EFM category I and intermittent contractions on monitor; patient reports occasional cramping and lower back pain. Abdomen palpates soft. Patient reports +FM, denies bleeding, leaking, LOF.  Action: Cook catheter removed at 2155 by writer with provider at bedside. SVE completed by provider: % at which time provider performed bedside US followed by beside assessment by Dr. Beaver for confirmation of new breech presentation (see provider notes). Providers discussed options, risks, and outcomes with patient and partner (see provider notes for details).   Plan/Response: Plan per patient and providers are to rest, perform IA overnight every 2-4 hours when patient awake, and reassess fetal presentation and plans for possible version vs.  section in the morning. Patient instructed to be NPO and report any changes overnight including bleeding, leaking, LOF, pain, contractions, and any concerns to writer. Patient verbalized understanding and agreement with new care plan. Will continue to monitor and assess.    Ariana Summers RN on 2023 at 12:24 AM
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
## 2023-11-19 NOTE — CONSULTS
"OB Consult Note    Contacted by CNM service to assess Mi Salgado for breech presentation while undergoing IOL in the setting of moderate polyhydramnios and history of prior  section. OB team was previously consulted to discuss SD risks with patient earlier during induction course (see separate note for further details).    S: Patient states she was cephalic on presentation by US. Wondering how this happened. Wants a vaginal delivery. Traumatic birth experience with last pregnancy via  section. Felt she was unable to bond with her infant. Desires GETA if she needs another  section. Planning epidural in labor. States spinal was too painful to have it done again. Does not feel strong contractions, no LOF. Last ate at 9 pm.    Pregnancy notable for:  - H/o CS x1 (arrest of dilation)  - Moderate polyhydramnios  - LGA  - MDD, ADHD  - GBS positive  - H/o gHTN  - BMI >35  - H/o genital HSV    O:  /72 (BP Location: Right arm, Patient Position: Semi-Ford's, Cuff Size: Adult Regular)   Pulse 95   Temp 98.1  F (36.7  C) (Oral)   Resp 18   Ht 1.676 m (5' 6\")   Wt 108.9 kg (240 lb)   LMP 02/10/2023   SpO2 98%   BMI 38.74 kg/m    Gen: NAD  SVE per CNM: /-3, BBOW    BSUS: Fetal head in RUQ. Breech presentation.    FHT: 130 bpm baseline, mod variability, + accels, - decels  [place]: rare contraction by external monitor    A/P: 28 year old yo  at 39w2d by 7w4d US here for IOL with the CNM service. Consulted by CNM service for evaluation of breech presentation and management options.     We had an extensive discussion regarding fetal breech presentation and inability to proceed with IOL given this. Although one prior  section is not an absolute contraindication to external cephalic version, she has many risks associated with proceeding with an induction in hopes of a vaginal delivery. The procedure of external cephalic version was discussed in detail. Risks of external " cephalic version to include placental abruption, rupture of membranes w/ umbilical cord prolapse, and nonreassuring fetal heart tracing were discussed.     Given her NPO status and clinically stable without signs of labor, not appropriate to perform ECV at this time. Discussed possibility of ECV tomorrow morning vs proceeding with  section. Strongly encouraged anesthesia at the time of ECV given possibility of the above risks and failure with need to proceed with  section at that time. Patient declines spinal. Open to epidural. Desires general anesthesia if  section necessary. Discussed fetal risks associated with general anesthesia and inability to bond with infant at time of delivery but ultimately would defer the decision to her and the anesthesia team. Again discussed increased risk of shoulder dystocia with vaginal delivery although other risks to continuing IOL such as arrest of dilation or arrest of descent.    After thorough discussion, the patient is unsure how she would like to proceed. Patient to alert team if she decides she would like to move forward with ECV in the AM vs repeat  section. Additionally requested patient alert primary team if signs of labor or SROM as these situations would change current management. Repeat BSUS as clinically indicated to assess fetal presentation.    Discussed with Dr. Barber.    Todd Beaver MD, MPH  Ob/Gyn Resident, PGY-3  23 11:00 PM     I discussed Mi Salgado on 2023 with Dr. Beaver and agree with the presentation, exam and plan of care documented in this note with edits by me.   Belen Barber MD MPH

## 2023-11-19 NOTE — PLAN OF CARE
Data: TOLAC with Cook Catheter intact. VSS. Uterine contractions palpate as mild, and pt states she is coping, declines pain medication. Fetal assessment Category I. Afebrile. Intact, vaginal bleeding/bloody show not present. Signs and symptoms of infection absent.  Signs and symptoms of pre-eclampsia: absent, Support person Briseno present and supportive of patient's desire for TOLAC.  Interventions: Continuous uterine and fetal assessment. Comfort measures include rest, ambulation, and patient using birthing ball intermittently.   Plan: Anticipate . Provide labor/coping assistance as needed by patient and support person. Observe for and notify care provider of indications of progressing labor, need for pain medications, or signs of fetal/maternal compromise.  Goal Outcome Evaluation:      Plan of Care Reviewed With: patient, significant other    Overall Patient Progress: improving
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
## 2023-11-20 LAB
CREAT SERPL-MCNC: 0.63 MG/DL (ref 0.51–0.95)
EGFRCR SERPLBLD CKD-EPI 2021: >90 ML/MIN/1.73M2
HGB BLD-MCNC: 13 G/DL (ref 11.7–15.7)

## 2023-11-20 PROCEDURE — 250N000011 HC RX IP 250 OP 636: Mod: JZ

## 2023-11-20 PROCEDURE — 250N000013 HC RX MED GY IP 250 OP 250 PS 637

## 2023-11-20 PROCEDURE — 36415 COLL VENOUS BLD VENIPUNCTURE: CPT

## 2023-11-20 PROCEDURE — 82565 ASSAY OF CREATININE: CPT | Performed by: OBSTETRICS & GYNECOLOGY

## 2023-11-20 PROCEDURE — 250N000013 HC RX MED GY IP 250 OP 250 PS 637: Performed by: STUDENT IN AN ORGANIZED HEALTH CARE EDUCATION/TRAINING PROGRAM

## 2023-11-20 PROCEDURE — 120N000002 HC R&B MED SURG/OB UMMC

## 2023-11-20 PROCEDURE — 85018 HEMOGLOBIN: CPT

## 2023-11-20 RX ORDER — CHLORAL HYDRATE 500 MG
1000 CAPSULE ORAL DAILY
Status: DISCONTINUED | OUTPATIENT
Start: 2023-11-20 | End: 2023-11-21 | Stop reason: HOSPADM

## 2023-11-20 RX ORDER — UBIDECARENONE 75 MG
100 CAPSULE ORAL DAILY
Status: DISCONTINUED | OUTPATIENT
Start: 2023-11-20 | End: 2023-11-21 | Stop reason: HOSPADM

## 2023-11-20 RX ORDER — VITAMIN B COMPLEX
50 TABLET ORAL DAILY
Status: DISCONTINUED | OUTPATIENT
Start: 2023-11-20 | End: 2023-11-21 | Stop reason: HOSPADM

## 2023-11-20 RX ORDER — HYDROMORPHONE HYDROCHLORIDE 2 MG/1
2-4 TABLET ORAL EVERY 4 HOURS PRN
Status: DISCONTINUED | OUTPATIENT
Start: 2023-11-20 | End: 2023-11-21 | Stop reason: HOSPADM

## 2023-11-20 RX ORDER — CYCLOBENZAPRINE HCL 10 MG
10 TABLET ORAL EVERY 8 HOURS PRN
Status: DISCONTINUED | OUTPATIENT
Start: 2023-11-20 | End: 2023-11-21 | Stop reason: HOSPADM

## 2023-11-20 RX ORDER — PRENATAL VIT/IRON FUM/FOLIC AC 27MG-0.8MG
1 TABLET ORAL DAILY
Status: DISCONTINUED | OUTPATIENT
Start: 2023-11-20 | End: 2023-11-21 | Stop reason: HOSPADM

## 2023-11-20 RX ADMIN — SIMETHICONE 80 MG: 80 TABLET, CHEWABLE ORAL at 18:16

## 2023-11-20 RX ADMIN — MAGNESIUM 64 MG (MAGNESIUM CHLORIDE) TABLET,DELAYED RELEASE 535 MG: at 12:40

## 2023-11-20 RX ADMIN — ACETAMINOPHEN 975 MG: 325 TABLET, FILM COATED ORAL at 22:38

## 2023-11-20 RX ADMIN — ACETAMINOPHEN 975 MG: 325 TABLET, FILM COATED ORAL at 03:26

## 2023-11-20 RX ADMIN — HYDROMORPHONE HYDROCHLORIDE 4 MG: 2 TABLET ORAL at 18:54

## 2023-11-20 RX ADMIN — HYDROXYZINE HYDROCHLORIDE 25 MG: 25 TABLET, FILM COATED ORAL at 18:15

## 2023-11-20 RX ADMIN — HYDROXYZINE HYDROCHLORIDE 25 MG: 25 TABLET, FILM COATED ORAL at 11:40

## 2023-11-20 RX ADMIN — DOCUSATE SODIUM AND SENNOSIDES 2 TABLET: 8.6; 5 TABLET, FILM COATED ORAL at 08:15

## 2023-11-20 RX ADMIN — Medication 1000 MG: at 10:09

## 2023-11-20 RX ADMIN — IBUPROFEN 800 MG: 800 TABLET, FILM COATED ORAL at 18:15

## 2023-11-20 RX ADMIN — HYDROMORPHONE HYDROCHLORIDE 4 MG: 2 TABLET ORAL at 14:16

## 2023-11-20 RX ADMIN — ACETAMINOPHEN 975 MG: 325 TABLET, FILM COATED ORAL at 10:10

## 2023-11-20 RX ADMIN — HYDROMORPHONE HYDROCHLORIDE 0.2 MG: 0.2 INJECTION, SOLUTION INTRAMUSCULAR; INTRAVENOUS; SUBCUTANEOUS at 00:10

## 2023-11-20 RX ADMIN — ACETAMINOPHEN 975 MG: 325 TABLET, FILM COATED ORAL at 15:54

## 2023-11-20 RX ADMIN — HYDROMORPHONE HYDROCHLORIDE 4 MG: 2 TABLET ORAL at 10:09

## 2023-11-20 RX ADMIN — HYDROMORPHONE HYDROCHLORIDE 2 MG: 2 TABLET ORAL at 02:33

## 2023-11-20 RX ADMIN — Medication 50 MCG: at 10:09

## 2023-11-20 RX ADMIN — SIMETHICONE 80 MG: 80 TABLET, CHEWABLE ORAL at 06:07

## 2023-11-20 RX ADMIN — VITAM B12 100 MCG: 100 TAB at 10:10

## 2023-11-20 RX ADMIN — IBUPROFEN 800 MG: 800 TABLET, FILM COATED ORAL at 11:41

## 2023-11-20 RX ADMIN — HYDROMORPHONE HYDROCHLORIDE 2 MG: 2 TABLET ORAL at 06:07

## 2023-11-20 RX ADMIN — HYDROXYZINE HYDROCHLORIDE 50 MG: 25 TABLET, FILM COATED ORAL at 06:06

## 2023-11-20 RX ADMIN — PRENATAL VIT W/ FE FUMARATE-FA TAB 27-0.8 MG 1 TABLET: 27-0.8 TAB at 10:10

## 2023-11-20 RX ADMIN — CYCLOBENZAPRINE 10 MG: 10 TABLET, FILM COATED ORAL at 15:54

## 2023-11-20 RX ADMIN — DOCUSATE SODIUM AND SENNOSIDES 2 TABLET: 8.6; 5 TABLET, FILM COATED ORAL at 21:17

## 2023-11-20 RX ADMIN — SIMETHICONE 80 MG: 80 TABLET, CHEWABLE ORAL at 11:41

## 2023-11-20 RX ADMIN — HYDROMORPHONE HYDROCHLORIDE 4 MG: 2 TABLET ORAL at 22:38

## 2023-11-20 RX ADMIN — ENOXAPARIN SODIUM 40 MG: 40 INJECTION SUBCUTANEOUS at 10:10

## 2023-11-20 RX ADMIN — HYDROXYZINE HYDROCHLORIDE 25 MG: 25 TABLET, FILM COATED ORAL at 00:11

## 2023-11-20 RX ADMIN — KETOROLAC TROMETHAMINE 30 MG: 30 INJECTION, SOLUTION INTRAMUSCULAR; INTRAVENOUS at 05:40

## 2023-11-20 ASSESSMENT — ACTIVITIES OF DAILY LIVING (ADL)
ADLS_ACUITY_SCORE: 18

## 2023-11-20 NOTE — PLAN OF CARE
"Goal Outcome Evaluation:      Plan of Care Reviewed With: patient    Overall Patient Progress: improvingOverall Patient Progress: improving    VSS and postpartum assessments WDL.  Up ad bella with steady gait and independent with cares. Cruz removed at 0100, pt has voided x1. Bonding well with infant.  Breastfeeding on cue with moderate assistance and giving EBM and donor milk to infant 3-7mL, pt also pumping.  Pain managed with Tylenol, Toradol, Dilaudid, Atarax, Simethicone, ice pack, and heat packs.  Significant other, Briseno present and supportive.  Will continue with postpartum cares and education per plan of care.         Problem: Adult Inpatient Plan of Care  Goal: Plan of Care Review  Description: The Plan of Care Review/Shift note should be completed every shift.  The Outcome Evaluation is a brief statement about your assessment that the patient is improving, declining, or no change.  This information will be displayed automatically on your shift  note.  Outcome: Progressing  Flowsheets (Taken 11/20/2023 0643)  Plan of Care Reviewed With: patient  Overall Patient Progress: improving  Goal: Patient-Specific Goal (Individualized)  Description: You can add care plan individualizations to a care plan. Examples of Individualization might be:  \"Parent requests to be called daily at 9am for status\", \"I have a hard time hearing out of my right ear\", or \"Do not touch me to wake me up as it startles  me\".  Outcome: Progressing  Goal: Absence of Hospital-Acquired Illness or Injury  Outcome: Progressing  Intervention: Identify and Manage Fall Risk  Recent Flowsheet Documentation  Taken 11/19/2023 2100 by Liliam Shore, RN  Safety Promotion/Fall Prevention:   activity supervised   assistive device/personal items within reach   nonskid shoes/slippers when out of bed   patient and family education  Intervention: Prevent Skin Injury  Recent Flowsheet Documentation  Taken 11/20/2023 0520 by Liliam Shore, RN  Body " Position: position changed independently  Taken 2023 0445 by Liliam Shore RN  Body Position: position changed independently  Taken 2023 0107 by Liliam Shore RN  Body Position: position changed independently  Taken 2023 0010 by Liliam Shore RN  Body Position: position changed independently  Taken 2023 2314 by Liliam Shore RN  Body Position: position changed independently  Taken 2023 2210 by Liliam Shore RN  Body Position: position changed independently  Taken 2023 2100 by Liliam Shore RN  Body Position: position changed independently  Intervention: Prevent and Manage VTE (Venous Thromboembolism) Risk  Recent Flowsheet Documentation  Taken 2023 by Liliam Shore RN  VTE Prevention/Management: SCDs (sequential compression devices) on  Intervention: Prevent Infection  Recent Flowsheet Documentation  Taken 2023 by Liliam Shore RN  Infection Prevention:   hand hygiene promoted   rest/sleep promoted  Goal: Optimal Comfort and Wellbeing  Outcome: Progressing  Intervention: Provide Person-Centered Care  Recent Flowsheet Documentation  Taken 2023 by Liliam Shore RN  Trust Relationship/Rapport:   care explained   choices provided   questions answered   questions encouraged   reassurance provided   thoughts/feelings acknowledged  Goal: Readiness for Transition of Care  Outcome: Progressing     Problem: Postpartum ( Delivery)  Goal: Successful Parent Role Transition  Outcome: Progressing  Intervention: Support Parent Role Transition  Recent Flowsheet Documentation  Taken 2023 by Liliam Shore RN  Supportive Measures:   active listening utilized   decision-making supported   goal-setting facilitated   positive reinforcement provided   relaxation techniques promoted   self-care encouraged   verbalization of feelings encouraged  Parent-Child Attachment Promotion:   caring  behavior modeled   cue recognition promoted   face-to-face positioning promoted   interaction encouraged   parent/caregiver presence encouraged   participation in care promoted   positive reinforcement provided   rooming-in promoted   skin-to-skin contact encouraged   strengths emphasized  Goal: Hemostasis  Outcome: Progressing  Goal: Effective Bowel Elimination  Outcome: Progressing  Intervention: Enhance Bowel Motility and Elimination  Recent Flowsheet Documentation  Taken 11/19/2023 2100 by Liliam Shore, RN  Bowel Motility Enhancement:   fluid intake encouraged   oral intake encouraged  Goal: Fluid and Electrolyte Balance  Outcome: Progressing  Goal: Absence of Infection Signs and Symptoms  Outcome: Progressing  Goal: Optimal Pain Control and Function  Outcome: Progressing  Goal: Effective Urinary Elimination  Outcome: Progressing  Goal: Effective Oxygenation and Ventilation  Outcome: Progressing  Intervention: Optimize Oxygenation and Ventilation  Recent Flowsheet Documentation  Taken 11/20/2023 0520 by Liliam Shore RN  Head of Bed (HOB) Positioning: HOB at 30-45 degrees  Taken 11/20/2023 0445 by Liliam Shore RN  Head of Bed (HOB) Positioning: HOB at 30-45 degrees  Taken 11/20/2023 0107 by Liliam Shore RN  Head of Bed (HOB) Positioning: HOB at 30-45 degrees  Taken 11/20/2023 0010 by Liliam Shore RN  Head of Bed (HOB) Positioning: HOB at 30-45 degrees  Taken 11/19/2023 2314 by Liliam Shore RN  Head of Bed (HOB) Positioning: HOB at 30-45 degrees  Taken 11/19/2023 2210 by Liliam Shore RN  Head of Bed (HOB) Positioning: HOB at 30-45 degrees  Taken 11/19/2023 2100 by Liliam Shore RN  Head of Bed (HOB) Positioning: HOB at 30-45 degrees

## 2023-11-20 NOTE — PROGRESS NOTES
OB Postpartum Progress Note    S: Feeling ok this morning, just very sore. Pain is not currently under control. Lochia improving. Tolerating po without nausea or emesis. Passing flatus. Ambulating without dizziness or lightheadedness. Voiding spontaneously without issues.     O:  Patient Vitals for the past 24 hrs:   BP Temp Temp src Pulse Resp SpO2 Oximeter Heart Rate   23 0818 116/70 98.7  F (37.1  C) Oral 82 16 100 % --   23 0445 124/76 98.8  F (37.1  C) Oral 78 17 97 % --   23 0107 114/65 98.5  F (36.9  C) Oral 77 17 96 % --   23 0010 115/70 97.5  F (36.4  C) Oral 78 17 97 % --   23 2314 113/67 97.3  F (36.3  C) Oral 95 18 97 % --   23 2210 115/75 98  F (36.7  C) Oral 72 18 98 % --   23 111/68 97.9  F (36.6  C) Oral 74 17 98 % --   23 125/80 97.9  F (36.6  C) Oral 76 18 -- --   23 119/87 -- -- 82 28 -- --   23 194 117/80 -- -- 67 13 -- --   23 1930 122/83 -- -- 70 14 99 % --   23 1915 126/86 -- -- 74 14 -- --   23 1843 129/86 -- -- 72 21 -- --   23 1830 125/81 -- -- 69 14 -- --   23 1815 (!) 114/100 -- -- 69 17 -- --   23 1800 128/87 -- -- 85 18 99 % --   23 1755 (!) 126/94 (!) 96.3  F (35.7  C) Axillary 85 18 99 % --   23 1110 111/68 98.1  F (36.7  C) Oral -- 16 -- 82 bpm     Gen: NAD. Alert, oriented. Resting comfortably in bed.  CV: Regular rate  Resp: On room air, no increased work of breathing  Abd: Soft, appropriately tender, fundus firm at the umbilicus, appropriately tender  Incision: C/D/I, bandage in place  Ext: trace lower extremity edema bilaterally    Labs:   Hemoglobin   Date Value Ref Range Status   2023 13.2 11.7 - 15.7 g/dL Final   2023 12.7 11.7 - 15.7 g/dL Final   2012 13.2 11.7 - 15.7 g/dL Final       A/P: Mi Salgado is a 28 year old  who is POD#1 s/p RLTCS for breech presentation. Doing well postpartum, working on pain control.    #  Postpartum/Postop  - Pain: Continue scheduled tylenol, ibuprofen and prn dilaudid, will increase dose this AM and add prn flexeril  - Heme: Hgb 13.2>>13.0 no symptoms of anemia.   - GI: Scheduled bowel regimen, prn anti-emetics  - : s/p Cruz. Voiding spontaneously   - PNC: Rh pos, Rubella immune  - Breast feeding; no issues  - Contraception: need to discuss.   - PPx: Encourage ambulation, IS, SCDs while confined to bed. Will start lovenox today    Anticipate discharge POD#2-3; once meeting postpartum discharge goals     Joelle Butt MD  OB/GYN Resident, PGY-3    I personally examined and evaluated Mi Salgado on 11/20/2023.  I discussed the patient with Dr. Butt and agree with the presentation, exam and plan of care documented in this note with edits by me.   Mi is doing better with pain this AM. Working on breastfeeding/hand expressing. Added OTC supplements per patient request. Anticipate discharge to home tomorrow.   Dulce Russell MD

## 2023-11-20 NOTE — DISCHARGE SUMMARY
[facility] Discharge Summary    Mi Salgado MRN# [id number]   Age: 28 year old YOB: 1995     Date of Admission:  2023  Date of Discharge:  2023  Admitting Physician:  Sebastián Tijerina CNM  Discharge Physician:  Genevieve Gomez MD             Admission Diagnoses:    at 39w3d  H/o CS x1 (arrest of dilation)  Moderate polyhydramnios  LGA  MDD, ADHD  GBS positive  H/o gHTN  BMI >35  H/o genital HSV          Discharge Diagnosis:     Same, now , delivered   Breech   H/o CS x1 (arrest of dilation)  Moderate polyhydramnios  LGA  MDD, ADHD  GBS positive  H/o gHTN  BMI >35  H/o genital HSV          Procedures:     Procedure(s): Repeat low transverse  section with single layer closure via Pfannenstiel  skin incision  TAP Block                 Medications Prior to Admission:     Facility-Administered Medications Prior to Admission   Medication Dose Route Frequency Provider Last Rate Last Admin    triamcinolone (KENALOG-40) injection 20 mg  20 mg   Silvano Mathews MD   20 mg at 22 0920     Medications Prior to Admission   Medication Sig Dispense Refill Last Dose    Cholecalciferol (VITAMIN D) 50 MCG ( UT) CAPS Take 1 capsule by mouth daily Take one capsule daily. 90 capsule 3     Omega-3 Fish Oil 500 MG capsule Take 2 capsules (1,000 mg) by mouth daily 90 capsule 3     Prenatal MV-Min-Fe Fum-FA-DHA (PRENATAL MULTIVITAMIN PLUS DHA) 27-0.8-250 MG CAPS Take 1 tablet by mouth daily 90 capsule 3     [DISCONTINUED] acyclovir (ZOVIRAX) 400 MG tablet Take 1 tablet (400 mg) by mouth 3 times daily 60 tablet 1     [DISCONTINUED] aspirin 81 MG EC tablet Take 1 tablet (81 mg) by mouth daily 90 tablet 3     [DISCONTINUED] terconazole (TERAZOL 7) 0.4 % vaginal cream Place 1 applicator vaginally at bedtime for 7 days 1 g 0              Discharge Medications:        Review of your medicines        START taking        Dose / Directions   acetaminophen 325 MG  tablet  Commonly known as: TYLENOL  Used for: S/P  section      Dose: 650 mg  Take 2 tablets (650 mg) by mouth every 6 hours as needed for mild pain Start after Delivery.  Quantity: 100 tablet  Refills: 0     cyclobenzaprine 10 MG tablet  Commonly known as: FLEXERIL  Used for: S/P  section      Dose: 10 mg  Take 1 tablet (10 mg) by mouth every 8 hours as needed for muscle spasms  Quantity: 9 tablet  Refills: 0     HYDROmorphone 2 MG tablet  Commonly known as: DILAUDID  Used for: S/P  section      Dose: 4 mg  Take 2 tablets (4 mg) by mouth every 6 hours as needed for moderate pain  Quantity: 6 tablet  Refills: 0     ibuprofen 600 MG tablet  Commonly known as: ADVIL/MOTRIN  Used for: S/P  section      Dose: 600 mg  Take 1 tablet (600 mg) by mouth every 6 hours as needed for moderate pain Start after delivery  Quantity: 60 tablet  Refills: 0     senna-docusate 8.6-50 MG tablet  Commonly known as: SENOKOT-S/PERICOLACE  Used for: S/P  section      Dose: 1 tablet  Take 1 tablet by mouth daily Start after delivery.  Quantity: 100 tablet  Refills: 0            CONTINUE these medicines which have NOT CHANGED        Dose / Directions   Omega-3 Fish Oil 500 MG capsule  Used for: Supervision of high risk pregnancy in first trimester      Dose: 1,000 mg  Take 2 capsules (1,000 mg) by mouth daily  Quantity: 90 capsule  Refills: 3     Prenatal Multivitamin Plus DHA 27-0.8-250 MG Caps  Used for: Supervision of high risk pregnancy in first trimester      Dose: 1 tablet  Take 1 tablet by mouth daily  Quantity: 90 capsule  Refills: 3     vitamin D 50 MCG ( UT) Caps  Used for: Supervision of high risk pregnancy in third trimester      Dose: 1 capsule  Take 1 capsule by mouth daily Take one capsule daily.  Quantity: 90 capsule  Refills: 3            STOP taking      acyclovir 400 MG tablet  Commonly known as: ZOVIRAX        aspirin 81 MG EC tablet        terconazole 0.4 % vaginal  cream  Commonly known as: TERAZOL 7                  Where to get your medicines        These medications were sent to [facility] [place], [place] - [address]  [address], [facility] [address]      Phone: [phone]   acetaminophen 325 MG tablet  cyclobenzaprine 10 MG tablet  HYDROmorphone 2 MG tablet  ibuprofen 600 MG tablet  senna-docusate 8.6-50 MG tablet               Consultations:   OBGYN  Lactation  Anesthesia   Social Work           Brief Admission History:   Mi Salgado is a 28 year old  at 39w3d admitted for IOL in the setting of moderate polyhydramnios. As patient was a TOLAC, a newby balloon was placed. On expulsion of the balloon, baby was noted to be breech. Therefore, recommendation was to proceed with repeat  section. Patient was The risks, benefits, and alternatives of  section were discussed with the patient, and she agreed to proceed.           Intraoperative course   The procedure was uncomplicated.   mL.  See operative report for details.   Findings:   Mild to moderate recto-fascial adhesions. Minimal, filmy bladder adhesions to lower uterine segment. Reasonable for pfannenstiel incision in future  sections  Copious clear amniotic fluid  Liveborn female infant in breech presentation. Apgars 8 at 1 minute & 9 at 5 minutes. Weight 3.97kg.  Arterial cord pH 7.31, base deficit -1.71. Venous cord pH 7.34, base deficit -1.7.  Normal uterus, fallopian tubes, and ovaries.      Postpartum Course   The patient's hospital course was unremarkable.  She recovered as anticipated and experienced no post-operative complications. On discharge, her pain was well controlled. Vaginal bleeding is similar to peak menstrual flow.  Voiding without difficulty.  Ambulating well and tolerating a normal diet.  No fever or significant wound drainage.  Breastfeeding well.  Infant is stable.  She was discharged on post-partum day  #2.    Post-partum hemoglobin:   Hemoglobin   Date Value Ref Range Status   11/20/2023 13.0 11.7 - 15.7 g/dL Final   12/03/2012 13.2 11.7 - 15.7 g/dL Final             Discharge Instructions and Follow-Up:     Discharge diet: Regular   Discharge activity: No lifting greater than 20 lbs, pushing, pulling, or other strenuous activity for 6 weeks. Pelvic rest for 6 weeks including no sexual intercourse, tampons, or douching. No driving until you can slam on the brakes without pain or while on narcotic pain medications.    Discharge follow-up: Follow up with primary OB for routine postpartum visit in 6 weeks   Wound care: Keep incision clean and dry           Discharge Disposition:     Discharged to home in stable condition      Joelle Butt MD  OB/GYN Resident, PGY-3    [facility] staff:  Appreciate note by Dr. Butt.  I have seen and examined the patient without the resident. I have reviewed, edited, and agree with the note.  Nurse will page me once bandage is off, if there is any concerning findings.       Genevieve Gomez MD, FACOG  11/21/2023  9:05 AM

## 2023-11-20 NOTE — ANESTHESIA CARE TRANSFER NOTE
Patient: Mi Salgado    Procedure: Procedure(s):   section       Diagnosis: * No pre-op diagnosis entered *  Diagnosis Additional Information: No value filed.    Anesthesia Type:   Spinal     Note:    Oropharynx: oropharynx clear of all foreign objects and spontaneously breathing  Level of Consciousness: drowsy  Oxygen Supplementation: nasal cannula    Independent Airway: airway patency satisfactory and stable  Dentition: dentition unchanged  Vital Signs Stable: post-procedure vital signs reviewed and stable  Report to RN Given: handoff report given  Patient transferred to: PACU    Handoff Report: Identifed the Patient, Identified the Reponsible Provider, Reviewed the pertinent medical history, Discussed the surgical course, Reviewed Intra-OP anesthesia mangement and issues during anesthesia, Set expectations for post-procedure period and Allowed opportunity for questions and acknowledgement of understanding      Vitals:  Vitals Value Taken Time   /100 23 1815   Temp 35.7  C (96.3  F) 23 1755   Pulse 75 23 1821   Resp 20 23 1821   SpO2 99 % 23 1800   Vitals shown include unfiled device data.    Electronically Signed By: Zainab Venegas MD  2023  6:22 PM
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
## 2023-11-20 NOTE — PLAN OF CARE
Goal Outcome Evaluation:      Plan of Care Reviewed With: patient, significant other    Overall Patient Progress: improvingOverall Patient Progress: improving    Outcome Evaluation: VSS. Working on pain control for incisional pain. Tylenol, Ibuprofen, Dilaudid, Atarax, and cold packs to assist with pain control. Pt breastfeeding infant on cue with assistance for positioning and latch. Pt tolerating regular diet, ambulating frequently in room and voiding without difficulty. Postpartum checks WNL. Pt and significant other, Briseno, bonding well with infant.    Flexaril also ordered to assist with pain control. Pt trying to go longer in between doses of Dilaudid.     Problem: Adult Inpatient Plan of Care  Goal: Optimal Comfort and Wellbeing  Intervention: Monitor Pain and Promote Comfort  Recent Flowsheet Documentation  Taken 2023 1141 by Nikki Babcock, RN  Pain Management Interventions:   medication (see MAR)   cold applied     Problem: Postpartum ( Delivery)  Goal: Effective Urinary Elimination  Outcome: Progressing

## 2023-11-20 NOTE — PLAN OF CARE
Data: iM Salgado transferred to 7135 via cart at 2025. Baby transferred via parent's arms.  Action: Receiving unit notified of transfer: Yes. Patient and family notified of room change. Report given to Liliam at 2025. Belongings sent to receiving unit. Accompanied by Registered Nurse. Oriented patient to surroundings. Call light within reach. ID bands double-checked with receiving RN.  Response: Patient tolerated transfer and is stable.

## 2023-11-20 NOTE — OP NOTE
[facility]  Full Operative Progress Note     Surgery Date:  2023    Surgeon:  Mi Stafford MD     Assistants:  Tegan Murillo MD, PGY-3      Pre-op Diagnosis:    Intrauterine pregnancy at 39w3d  H/o CS x1 (arrest of dilation)  Breech presentation  Moderate polyhydramnios    Post-op Diagnosis:    Same   Liveborn female infant     Procedure:  Repeat low-transverse  section with single layer uterine closure via pfannenstiel incision    Anesthesia: GETA    EBL:  676 cc    IVF:  1200 mL crystalloid    Drains: Newby Catheter     Specimens:  Cord Blood, Cord Gasses     Complications: None apparent    Indications:   Mi Salgado is a 28 year old  at 39w3d admitted for IOL in the setting of moderate polyhydramnios. As patient was a TOLAC, a newby balloon was placed. On expulsion of the balloon, baby was noted to be breech. Therefore, recommendation was to proceed with repeat  section. Patient was The risks, benefits, and alternatives of  section were discussed with the patient, and she agreed to proceed.     Findings:   Mild to moderate recto-fascial adhesions. Minimal, filmy bladder adhesions to lower uterine segment. Reasonable for pfannenstiel incision in future  sections  Copious clear amniotic fluid  Liveborn female infant in breech presentation. Apgars 8 at 1 minute & 9 at 5 minutes. Weight 3.97kg.  Arterial cord pH 7.31, base deficit -1.71. Venous cord pH 7.34, base deficit -1.7.  Normal uterus, fallopian tubes, and ovaries.     Procedure Details:   The patient was brought to the OR, where spinal anesthesia was attempted without success.  Therefore, decision was made to under go general anesthesia. She was placed in the dorsal supine position with a slight leftward tilt and newby catheter was placed. She was prepped and draped in the usual sterile fashion. A surgical time out was performed. General anesthesia was then administered and a  pfannenstiel skin incision was made with the scalpel, and carried down to the underlying fascia with sharp and blunt dissection. The fascia was incised in the midline, and the incision was extended laterally bluntly. The rectus muscles were  in the midline, and the peritoneum was entered bluntly, and the opening was extended with digital pressure. The bladder blade was placed. A transverse hysterotomy was made with the scalpel in the lower uterine segment, and the incision was extended with digital pressure. The infant was noted to be in the breech position, and was delivered atraumatically. The shoulders delivered easily.  No nuchal cord was noted. The cord was doubly clamped and cut, and the infant was handed off to the awaiting NICU staff. A segment of cord was cut and cord gasses collected. The placenta was delivered with gentle traction on the umbilical cord and uterine massage. The uterus was exteriorized and cleared of all clots and debris. Uterine tone was noted to be boggy and therefore high dose pitocin and methergine were administered through the running IV. The hysterotomy was closed with a running locked suture of 0 Vicryl. The hysterotomy was noted to be hemostatic. The posterior cul-de-sac was cleared of all clots and debris. The uterus was returned to the abdomen. The pericolic gutters were cleared of all clots and debris. The hysterotomy was reexamined and noted to be hemostatic. The fascia was closed with a running 0 Vicryl suture. The subcutaneous tissue was irrigated and areas of oozing were controlled with electrocautery. The subcutaneous tissue was greater than 2 cm in thickness, and was therefore  closed. The skin was closed with 4-0 Monocryl and covered with a sterile dressing.    All sponge, needle, and instrument counts were correct. The patient tolerated the procedure well, and was transferred to recovery in stable condition. Dr. Stafford was present and scrubbed for the entirety  of the procedure.     Tegan Murillo MD  OB/GYN Resident, PGY-3    I was present and scrubbed for entire procedure.   Mi Stafford MD

## 2023-11-20 NOTE — PROGRESS NOTES
"Received order for SW to see regarding \"Anxiety, depression, and possible birth trauma\".    Chart reviewed.  SW consulted with bedside RN, Roxane and requested that she please ask the patient if she would like a social work visit.  Goal is to empower her to choose what is best for her.     Per RN, patient declines SW visit.  Order closed.    Please re-consult SW if patient desires and /or additional needs are identified.    MONIQUE [facility]  Clinical   Maternal Child Health  Phone:  [phone]  Pager:  [phone]   "

## 2023-11-20 NOTE — PLAN OF CARE
Delivered by C/S by Dr. Stafford. Baby delivered @ 1654, delayed cord clamping completed, and brought to prewarmed infant warmer. Infant stimulated and dried. Apgars 8/9. Brought to father after bundling for bonding.

## 2023-11-20 NOTE — PROGRESS NOTES
Patient arrived to [facility] unit via zoom cart at 2025 ,with belongings, accompanied by spouse/ significant other, with infant in arms. Received report from  ZULEIKA Sanders  and checked bands. Unit and room orientation completd. Call light given; no concerns present at this time. Continue with plan of care.

## 2023-11-20 NOTE — PLAN OF CARE
OR to PACU Transfer Note  Data: Pt to OB PACU at 1755 via cart. PIV infusing NS and Oxytocin without complications, newby with 200 urine to gravity, vs WNL, pt does complain of pain and/or nausea.   Interventions: IV to pump, monitors and alarms on, pain medication administered, SCD on.  Response: stable  but reports being in pain..  Plan: Patient instructed to notify RN for pain or nausea, routine post op cares, initiate breastfeeding/pumping as soon as patient/infant able.

## 2023-11-20 NOTE — PROVIDER NOTIFICATION
11/19/23 3209   Provider Notification   Provider Name/Title Daquan/BECKIE   Method of Notification Electronic Page   Request Evaluate-Remote   Notification Reason Medication Request     Pt c/o increased pain with no relief from Oxycodone. Pt requesting Dilaudid, could you order?

## 2023-11-21 VITALS
TEMPERATURE: 99.2 F | DIASTOLIC BLOOD PRESSURE: 59 MMHG | SYSTOLIC BLOOD PRESSURE: 105 MMHG | HEIGHT: 66 IN | RESPIRATION RATE: 16 BRPM | HEART RATE: 79 BPM | OXYGEN SATURATION: 100 % | BODY MASS INDEX: 38.57 KG/M2 | WEIGHT: 240 LBS

## 2023-11-21 PROCEDURE — 250N000013 HC RX MED GY IP 250 OP 250 PS 637

## 2023-11-21 PROCEDURE — 250N000013 HC RX MED GY IP 250 OP 250 PS 637: Performed by: STUDENT IN AN ORGANIZED HEALTH CARE EDUCATION/TRAINING PROGRAM

## 2023-11-21 PROCEDURE — 250N000011 HC RX IP 250 OP 636: Mod: JZ

## 2023-11-21 RX ORDER — HYDROMORPHONE HYDROCHLORIDE 2 MG/1
4 TABLET ORAL EVERY 6 HOURS PRN
Qty: 6 TABLET | Refills: 0 | Status: SHIPPED | OUTPATIENT
Start: 2023-11-21 | End: 2024-01-05

## 2023-11-21 RX ORDER — IBUPROFEN 600 MG/1
600 TABLET, FILM COATED ORAL EVERY 6 HOURS PRN
Qty: 60 TABLET | Refills: 0 | Status: SHIPPED | OUTPATIENT
Start: 2023-11-21 | End: 2024-01-05

## 2023-11-21 RX ORDER — CYCLOBENZAPRINE HCL 10 MG
10 TABLET ORAL EVERY 8 HOURS PRN
Qty: 9 TABLET | Refills: 0 | Status: SHIPPED | OUTPATIENT
Start: 2023-11-21 | End: 2024-01-05

## 2023-11-21 RX ORDER — AMOXICILLIN 250 MG
1 CAPSULE ORAL DAILY
Qty: 100 TABLET | Refills: 0 | Status: SHIPPED | OUTPATIENT
Start: 2023-11-21 | End: 2024-01-05

## 2023-11-21 RX ORDER — ACETAMINOPHEN 325 MG/1
650 TABLET ORAL EVERY 6 HOURS PRN
Qty: 100 TABLET | Refills: 0 | Status: SHIPPED | OUTPATIENT
Start: 2023-11-21 | End: 2024-01-05

## 2023-11-21 RX ADMIN — ACETAMINOPHEN 975 MG: 325 TABLET, FILM COATED ORAL at 05:17

## 2023-11-21 RX ADMIN — IBUPROFEN 800 MG: 800 TABLET, FILM COATED ORAL at 00:50

## 2023-11-21 RX ADMIN — ACETAMINOPHEN 975 MG: 325 TABLET, FILM COATED ORAL at 10:42

## 2023-11-21 RX ADMIN — Medication 50 MCG: at 08:16

## 2023-11-21 RX ADMIN — HYDROMORPHONE HYDROCHLORIDE 4 MG: 2 TABLET ORAL at 02:50

## 2023-11-21 RX ADMIN — HYDROMORPHONE HYDROCHLORIDE 4 MG: 2 TABLET ORAL at 08:36

## 2023-11-21 RX ADMIN — IBUPROFEN 800 MG: 800 TABLET, FILM COATED ORAL at 06:53

## 2023-11-21 RX ADMIN — DOCUSATE SODIUM AND SENNOSIDES 2 TABLET: 8.6; 5 TABLET, FILM COATED ORAL at 08:16

## 2023-11-21 RX ADMIN — ENOXAPARIN SODIUM 40 MG: 40 INJECTION SUBCUTANEOUS at 11:27

## 2023-11-21 RX ADMIN — VITAM B12 100 MCG: 100 TAB at 08:19

## 2023-11-21 RX ADMIN — MAGNESIUM 64 MG (MAGNESIUM CHLORIDE) TABLET,DELAYED RELEASE 535 MG: at 08:15

## 2023-11-21 RX ADMIN — CYCLOBENZAPRINE 10 MG: 10 TABLET, FILM COATED ORAL at 00:50

## 2023-11-21 RX ADMIN — CYCLOBENZAPRINE 10 MG: 10 TABLET, FILM COATED ORAL at 08:36

## 2023-11-21 RX ADMIN — PRENATAL VIT W/ FE FUMARATE-FA TAB 27-0.8 MG 1 TABLET: 27-0.8 TAB at 08:15

## 2023-11-21 RX ADMIN — Medication 1000 MG: at 08:20

## 2023-11-21 RX ADMIN — HYDROXYZINE HYDROCHLORIDE 25 MG: 25 TABLET, FILM COATED ORAL at 00:50

## 2023-11-21 ASSESSMENT — ACTIVITIES OF DAILY LIVING (ADL)
ADLS_ACUITY_SCORE: 18

## 2023-11-21 NOTE — DISCHARGE INSTRUCTIONS
Warning Signs after Having a Baby    Keep this paper on your fridge or somewhere else where you can see it.    Call your provider if you have any of these symptoms up to 12 weeks after having your baby.    Thoughts of hurting yourself or your baby  Pain in your chest or trouble breathing  Severe headache not helped by pain medicine  Eyesight concerns (blurry vision, seeing spots or flashes of light, other changes to eyesight)  Fainting, shaking or other signs of a seizure    Call 9-1-1 if you feel that it is an emergency.     The symptoms below can happen to anyone after giving birth. They can be very serious. Call your provider if you have any of these warning signs.    My provider s phone number: _______________________    Losing too much blood (hemorrhage)    Call your provider if you soak through a pad in less than an hour or pass blood clots bigger than a golf ball. These may be signs that you are bleeding too much.    Blood clots in the legs or lungs    After you give birth, your body naturally clots its blood to help prevent blood loss. Sometimes this increased clotting can happen in other areas of the body, like the legs or lungs. This can block your blood flow and be very dangerous.     Call your provider if you:  Have a red, swollen spot on the back of your leg that is warm or painful when you touch it.   Are coughing up blood.     Infection    Call your provider if you have any of these symptoms:  Fever of 100.4 F (38 C) or higher.  Pain or redness around your stitches if you had an incision.   Any yellow, white, or green fluid coming from places where you had stitches or surgery.    Mood Problems (postpartum depression)    Many people feel sad or have mood changes after having a baby. But for some people, these mood swings are worse.     Call your provider right away if you feel so anxious or nervous that you can't care for yourself or your baby.    Preeclampsia (high blood pressure)    Even if you  didn't have high blood pressure when you were pregnant, you are at risk for the high blood pressure disease called preeclampsia. This risk can last up to 12 weeks after giving birth.     Call your provider if you have:   Pain on your right side under your rib cage  Sudden swelling in the hands and face    Remember: You know your body. If something doesn't feel right, get medical help.     For informational purposes only. Not to replace the advice of your health care provider. Copyright 2020 [facility]. All rights reserved. Clinically reviewed by Zakia Fernandez, RNC-OB, MSN. [employer] 725454 - Rev .    Postop  Birth Instructions    Activity     Do not lift more than 10 pounds for 6 weeks after surgery.  Ask family and friends for help when you need it.  No driving until you have stopped taking your pain medications (usually two weeks after surgery).  No heavy exercise or activity for 6 weeks.  Don't do anything that will put a strain on your surgery site.  Don't strain when using the toilet.  Your care team may prescribe a stool softener if you have problems with your bowel movements.     To care for your incision:     Keep the incision clean and dry.  Do not soak your incision in water. No swimming or hot tubs until it has fully healed. You may soak in the bathtub if the water level is below your incision.  Do not use peroxide, gel, cream, lotion, or ointment on your incision.  Adjust your clothes to avoid pressure on your surgery site (check the elastic in your underwear for example).     You may see a small amount of clear or pink drainage and this is normal.  Check with your health care provider:     If the drainage increases or has an odor.  If the incision reddens, you have swelling, or develop a rash.  If you have increased pain and the medicine we prescribed doesn't help.  If you have a fever above 100.4 F (38 C) with or without chills when placing thermometer under your tongue.    The area around your incision (surgery wound), will feel numb.  This is normal. The numbness should go away in less than a year.     Keep your hands clean:  Always wash your hands before touching your incision (surgery wound). This helps reduce your risk of infection. If your hands aren't dirty, you may use an alcohol hand-rub to clean your hands. Keep your nails clean and short.    Call your healthcare provider if you have any of these symptoms:     You soak a sanitary pad with blood within 1 hour, or you see blood clots larger than a golf ball.  Bleeding that lasts more than 6 weeks.  Vaginal discharge that smells bad.  Severe pain, cramping or tenderness in your lower belly area.  A need to urinate more frequently (use the toilet more often), more urgently (use the toilet very quickly), or it burns when you urinate.  Nausea and vomiting.  Redness, swelling or pain around a vein in your leg.  Problems breastfeeding or a red or painful area on your breast.  Chest pain and cough or are gasping for air.  Problems with coping with sadness, anxiety or depression. If you have concerns about hurting yourself or the baby, call your provider immediately.    You have questions or concerns after you return home.

## 2023-11-21 NOTE — PROGRESS NOTES
OB Postpartum Progress Note    S: Feeling better this morning. Flexeril in combination with po dilaudid is working well for pain management. Lochia improving. Tolerating po without nausea or emesis. Passing flatus. Ambulating without dizziness or lightheadedness. Voiding spontaneously without issues.     O:  Patient Vitals for the past 24 hrs:   BP Temp Temp src Pulse Resp SpO2   23 0520 105/59 99.2  F (37.3  C) Oral 79 16 --   23 2122 110/60 98.3  F (36.8  C) Oral 79 16 --   23 1553 115/68 98  F (36.7  C) Oral 73 16 100 %   23 1238 109/62 98.2  F (36.8  C) Oral 85 16 --   23 0818 116/70 98.7  F (37.1  C) Oral 82 16 100 %     Gen: NAD. Alert, oriented. Resting comfortably in bed.  CV: Regular rate  Resp: On room air, no increased work of breathing  Abd: Soft, appropriately tender, fundus firm at the umbilicus, appropriately tender  Incision: C/D/I, bandage in place  Ext: trace lower extremity edema bilaterally    Labs:   Hemoglobin   Date Value Ref Range Status   2023 13.0 11.7 - 15.7 g/dL Final   2023 13.2 11.7 - 15.7 g/dL Final   2012 13.2 11.7 - 15.7 g/dL Final       A/P: Mi Salgado is a 28 year old  who is POD#2 s/p RLTCS for breech presentation. Doing well postpartum, working on pain control.    # Postpartum/Postop  - Pain: Continue scheduled tylenol, ibuprofen and prn dilaudid, flexeril  - Heme: Hgb 13.2>>13.0 no symptoms of anemia.   - GI: Scheduled bowel regimen, prn anti-emetics  - : s/p Cruz. Voiding spontaneously   - PNC: Rh pos, Rubella immune  - Breast feeding; no issues  - Contraception: condoms.   - PPx: Encourage ambulation, IS, SCDs while confined to bed. Lovenox ppx.    Discharge to home today.    Joelle Butt MD  OB/GYN Resident, PGY-3    [facility] staff:  Appreciate note by Dr. Butt.  I have seen and examined the patient without the resident. I have reviewed, edited, and agree with the note.         Genevieve Gomez MD, FACOG  11/21/2023  9:00 AM

## 2023-11-21 NOTE — LACTATION NOTE
This note was copied from a baby's chart.  Brief Lactation Consult    Met with Mi prior to discharge as parents have questions about supplementation at home.    Mi shares breastfeeding is going well. She is able to get a comfortable latch and continues to hear swallowing when baby Sherry is at the breast.    Pediatricians again reviewed recommendations to monitor Sherry for signs of sedation due to maternal Dilaudid use.     Parents supplementing per their preference and have questions about using donor milk or formula at home.     Education:   - Outpatient donor milk  - Hand Expression/pumping frequency  -  Outpatient lactation resources    Handouts: Infant Feeding Log (Week 1, Your Guide to Postpartum & [place] Care Book), [facility] Lactation Resources, and Pasteurized Donor Human Milk    Plan: Discharging to home today. Mi was encouraged to hand express/pump when supplemental milk given to support her milk supply. Parents plan to supplement at home with formula.     Family encouraged to follow up with outpatient lactation consultant within 1 week due to breastfeeding history, Maternal opioid use and early supplementation.        Luisana Tello RN, IBCLC   Lactation Consultant  Ascom: *[phone]  Office: [phone]

## 2023-11-21 NOTE — PLAN OF CARE
Goal Outcome Evaluation:  Patient is discharge  to home today in a stable condition. She was able to shower and dressing was removed. Incision looks good and inter dry dressing placed.  Discharged education and instructions reviewed by Constance TO, resource,  and meds were also given. Home with baby at 1230

## 2023-11-21 NOTE — PLAN OF CARE
Goal Outcome Evaluation:  VSS and postpartum assessments WDL.  Up ad bella with steady gait and independent with cares.  Bonding well with infant.  Planning to breastfeed, supplemented with 7mL of donor milk overnight due to increased pain.  Pt stated her pain is improving after taking ibuprofen, tylenol, flexeril and dilaudid.  Significant other is present and supportive.  Will continue with postpartum cares and education per plan of care.    Plan of Care Reviewed With: patient    Overall Patient Progress: improvingOverall Patient Progress: improving

## 2023-11-24 ENCOUNTER — TELEPHONE (OUTPATIENT)
Dept: OBGYN | Facility: CLINIC | Age: 28
End: 2023-11-24
Payer: COMMERCIAL

## 2023-11-24 DIAGNOSIS — Z98.891 S/P CESAREAN SECTION: Primary | ICD-10-CM

## 2023-11-24 RX ORDER — HYDROMORPHONE HYDROCHLORIDE 2 MG/1
2 TABLET ORAL EVERY 6 HOURS PRN
Refills: 0 | Status: CANCELLED | OUTPATIENT
Start: 2023-11-24 | End: 2023-11-27

## 2023-11-24 RX ORDER — HYDROMORPHONE HYDROCHLORIDE 2 MG/1
2 TABLET ORAL EVERY 6 HOURS PRN
Qty: 6 TABLET | Refills: 0 | Status: SHIPPED | OUTPATIENT
Start: 2023-11-24 | End: 2023-11-27

## 2023-11-24 NOTE — ANESTHESIA POSTPROCEDURE EVALUATION
Patient: Mi Salgado    Procedure: Procedure(s):   section       Anesthesia Type:  Spinal    Note:  Disposition: Admission   Postop Pain Control: Uneventful            Sign Out: Well controlled pain   PONV: No   Neuro/Psych: Uneventful            Sign Out: Acceptable/Baseline neuro status   Airway/Respiratory: Uneventful            Sign Out: Acceptable/Baseline resp. status   CV/Hemodynamics: Uneventful            Sign Out: Acceptable CV status; No obvious hypovolemia; No obvious fluid overload   Other NRE: NONE   DID A NON-ROUTINE EVENT OCCUR? No           Last vitals:  Vitals Value Taken Time   /81 23 1830   Temp 35.7  C (96.3  F) 23 1755   Pulse 69 23 1830   Resp 14 23 1830   SpO2 99 % 23 1800       Electronically Signed By: Zainab Venegas MD

## 2023-11-24 NOTE — TELEPHONE ENCOUNTER
Mi called into the clinic to request another prescription of dilaudid.    She had a  on  and was discharged with 6 pills of dilaudid. She has found this medication much more helpful than the oxycodone. She's also using flexeril as an adjunct.  Her cramping is still moderate-severe, oftentimes interrupting her sleep.     No signs of acute bleeding or infection that could be contributing to the pain. Will route new prescription of dilaudid to on-call doctor for review.

## 2023-11-24 NOTE — CONFIDENTIAL NOTE
[facility] Call Center    Phone Message    May a detailed message be left on voicemail: yes     Reason for Call: Medication Refill Request    Has the patient contacted the pharmacy for the refill? Yes   Name of medication being requested: pain meds (stilotin?)  Provider who prescribed the medication: Sebastián Tijerina CNM   Pharmacy:   [id number] [address] AT [facility]     Date medication is needed: asap    Action Taken: Message routed to:  Other: Whs    Travel Screening: Not Applicable

## 2023-11-29 ENCOUNTER — TELEPHONE (OUTPATIENT)
Dept: OBGYN | Facility: CLINIC | Age: 28
End: 2023-11-29
Payer: COMMERCIAL

## 2023-11-29 NOTE — TELEPHONE ENCOUNTER
[facility] Call [facility]    Phone Message    May a detailed message be left on voicemail: yes     Reason for Call: Other: Pt called to make appointment for PP visit. Pt currently scheduled with Sebastián MARTINEZ Pt would like to ask a few questions regarding her incision. Please contact pt when able.     Action Taken: Message routed to:  Other: JENNIFER S    Travel Screening: Not Applicable

## 2023-11-29 NOTE — TELEPHONE ENCOUNTER
Called and spoke with the patient to go over her questions regarding her incision.    Patient just wanted to know does she take her steri strips off or do they fall off.    I did let her know that we do like them to be on for at least 2 weeks to give her incision time to heal. Once they start to fall off she can just remove them. Patient also wanted to know if we have a lactation consultant as well. I did give her the number to June Mock to call [phone] she is located on the 7th floor.    All questions were answered.

## 2023-12-21 DIAGNOSIS — O09.91 SUPERVISION OF HIGH RISK PREGNANCY IN FIRST TRIMESTER: ICD-10-CM

## 2023-12-21 RX ORDER — METAPROTERENOL SULFATE 10 MG
1000 TABLET ORAL DAILY
Qty: 90 CAPSULE | Refills: 3 | Status: SHIPPED | OUTPATIENT
Start: 2023-12-21

## 2023-12-21 NOTE — TELEPHONE ENCOUNTER
Received refill request for fish oil, last seen in clinic 11/6/23. Refilled request per protocol.

## 2023-12-26 ENCOUNTER — TELEPHONE (OUTPATIENT)
Dept: OBGYN | Facility: CLINIC | Age: 28
End: 2023-12-26
Payer: COMMERCIAL

## 2023-12-26 NOTE — TELEPHONE ENCOUNTER
[facility] Call Center    Phone Message    May a detailed message be left on voicemail: yes     Reason for Call: Form or Letter   Type or form/letter needing completion: Return to work note  Provider: Steph Pillai CNM  Date form needed: ASAP  Once completed: Patient will  at .    Action Taken: Other: [facility]    Travel Screening: Not Applicable

## 2023-12-27 NOTE — TELEPHONE ENCOUNTER
Mi is hoping for a return to work note.  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  She states that she does not have heavy lifting or strenuous activity.     When complete, would like this emailed to her at [email or web address].  she is aware that this is not a secure method of communication.

## 2023-12-27 NOTE — CONFIDENTIAL NOTE
Mi is hoping for a return to work note.  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  She states that she does not have heavy lifting or strenuous activity.     When complete, would like this emailed to her at [email or web address].  she is aware that this is not a secure method of communication.

## 2024-01-03 ENCOUNTER — PRENATAL OFFICE VISIT (OUTPATIENT)
Dept: OBGYN | Facility: CLINIC | Age: 29
End: 2024-01-03
Attending: OBSTETRICS & GYNECOLOGY
Payer: COMMERCIAL

## 2024-01-03 VITALS
HEIGHT: 66 IN | HEART RATE: 57 BPM | DIASTOLIC BLOOD PRESSURE: 79 MMHG | BODY MASS INDEX: 38.74 KG/M2 | SYSTOLIC BLOOD PRESSURE: 117 MMHG

## 2024-01-03 DIAGNOSIS — T81.31XA POSTOPERATIVE DEHISCENCE OF SKIN WOUND, INITIAL ENCOUNTER: Primary | ICD-10-CM

## 2024-01-03 PROCEDURE — 99213 OFFICE O/P EST LOW 20 MIN: CPT | Mod: 24 | Performed by: OBSTETRICS & GYNECOLOGY

## 2024-01-03 PROCEDURE — G0463 HOSPITAL OUTPT CLINIC VISIT: HCPCS | Performed by: OBSTETRICS & GYNECOLOGY

## 2024-01-03 NOTE — PATIENT INSTRUCTIONS
Thank you for trusting us with your care!     If you need to contact us for questions about:  Symptoms, Scheduling & Medical Questions; Non-urgent (2-3 day response) [place] message, Urgent (needing response today) [phone] (if after 3:30pm next day response)   Prescriptions: Please call your Pharmacy   Billing: [facility] [phone] or [facility] Physicians:[phone]

## 2024-01-04 NOTE — PROGRESS NOTES
"6-week Postpartum Visit:     Assessment:   27yo  at 6 weeks postpartum   Repeat CS 23 for breech presentation  Girl, Sherry Belloy 8lbs 12 oz  Lactating and Formula-feeding mother  Contraceptive Counseling, condoms  EPDS: 3, \"Never\" to #10  Last pap = Pap 2021 NILM, due 2024  PP Hgb = 13.2  Urinary incontinence  Poor wound healing of  scar  Traumatic Birth Experience       Plan:   1. Adjustment to parenting, self care and importance of a support system discussed. Postpartum education given including: postpartum mood changes and postpartum depression. [place]  Mental Health Resource Card given for future reference prn.   2. Return of fertility discussed. Plans condoms for contraception. Education given on this method. Resumption of intercourse reviewed with possible changes in libido and vaginal lubrication while nursing.  -Recommended a more reliable method of birth control given her  birth, advised against another pregnancy for 1 year  3. Discussed resumption of exercise and normal timing of return to pre-pregnancy weight. Postpartum physical activity reviewed and encouraged modified abdominal crunches and Kegels daily. Encouraged integrating exercise, such as walking 20-30mns daily.   4.  Nutrition and supplements reviewed.  Advised continuation of a prenatal or multivitamin, also Vitamin D3 2000 IU geltab daily and an omega 3 fatty acid supplement.  5. Reviewed warning signs of pelvic pain, excessive bleeding or abdominal pain, fever/chills, or signs of breast infection.   6. Breastfeeding support resource list reviewed, declined referral to a lactation consultant  7. Impaired wound healing of  scar: silver nitrate applied to right side of  scar. Reviewed healing with Dr. Gomez. Recommended Keflex 500mg QID x 10 days and Nystatin Powder to rash around  scar  8. Urinary incontinence, unconscious leaking. Accepts referral to pelvic floor physical " "therapy  9. Traumatic birth experience regarding anesthesia experience. Requests that we give feedback to anesthesia staff. Offered Traumatic birth Therapy referrals, Mi declines for now    -Rx given for Keflex and Nystatin and Fish Oil supplements.  -Referral given for Pelvic floor physical therapy.  -Labs today:   TSH/T4    -RTC for routine health maintenance or sooner as needed.       Steph Pillai, BABITA, JOSIAH     Subjective:   Mi Salgado is a 28 year old female who presents for postpartum visit. She is 6 weeks postpartum following a repeat LTCS.  I have fully reviewed the prenatal and intrapartum course. The delivery was at Term and 39/3 weeks gestation Her baby girl is named Sherry Harrison and weighed 8 lbs 12 oz at birth. Reflections on her birth include Feels like her birth was traumatic.   Mi had moderate polyhydramnios, LGA, MDD/ADHD, GBS pos, hx of HTN, h/o genital HSV.   Mi presented for an IOL for moderate polyhydratmnios. A newby balloon was placed and after expulsion baby was found to be breech and after discussion of risks/benefits she decided to proceed with a repeat CS    Mi Salgado reports that the  was traumatic, she was very upset because she was not told that a \"student\" (resident) was going to place the spinal block. They were not able to effectively place the spinal block and she needed general anesthesia. She reports she felt comfortable and safe with the Anesthesiologist who described the spinal block procedure to her in the L & D room, but when she got to the OR a different person was administering the spinal (and was unsuccessful in effective placement). Mi did not feel like she was informed that it would not be the Anesthesiologist who consented her to the spinal procedure. She would like this feedback to be given to the Anesthesiology staff.      Postpartum course has been complicated by poor wound healing of her CS scar and " traumatic birth . Baby's course has been stable. Baby is getting breastmilk twice a day and formula the rest of the time. Mi feels like her milk supply is low and is worried she wouldn't be able to pump enough when she goes back to work at 12 weeks. She is content with giving formula and breastfeeding twice a day, not interested in meeting with a lactation consultant    Lochia ceased at 4 weeks postpartum.  Bowel function is normal. Bladder function is normal. She has resumed intercourse. Desired contraception: condoms. Appetite is normal. Reports sleeping relatively well.  Experiencing urinary leakage, not conscious of the leaking. This happened with Aníbal too, would like to meet with a pelvic floor physical therapist       She has resumed regular exercise. Mi wonders if her  scar is not healing as well because she has returned to the gym. Patient returns to work in 12 weeks pp    Review of Systems  -A 12 point comprehensive review of systems was negative except as noted above.  -Prenatal history and intrapartum course were also reviewed today.  -Social, Medical and family history reviewed    Objective:     Vitals:    24 0904   BP: 113/76   Pulse: 64       Physical Exam:  General Appearance: Alert, cooperative, no distress, appears stated age  Head: Normocephalic, without obvious abnormality, atraumatic  Eyes: Conjunctiva/corneas clear, does not wear corrective lenses  Neck: Supple, symmetrical, trachea midline, no adenopathy  Thyroid: not enlarged, symmetric, no tenderness/mass/nodules  Back: Symmetric, no curvature, ROM normal, no CVA tenderness  Lungs: Clear to auscultation bilaterally, respirations unlabored  Heart: Regular rate and rhythm, S1 and S2 normal, no murmur, rub, or gallop  Breasts: Engorgement resolved/Lactating.  Nipples intact with no cracking.  Abdomen: Soft, non-tender, no masses. Incision  evidence of poor wound healing: slight separation of  scar on right  side. Silver nitrate applied. Erythematous rash above and below incision, appears patchy, some satellite like formation    Pelvic: deferred    Extremities: Extremities normal, atraumatic, no cyanosis or edema  Skin: Skin color, texture, turgor normal, no rashes or lesions. See rash on abdomen above  Lymph nodes: Cervical, supraclavicular, and axillary nodes normal  Neurologic: Alert and oriented x 3.      Immunization History   Administered Date(s) Administered    DTAP (<7y) 1996, 1996, 01/15/1997, 1998, 2000    DTP-Hib 1996, 1996, 01/15/1997    HEPA 2011, 2012    HEPATITIS A (PEDS 12M-18Y) 2011, 2012    HIB (PRP-T) 1996, 1996, 01/15/1997, 1997    HPV 2008, 2011, 2012    HPV Quadrivalent 2008, 2011, 2012    HepB 1996, 1996, 1997    HepB, Unspecified 1996, 1996, 1997    Hepatitis B, Peds 1996, 1996, 1997    Historic Hib Hib-titer 1997    MMR 1997, 2000    Meningococcal (Menomune ) 2008    Meningococcal ACWY (Menactra ) 2008    OPV, trivalent, live 1996, 1996, 01/15/1997    Poliovirus, inactivated (IPV) 1996, 1996, 01/15/1997, 2000    TDAP (Adacel,Boostrix) 2021, 2022, 2023    TDAP Vaccine (Adacel) 2008    Varicella 1998, 2008     Immunization status: declines vaccines      Time spent:  Chart review/Pre-chartin min on day of service  Face-to-face visit: 40   Documentation/consult with MD 10  Total time spent on the day of service: 50 minutes  Additional E & M code for silver nitrate treatment and consult with MD Steph Pillai, BABITA, SANCHEZM

## 2024-01-05 ENCOUNTER — PRENATAL OFFICE VISIT (OUTPATIENT)
Dept: OBGYN | Facility: CLINIC | Age: 29
End: 2024-01-05
Attending: ADVANCED PRACTICE MIDWIFE
Payer: COMMERCIAL

## 2024-01-05 VITALS — SYSTOLIC BLOOD PRESSURE: 113 MMHG | HEART RATE: 64 BPM | DIASTOLIC BLOOD PRESSURE: 76 MMHG

## 2024-01-05 DIAGNOSIS — R21 RASH: ICD-10-CM

## 2024-01-05 DIAGNOSIS — E66.811 CLASS 1 OBESITY DUE TO EXCESS CALORIES WITHOUT SERIOUS COMORBIDITY WITH BODY MASS INDEX (BMI) OF 33.0 TO 33.9 IN ADULT: ICD-10-CM

## 2024-01-05 DIAGNOSIS — E66.09 CLASS 1 OBESITY DUE TO EXCESS CALORIES WITHOUT SERIOUS COMORBIDITY WITH BODY MASS INDEX (BMI) OF 33.0 TO 33.9 IN ADULT: ICD-10-CM

## 2024-01-05 DIAGNOSIS — N39.45 CONTINUOUS LEAKAGE OF URINE: ICD-10-CM

## 2024-01-05 PROCEDURE — G0463 HOSPITAL OUTPT CLINIC VISIT: HCPCS | Performed by: ADVANCED PRACTICE MIDWIFE

## 2024-01-05 PROCEDURE — 99213 OFFICE O/P EST LOW 20 MIN: CPT | Mod: 24 | Performed by: ADVANCED PRACTICE MIDWIFE

## 2024-01-05 PROCEDURE — 99207 PR POST PARTUM EXAM: CPT | Performed by: ADVANCED PRACTICE MIDWIFE

## 2024-01-05 RX ORDER — METAPROTERENOL SULFATE 10 MG
1000 TABLET ORAL DAILY
Qty: 90 CAPSULE | Refills: 4 | Status: SHIPPED | OUTPATIENT
Start: 2024-01-05

## 2024-01-05 RX ORDER — LEVONORGESTREL 1.5 MG/1
1.5 TABLET ORAL ONCE
Qty: 1 TABLET | Refills: 10 | Status: SHIPPED | OUTPATIENT
Start: 2024-01-05 | End: 2024-01-05

## 2024-01-05 RX ORDER — CEPHALEXIN 500 MG/1
500 CAPSULE ORAL 4 TIMES DAILY
Qty: 40 CAPSULE | Refills: 0 | Status: SHIPPED | OUTPATIENT
Start: 2024-01-05 | End: 2024-01-15

## 2024-01-05 RX ORDER — NYSTATIN 100000 [USP'U]/G
POWDER TOPICAL 2 TIMES DAILY PRN
Qty: 60 G | Refills: 0 | Status: SHIPPED | OUTPATIENT
Start: 2024-01-05

## 2024-01-05 NOTE — NURSING NOTE
SUBJECTIVE:   Mi Salgado is here for her 6-week postpartum checkup.     Gibbon score: 3    Delivery Date: 23.    Delivering provider:  Mi Stafford MD.    Type of delivery:  .     Delivery complications: breech  Infant gender:  girl, weight 8 pounds 12 oz.  Feeding Method:   and Bottlefed.  Complications reported with feeding:  inadequate milk supply.    Bleeding:  light.  Duration:  4 weeks.  Menses resumed:  No  Bowel/Urinary problems:  No    Contraception Planned:  None -- is she planning pregnancy? no  She  has had intercourse since delivery and experienced  No discomfort.  .

## 2024-01-07 NOTE — PROGRESS NOTES
Pt here at 6 wks pp with wound slightly opening on right side. No increased pain or bleeding.  No fever.     Small 0.5 cm area that is open superficially.  No s/s infection. No drainage.     Wound cleansed with Betadine.  Probed with sterile qtip.  Strips applied and will see if closes. F/up appt already scheduled with CNM in a few days.     Consider silver nitrate if not coming together then.    Genevieve Gomez MD, FACOG  (she/her/hers)    Department of Ob/Gyn/Women's Health  [facility]  [facility]  [address] [place] [address]  [email or web address]  p. [phone]  f. [phone]

## 2024-01-16 ENCOUNTER — VIRTUAL VISIT (OUTPATIENT)
Dept: ENDOCRINOLOGY | Facility: CLINIC | Age: 29
End: 2024-01-16
Payer: COMMERCIAL

## 2024-01-16 VITALS — HEIGHT: 66 IN | WEIGHT: 233 LBS | BODY MASS INDEX: 37.45 KG/M2

## 2024-01-16 DIAGNOSIS — E66.812 CLASS 2 OBESITY WITHOUT SERIOUS COMORBIDITY WITH BODY MASS INDEX (BMI) OF 37.0 TO 37.9 IN ADULT, UNSPECIFIED OBESITY TYPE: Primary | ICD-10-CM

## 2024-01-16 PROCEDURE — 99215 OFFICE O/P EST HI 40 MIN: CPT | Mod: 95

## 2024-01-16 RX ORDER — PHENTERMINE HYDROCHLORIDE 15 MG/1
15 CAPSULE ORAL EVERY MORNING
Qty: 30 CAPSULE | Refills: 2 | Status: SHIPPED | OUTPATIENT
Start: 2024-01-16 | End: 2024-01-18

## 2024-01-16 NOTE — PROGRESS NOTES
Virtual Visit Check-In    During this virtual visit the patient is located in [place], patient verifies this as the location during the entirety of this visit.     Mi is a 28 year old who is being evaluated via a billable video visit.      How would you like to obtain your AVS? MyChart  If the video visit is dropped, the invitation should be resent by: Text to cell phone: [phone]  Will anyone else be joining your video visit? No        Video-Visit Details    Type of service:  Video Visit   Video Start Time:  7:50am  Video End Time: 8:20am    Originating Location (pt. Location): Home    Distant Location (provider location):  Off-site  Platform used for Video Visit: JAN TavarezT

## 2024-01-16 NOTE — PATIENT INSTRUCTIONS
"Thank you for allowing us the privilege of caring for you. We hope we provided you with the excellent service you deserve.   Please let us know if there is anything else we can do for you so that we can be sure you are completely satisfied with your care experience.    To ensure the quality of our services you may be receiving a patient satisfaction survey from an independent patient satisfaction monitoring company.    The greatest compliment you can give is a \"Likely to Recommend\"    Your visit was with Mine Devlin PA-C today.    Instructions per today's visit:     Mark Salgado, it was great to visit with you today.  Here is a review of our visit.  If our clinic scheduler is not able to reach you please call [phone] to schedule your next appointments.    Plan  Start phentermine 15mg daily, take before 10am each morning   Take your blood pressure 2 weeks after starting this med, update our clinic with results. You can get this done at a pharmacy or with a home blood pressure cuff.   Goals we discussed today: start with smaller portion sizes when starting the phentermine, continuing to go to the gym   Labs ordered today: none  Follow up with Dr. Perera in 3  months   Let us know if you are ever interested in speaking with a dietician to discuss your nutrition   Keep up the excellent work!       Information about Video Visits with [facility]: video visit information  _________________________________________________________________________________________________________________________________________________________  If you are asked by your clinic team to have your blood pressure checked:  [facility] do offer several locations for blood pressure checks. Please follow the below link to schedule an appointment. Scheduling an appointment at the pharmacy for a blood pressure check is now preferred.    Appointment Plus " ([email or web address])  _________________________________________________________________________________________________________________________________________________________  Important contact and scheduling information:  Please call our contact center at [phone] to schedule your next appointments.  To find a lab location near you, please call [phone].  For any nursing questions or concerns call Leonela Bennett LPN at [phone] or Isela Kim RN at [phone]  Please call during clinic hours Monday through Friday 8:00a - 4:00p if you have questions or you can contact us via [employer] at anytime and we will reply during clinic hours.    Lab results will be communicated through My Chart or letter (if My Chart not used). Please call the clinic if you have not received communication after 1 week or if you have any questions.?  Clinic Fax: [phone]    _________________________________________________________________________________________________________________________________________________________  Meal Replacement Products:    Here is the link to our new e-store where you can purchase our meal replacement products    [facility] E-Store  [facility].Client Outlook/store    The one week starter kit is a great way to sample a variety of products and see what works for you.    If you want more information about the product go to: [employer] Meals  [email or web address]    If you are an employee or [facility] or [facility] please contact your care team for a 10% estore discount    Free Shipping for orders over $75     Benefits of meal replacements products:    Portion and calorie control  Improved nutrition  Structured eating  Simplified food choices  Avoid contact with trigger foods  _________________________________________________________________________________________________________________________________________________________  Interested in working with a health  ?  Health coaches work with you to improve your overall health and wellbeing.  They look at the whole person, and may involve discussion of different areas of life, including, but not limited to the four pillars of health (sleep, exercise, nutrition, and stress management). Discuss with your care team if you would like to start working a health .  Health Coaching-3 Pack: Schedule by calling [phone]    $99 for three health coaching visits    Visits may be done in person or via phone    Coaching is a partnership between the  and the client; Coaches do not prescribe or diagnose    Coaching helps inspire the client to reach his/her personal goals   _________________________________________________________________________________________________________________________________________________________  24 Week Healthy Lifestyle Plan:    Our mission in the 24-week Healthy Lifestyle Plan is to provide you with individualized care by giving you the tools, education and support you need to lose weight and maintain a healthy lifestyle. In your 24-week journey, you ll be supported by a dedicated weight loss team that includes registered dietitians, medical weight management providers, health coaches, and nurses -- all with special expertise in weight loss -- to help you every step of the way.     Monthly meetings with your registered dietician or medical weight management provider help to review your progress, update your care plan, and make any adjustments needed to ensure success. Between these visits, weekly and bi-weekly health  visits will help you focus on the four pillars of weight loss -- stress, sleep, nutrition, and exercise -- and how you can best adapt each to achieve sustainable weight loss results.    In addition, you will be given exclusive access to online wellbeing classes through [employer].  Your initial visit will be with a medical weight management provider who will help to  understand your weight loss goals and ensure this program is the right fit for you. Please let our team know if you are interested in the 24 week plan by sending a message to your care team or calling [phone] to schedule.  _________________________________________________________________________________________________________________________________________________________  __________  [facility] Get Moving Program  Our team of physical therapists is trained to help you understand and take control of your condition. They will perform a thorough evaluation to determine your ability for activity and develop a customized plan to fit your goals and physical ability.  Scheduling: Unsure if the Get Moving program is right for you? Discuss the program with your medical provider or diabetes educator. You can also call us at [phone] to ask questions or schedule an appointment.   [facility] Get Moving Program  ____________________________________________________________________________________________________________________________________________________________________________  [facility] Diabetes Prevention Program (DPP)  If you have prediabetes and Medicare please contact us via [employer] System to learn more about the Diabetes Prevention Program (DPP)  Program Details:   [employer] [facility] offers the year-long Diabetes Prevention Program (DPP). The program helps you to make lifestyle changes that prevent or delay type 2 diabetes by supporting healthy eating, increased physical activity, stress reduction and use of coping skills.   On average, previous [facility] DPP cohorts have lost and maintained at least 5% of their starting weight throughout the program and averaged more than 150 minutes of physical activity per week.  Participants meet weekly for one-hour group sessions over sixteen weeks, every other week for the next 8 weeks, and monthly for the last six months.   A year-long  maintenance program is also available for participants who complete the first year.   Location & Cost:   During the COVID-19 Public Health Emergency, the program is offered virtually. When in-person classes can resume, they will be held at [facility].  For people with Medicare, the program is covered in full. A self-pay option will also be available for those with non-Medicare insurance plans.   ______________________________________________________________________________________________________________________________________________________________________________________________________________________________    To work with a Behavioral Health Psychologist:    Call to schedule:    Mohan Mendoza - [phone]  Casey Phillips - [phone]  Linh Marsh - [phone]  Yola Lorenzo - [phone]   Renetta Bray PhD (cannot accept Medicare) [phone]        Thank you,   [facility] Comprehensive Weight Management Team

## 2024-01-16 NOTE — LETTER
2024       RE: Mi Salgado  [address] [facility] [address]     Dear Colleague,    Thank you for referring your patient, Mi Salgado, to the [facility] WEIGHT MANAGEMENT CLINIC [place] at [facility]. Please see a copy of my visit note below.    Virtual Visit Check-In    During this virtual visit the patient is located in [place], patient verifies this as the location during the entirety of this visit.     Mi is a 28 year old who is being evaluated via a billable video visit.      How would you like to obtain your AVS? MyChart  If the video visit is dropped, the invitation should be resent by: Text to cell phone: [phone]  Will anyone else be joining your video visit? No        Video-Visit Details    Type of service:  Video Visit   Video Start Time:  7:50am  Video End Time: 8:20am    Originating Location (pt. Location): Home    Distant Location (provider location):  Off-site  Platform used for Video Visit: Yolanda Campos NREMT            Return Medical Weight Management Note     Mi Salgado  MRN:  [id number]  :  1995  ABBEY:  2024    Dear Physician No Ref-Primary,    I had the pleasure of seeing your patient Mi Salgado. She is a 28 year old female who I am continuing to see for treatment of obesity related to:        2022     8:14 AM   --   I have the following health issues associated with obesity Polycystic Ovarian Syndrome   I have the following symptoms associated with obesity None of the above       Assessment & Plan  Problem List Items Addressed This Visit    None  Visit Diagnoses       Class 2 obesity without serious comorbidity with body mass index (BMI) of 37.0 to 37.9 in adult, unspecified obesity type    -  Primary           Plan  Start phentermine 15mg daily, take before 10am each morning   Take your blood pressure 2 weeks after starting this med, update our clinic with  results. You can get this done at a pharmacy or with a home blood pressure cuff.   Goals we discussed today: smaller portion sizes, continuing to go to the gym   Labs ordered today: none  Follow up with Dr. Perera in 3  months   Dietician appointment offered, patient declined at this time   Keep up the excellent work!     Anti-obesity medication started today for this patient   PHENTERMINE  Was prescribed this by Dr. Perera 1 year ago, never was able to start it due to new pregnancy   No history of hypertension  No history of CVA   No history of cardiovascular disease   No history of cardiac arrhythmias   No history of glaucoma  No history of drug abuse  On birth control condoms and plan B     .     Potential anti-obesity medications for this patient the future if there are issues with cost or side effects  ZEPBOUND  No history of pancreatitis   No personal or family history of medullary thyroid carcinoma  No personal or family history of Multiple Endocrine Neoplasia Type 2  Concern for insurance coverage issues and excessive cost. Could try to send it through insurance in the future.  .    The following medications could be considered with caution for this patient   TOPIRAMATE  No history of kidney stones  No history of glaucoma   No issues with memory  No chronic kidney disease   On birth control condoms and Plan B   Mi expressed concern regarding potential brain fog issues with this medication  .       INTERVAL HISTORY:  New MWM with Dr. Perera 2022, prescribed lomaira, insurance denied. Gained 100 lbsfirst pregnancy at age 26  Prescribed phentermine but she never got to start it due to another pregnancy.     Since last appointment:  Had a baby 2 months ago, is no longer breastfeeding.   Is struggling with over eating and strong appetite. Sleep has been okay given that she is caring for a , she is able to sleep 5-7 hours a nights with naps.   Is not longer breastfeeding, understands that these  medications are not safe during breastfeeding or in future pregnancies. Uses condoms and has plan B.   Is interested in trying phentermine as she was never able to start it 1 year ago.   GLP-1 medications and topiramate were also discussed as options, GLP-1 medications may not be reasonable due to concerns for insurance coverage, Mi expressed concern regarding the risk of brain fog with the topiramate.         Wt Readings from Last 5 Encounters:   24 105.7 kg (233 lb)   23 108.9 kg (240 lb)   10/11/23 105.2 kg (232 lb)   23 106.1 kg (233 lb 14.4 oz)   23 103.4 kg (228 lb)       Anti-obesity medication history    Current:   None     Past/Failed/contraindicated:   Phentermine prescribed 1 year ago- never started     Diet: Eats lots of protein and vegetables, struggles with portion size.   Sleep: Feels she is sleeping relatively well given that she is caring for a   Pregnancy: Not planning on having kids for at least a couple of years, understands that the weight loss medications prescribed in this clinic are not safe during pregnancy     CURRENT WEIGHT:   233 lbs 0 oz    Initial Weight (lbs): 230 lbs  Last Visits Weight: 104.3 kg (230 lb)  Cumulative weight loss (lbs): -3  Weight Loss Percentage: -1.3%         No data to display                      MEDICATIONS:   Current Outpatient Medications   Medication Sig Dispense Refill    Cholecalciferol (VITAMIN D) 50 MCG (2000 UT) CAPS Take 1 capsule by mouth daily Take one capsule daily. 90 capsule 3    nystatin (MYCOSTATIN) 390996 UNIT/GM external powder Apply topically 2 times daily as needed for other (Fungal appearing rash at  incision) 60 g 0    Omega-3 Fish Oil 500 MG capsule Take 2 capsules (1,000 mg) by mouth daily 90 capsule 4    Omega-3 Fish Oil 500 MG capsule Take 2 capsules (1,000 mg) by mouth daily 90 capsule 3    Prenatal MV-Min-Fe Fum-FA-DHA (PRENATAL MULTIVITAMIN PLUS DHA) 27-0.8-250 MG CAPS Take 1 tablet by mouth  "daily 90 capsule 3    levonorgestrel (PLAN B) 1.5 MG tablet Take 1 tablet (1.5 mg) by mouth once for 1 dose 1 tablet 10           1/15/2024     7:12 PM   Weight Loss Medication History Reviewed With Patient   Which weight loss medications are you currently taking on a regular basis? None   If you are not taking a weight loss medication that was prescribed to you, please indicate why: Other   Are you having any side effects from the weight loss medication that we have prescribed you? No                No data to display            Anti-obesity medication ROS:    HEENT  Hx of glaucoma: No    Cardiovascular  CAD:No  HTN:No      Gastrointestinal  GERD:No  Constipation:No  Liver Dz:No  H/O Pancreatitis:No    Psychiatric  Bipolar: No  Anxiety:Yes  Depression:Yes  History of alcohol/drug abuse: No  Hx of eating disorder:No    Endocrine  Personal or family hx of MTC or MEN2:No  Diabetes/prediabetes: No    Neurologic:  Hx of seizures: No  Hx of migraines: No  Memory Impairment: No  CVA history: No        History of kidney stones: No  Kidney disease: No  Current birth control: Has condoms and plan B     Taking Opioid/Narcotic: No        PHYSICAL EXAM:  Objective   Ht 1.676 m (5' 6\")   Wt 105.7 kg (233 lb)   LMP 02/10/2023   BMI 37.61 kg/m             Vitals:  No vitals were obtained today due to virtual visit.  Phone visit, no physical exam performed     Sincerely,    Mine Devlin PA-C      57 minutes spent by me on the date of the encounter doing chart review, history and exam, documentation and further activities per the note    "

## 2024-01-16 NOTE — NURSING NOTE
Chief Complaint   Patient presents with    Follow Up     Return weight management.       Vitals:    01/16/24 0736   Weight: 105.7 kg (233 lb)       Body mass index is 37.61 kg/m .      Hermila Gonzalez, EMT  Surgery Clinic

## 2024-01-16 NOTE — PROGRESS NOTES
Return Medical Weight Management Note     Mi Salgado  MRN:  [id number]  :  1995  ABBEY:  2024    Dear Physician No Ref-Primary,    I had the pleasure of seeing your patient Mi Salgado. She is a 28 year old female who I am continuing to see for treatment of obesity related to:        2022     8:14 AM   --   I have the following health issues associated with obesity Polycystic Ovarian Syndrome   I have the following symptoms associated with obesity None of the above       Assessment & Plan   Problem List Items Addressed This Visit    None  Visit Diagnoses       Class 2 obesity without serious comorbidity with body mass index (BMI) of 37.0 to 37.9 in adult, unspecified obesity type    -  Primary           Plan  Start phentermine 15mg daily, take before 10am each morning   Take your blood pressure 2 weeks after starting this med, update our clinic with results. You can get this done at a pharmacy or with a home blood pressure cuff.   Goals we discussed today: smaller portion sizes, continuing to go to the gym   Labs ordered today: none  Follow up with Dr. Perera in 3  months   Dietician appointment offered, patient declined at this time   Keep up the excellent work!     Anti-obesity medication started today for this patient   PHENTERMINE  Was prescribed this by Dr. Perera 1 year ago, never was able to start it due to new pregnancy   No history of hypertension  No history of CVA   No history of cardiovascular disease   No history of cardiac arrhythmias   No history of glaucoma  No history of drug abuse  On birth control condoms and plan B     .     Potential anti-obesity medications for this patient the future if there are issues with cost or side effects  ZEPBOUND  No history of pancreatitis   No personal or family history of medullary thyroid carcinoma  No personal or family history of Multiple Endocrine Neoplasia Type 2  Concern for insurance coverage issues and excessive cost. Could try  to send it through insurance in the future.  .    The following medications could be considered with caution for this patient   TOPIRAMATE  No history of kidney stones  No history of glaucoma   No issues with memory  No chronic kidney disease   On birth control condoms and Plan B   Mi expressed concern regarding potential brain fog issues with this medication  .       INTERVAL HISTORY:  New MWM with Dr. Perera 2022, prescribed lomaira, insurance denied. Gained 100 lbsfirst pregnancy at age 26  Prescribed phentermine but she never got to start it due to another pregnancy.     Since last appointment:  Had a baby 2 months ago, is no longer breastfeeding.   Is struggling with over eating and strong appetite. Sleep has been okay given that she is caring for a , she is able to sleep 5-7 hours a nights with naps.   Is not longer breastfeeding, understands that these medications are not safe during breastfeeding or in future pregnancies. Uses condoms and has plan B.   Is interested in trying phentermine as she was never able to start it 1 year ago.   GLP-1 medications and topiramate were also discussed as options, GLP-1 medications may not be reasonable due to concerns for insurance coverage, Mi expressed concern regarding the risk of brain fog with the topiramate.         Wt Readings from Last 5 Encounters:   24 105.7 kg (233 lb)   23 108.9 kg (240 lb)   10/11/23 105.2 kg (232 lb)   23 106.1 kg (233 lb 14.4 oz)   23 103.4 kg (228 lb)       Anti-obesity medication history    Current:   None     Past/Failed/contraindicated:   Phentermine prescribed 1 year ago- never started     Diet: Eats lots of protein and vegetables, struggles with portion size.   Sleep: Feels she is sleeping relatively well given that she is caring for a   Pregnancy: Not planning on having kids for at least a couple of years, understands that the weight loss medications prescribed in this clinic are  not safe during pregnancy     CURRENT WEIGHT:   233 lbs 0 oz    Initial Weight (lbs): 230 lbs  Last Visits Weight: 104.3 kg (230 lb)  Cumulative weight loss (lbs): -3  Weight Loss Percentage: -1.3%         No data to display                      MEDICATIONS:   Current Outpatient Medications   Medication Sig Dispense Refill    Cholecalciferol (VITAMIN D) 50 MCG (2000 UT) CAPS Take 1 capsule by mouth daily Take one capsule daily. 90 capsule 3    nystatin (MYCOSTATIN) 218295 UNIT/GM external powder Apply topically 2 times daily as needed for other (Fungal appearing rash at  incision) 60 g 0    Omega-3 Fish Oil 500 MG capsule Take 2 capsules (1,000 mg) by mouth daily 90 capsule 4    Omega-3 Fish Oil 500 MG capsule Take 2 capsules (1,000 mg) by mouth daily 90 capsule 3    Prenatal MV-Min-Fe Fum-FA-DHA (PRENATAL MULTIVITAMIN PLUS DHA) 27-0.8-250 MG CAPS Take 1 tablet by mouth daily 90 capsule 3    levonorgestrel (PLAN B) 1.5 MG tablet Take 1 tablet (1.5 mg) by mouth once for 1 dose 1 tablet 10           1/15/2024     7:12 PM   Weight Loss Medication History Reviewed With Patient   Which weight loss medications are you currently taking on a regular basis? None   If you are not taking a weight loss medication that was prescribed to you, please indicate why: Other   Are you having any side effects from the weight loss medication that we have prescribed you? No                No data to display            Anti-obesity medication ROS:    HEENT  Hx of glaucoma: No    Cardiovascular  CAD:No  HTN:No      Gastrointestinal  GERD:No  Constipation:No  Liver Dz:No  H/O Pancreatitis:No    Psychiatric  Bipolar: No  Anxiety:Yes  Depression:Yes  History of alcohol/drug abuse: No  Hx of eating disorder:No    Endocrine  Personal or family hx of MTC or MEN2:No  Diabetes/prediabetes: No    Neurologic:  Hx of seizures: No  Hx of migraines: No  Memory Impairment: No  CVA history: No        History of kidney stones: No  Kidney disease:  "No  Current birth control: Has condoms and plan B     Taking Opioid/Narcotic: No        PHYSICAL EXAM:  Objective    Ht 1.676 m (5' 6\")   Wt 105.7 kg (233 lb)   LMP 02/10/2023   BMI 37.61 kg/m             Vitals:  No vitals were obtained today due to virtual visit.  Phone visit, no physical exam performed     Sincerely,    DENNIS AcostaC      57 minutes spent by me on the date of the encounter doing chart review, history and exam, documentation and further activities per the note  "

## 2024-01-17 DIAGNOSIS — E66.812 CLASS 2 OBESITY WITHOUT SERIOUS COMORBIDITY WITH BODY MASS INDEX (BMI) OF 37.0 TO 37.9 IN ADULT, UNSPECIFIED OBESITY TYPE: ICD-10-CM

## 2024-01-17 RX ORDER — PHENTERMINE HYDROCHLORIDE 15 MG/1
15 CAPSULE ORAL EVERY MORNING
Qty: 30 CAPSULE | Refills: 2 | Status: CANCELLED | OUTPATIENT
Start: 2024-01-17

## 2024-01-17 NOTE — TELEPHONE ENCOUNTER
phentermine (ADIPEX-P) 15 MG capsule   30 capsule 2 1/16/2024 -- No  Sig - Route: Take 1 capsule (15 mg) by mouth every morning - Oral    Prescribing Provider's NPI: [id number]  Mine Devlin    Last Office Visit : 1-16-24  Future Office visit:  none    Routing refill request to provider for review/approval because:  Controlled med.  Pt request pharm change: Pt call note:  Other request: [facility] doesn't have the medication it is on back order.   Patient wants the medication sent to [facility] .

## 2024-01-17 NOTE — TELEPHONE ENCOUNTER
Reason for Call:  Medication or medication refill:Medication     Do you use a [facility]?  Name of the pharmacy and phone number for the current request:  [facility] [phone]    Name of the medication requested: phentermine (ADIPEX-P) 15 MG capsule     Other request: [facility] doesn't have the medication it is on back order. Patient wants the medication sent to [facility] .      Can we leave a detailed message on this number? YES    Phone number patient can be reached at: Cell number on file:    Telephone Information:   Mobile [phone]       Best Time: Anytime     Call taken on 1/17/2024 at 11:04 AM by Arpita Oneli

## 2024-01-18 DIAGNOSIS — E66.812 CLASS 2 OBESITY WITHOUT SERIOUS COMORBIDITY WITH BODY MASS INDEX (BMI) OF 37.0 TO 37.9 IN ADULT, UNSPECIFIED OBESITY TYPE: Primary | ICD-10-CM

## 2024-01-18 RX ORDER — PHENTERMINE HYDROCHLORIDE 15 MG/1
15 CAPSULE ORAL EVERY MORNING
Qty: 30 CAPSULE | Refills: 2 | Status: SHIPPED | OUTPATIENT
Start: 2024-01-18 | End: 2024-06-04 | Stop reason: SINTOL

## 2024-03-25 DIAGNOSIS — E66.812 CLASS 2 OBESITY WITHOUT SERIOUS COMORBIDITY WITH BODY MASS INDEX (BMI) OF 37.0 TO 37.9 IN ADULT, UNSPECIFIED OBESITY TYPE: Primary | ICD-10-CM

## 2024-03-25 RX ORDER — SEMAGLUTIDE 0.25 MG/.5ML
0.25 INJECTION, SOLUTION SUBCUTANEOUS WEEKLY
Qty: 2 ML | Refills: 0 | Status: SHIPPED | OUTPATIENT
Start: 2024-03-25 | End: 2024-05-17

## 2024-03-25 RX ORDER — SEMAGLUTIDE 0.5 MG/.5ML
0.5 INJECTION, SOLUTION SUBCUTANEOUS WEEKLY
Qty: 2 ML | Refills: 0 | Status: SHIPPED | OUTPATIENT
Start: 2024-03-25 | End: 2024-06-04 | Stop reason: SINTOL

## 2024-03-28 ENCOUNTER — TELEPHONE (OUTPATIENT)
Dept: ENDOCRINOLOGY | Facility: CLINIC | Age: 29
End: 2024-03-28
Payer: COMMERCIAL

## 2024-03-28 NOTE — TELEPHONE ENCOUNTER
PA Initiation    Medication: WEGOVY 0.25 MG/0.5ML SC SOAJ  Insurance Company: [employer] - Phone [phone] Fax [phone]  Pharmacy Filling the Rx: [id number] - [place] - [address] AT [facility] & [address]  Filling Pharmacy Phone: [phone]  Filling Pharmacy Fax: [phone]  Start Date: 3/28/2024         Thank you,     Stevenson Brewer [facility]  Pharmacy Clinic [facility]  [email or web address]   Phone: [phone]  Fax: [phone]

## 2024-03-29 NOTE — TELEPHONE ENCOUNTER
Prior Authorization Approval    Medication: WEGOVY 0.25 MG/0.5ML SC SOAJ  Authorization Effective Date: 3/29/2024  Authorization Expiration Date: 9/29/2024  Approved Dose/Quantity: 2 ml per 28 days  Reference #: [id number]   Insurance Company: [employer] - Phone [phone] Fax [phone]  Expected CoPay: $ 0  CoPay Card Available:      Financial Assistance Needed: No  Which Pharmacy is filling the prescription: [id number] [address] AT [facility] & [address]  Pharmacy Notified: Yes  Patient Notified: Yes - via MyChart        Thank you,     Stevenson Brewer [facility]  Pharmacy Clinic [place]   [email or web address]   Phone: [phone]  Fax: [phone]

## 2024-04-03 ENCOUNTER — TELEPHONE (OUTPATIENT)
Dept: ENDOCRINOLOGY | Facility: CLINIC | Age: 29
End: 2024-04-03
Payer: COMMERCIAL

## 2024-04-03 NOTE — TELEPHONE ENCOUNTER
[facility]    Fax: [phone]    Spec: [phone]    MO: [phone]

## 2024-04-05 ENCOUNTER — TELEPHONE (OUTPATIENT)
Dept: ENDOCRINOLOGY | Facility: CLINIC | Age: 29
End: 2024-04-05
Payer: COMMERCIAL

## 2024-04-05 NOTE — TELEPHONE ENCOUNTER
Reason for Call:  Medication or medication refill:    Do you use a  [facility]?  Name of the pharmacy and phone number for the current request:  [facility]    Name of the medication requested: Wegovy    Other request: patient had received a message about Zepbound on 3/29 please clarify that she is getting Wegovy not Zepbound    Can we leave a detailed message on this number? YES    Phone number patient can be reached at: Cell number on file:    Telephone Information:   Mobile [phone]       Best Time: any    Call taken on 4/5/2024 at 9:19 AM by Joelle Miller

## 2024-04-16 NOTE — TELEPHONE ENCOUNTER
Retail Pharmacy Prior Authorization Team   Phone: [phone]    Prior Authorization Not Needed per Insurance    Medication: WEGOVY 0.25 MG/0.5ML SC SOAJ  Insurance Company: [employer] - Phone [phone] Fax [phone]  Pharmacy Filling the Rx: [facility] - [place], [place] - [address]   Pharmacy Notified: YES  Patient Notified: YES **Instructed pharmacy to notify patient when script is ready to /ship.**

## 2024-04-27 ENCOUNTER — HEALTH MAINTENANCE LETTER (OUTPATIENT)
Age: 29
End: 2024-04-27

## 2024-05-15 DIAGNOSIS — O09.91 SUPERVISION OF HIGH RISK PREGNANCY IN FIRST TRIMESTER: ICD-10-CM

## 2024-05-15 RX ORDER — PNV NO.95/FERROUS FUM/FOLIC AC 28MG-0.8MG
1 TABLET ORAL DAILY
Qty: 90 CAPSULE | Refills: 3 | Status: SHIPPED | OUTPATIENT
Start: 2024-05-15

## 2024-05-17 DIAGNOSIS — E66.812 CLASS 2 OBESITY WITHOUT SERIOUS COMORBIDITY WITH BODY MASS INDEX (BMI) OF 37.0 TO 37.9 IN ADULT, UNSPECIFIED OBESITY TYPE: ICD-10-CM

## 2024-05-17 RX ORDER — SEMAGLUTIDE 0.25 MG/.5ML
0.25 INJECTION, SOLUTION SUBCUTANEOUS WEEKLY
Qty: 2 ML | Refills: 2 | Status: SHIPPED | OUTPATIENT
Start: 2024-05-17 | End: 2024-06-04 | Stop reason: SINTOL

## 2024-06-04 ENCOUNTER — VIRTUAL VISIT (OUTPATIENT)
Dept: ENDOCRINOLOGY | Facility: CLINIC | Age: 29
End: 2024-06-04
Payer: COMMERCIAL

## 2024-06-04 VITALS — HEIGHT: 66 IN | BODY MASS INDEX: 37.63 KG/M2

## 2024-06-04 DIAGNOSIS — E66.812 CLASS 2 OBESITY WITHOUT SERIOUS COMORBIDITY WITH BODY MASS INDEX (BMI) OF 37.0 TO 37.9 IN ADULT, UNSPECIFIED OBESITY TYPE: Primary | ICD-10-CM

## 2024-06-04 PROCEDURE — 99213 OFFICE O/P EST LOW 20 MIN: CPT | Mod: 93 | Performed by: INTERNAL MEDICINE

## 2024-06-04 PROCEDURE — G2211 COMPLEX E/M VISIT ADD ON: HCPCS | Mod: 93 | Performed by: INTERNAL MEDICINE

## 2024-06-04 ASSESSMENT — PAIN SCALES - GENERAL: PAINLEVEL: NO PAIN (0)

## 2024-06-04 NOTE — PROGRESS NOTES
"    Return Medical Weight Management Note     Mi Salgado  MRN:  [id number]  :  1995  ABBEY:  2024    Dear Physician No Ref-Primary,    I had the pleasure of seeing your patient Mi Salgado. She is a 28 year old female who I am continuing to see for treatment of obesity related to: PCOS        2022     8:14 AM   --   I have the following health issues associated with obesity Polycystic Ovarian Syndrome   I have the following symptoms associated with obesity None of the above     Gained 100 lbs after pregnancy. Her baseline weight 171 lbs. Weight was up to 280 lbs after delivery and then lost 50 lbs after delivery. She has tried to lose more but could not despite healthy diet and structured exercise     INTERVAL HISTORY:  Initially seen 2022. Never started medication due to new pregnancy.    Saw Mine LOWERY in 2024. Started phentermine but stopped because of side effect (more anxious and being irritable)  Switched to Wegovy around April but developed extreme nausea, fatigue. Aappetite was completely suppressed. She feels no energy at all. Stopped Wegovy 2 weeks ago and feels better.    She does not want to pursue with anti-obesity medication at this time and would like to work on diet and exercise.    CURRENT WEIGHT:   0 lbs 0 oz    Initial Weight (lbs): 230 lbs  Last Visits Weight: 105.7 kg (233 lb)              2024    12:35 PM   Changes and Difficulties   I have made the following changes to my diet since my last visit: have not been eating a lot,   I have made the following changes to my activity/exercise since my last visit: none       VITALS:  Ht 1.676 m (5' 5.98\")   BMI 37.63 kg/m      MEDICATIONS:   Current Outpatient Medications   Medication Sig Dispense Refill    Cholecalciferol (VITAMIN D) 50 MCG ( UT) CAPS Take 1 capsule by mouth daily Take one capsule daily. 90 capsule 3    nystatin (MYCOSTATIN) 719352 UNIT/GM external powder Apply topically 2 times daily as needed " for other (Fungal appearing rash at  incision) 60 g 0    Omega-3 Fish Oil 500 MG capsule Take 2 capsules (1,000 mg) by mouth daily 90 capsule 4    Prenatal MV-Min-Fe Fum-FA-DHA (PRENATAL MULTIVITAMIN PLUS DHA) 27-0.8-250 MG CAPS Take 1 tablet by mouth daily 90 capsule 3    Semaglutide-Weight Management (WEGOVY) 0.25 MG/0.5ML pen Inject 0.25 mg Subcutaneous once a week 2 mL 2    Semaglutide-Weight Management (WEGOVY) 0.5 MG/0.5ML pen Inject 0.5 mg Subcutaneous once a week After completing 4 weeks of 0.25mg dose 2 mL 0    levonorgestrel (PLAN B) 1.5 MG tablet Take 1 tablet (1.5 mg) by mouth once for 1 dose 1 tablet 10    Omega-3 Fish Oil 500 MG capsule Take 2 capsules (1,000 mg) by mouth daily 90 capsule 3    phentermine (ADIPEX-P) 15 MG capsule Take 1 capsule (15 mg) by mouth every morning (Patient not taking: Reported on 3/24/2024) 30 capsule 2           2024    12:35 PM   Weight Loss Medication History Reviewed With Patient   Which weight loss medications are you currently taking on a regular basis? Wegovy   Are you having any side effects from the weight loss medication that we have prescribed you? Yes   If you are having side effects please describe: nausea (w/higher dose) lost of interest, will speak with provider about it more       ASSESSMENT:   Mi Salgado is a 28 year old female who I am continuing to see for treatment of obesity related to: PCOS    Tried phentermine but stopped due to being more anxious and irritable  Switched to Wegovy but appetite was completely suppressed, extreme nausea and fatigue  Does not want to start any more anti-obesity medication at this time    PLAN:   - continue to work on diet and exercise  - no anti-obesity medications at this time    FOLLOW-UP:    Refer nutritionist.    Patient visit on 2024          Started at 12:41 PM          Ended at 12:48 PM          Total length of 6m32s      Sincerely,    Trever Myers MD

## 2024-06-04 NOTE — PROGRESS NOTES
Virtual Visit Details    Type of service:  Telephone Visit   Originating Location (pt. Location): Home    Distant Location (provider location):  Off-site

## 2024-06-04 NOTE — LETTER
2024       RE: Mi Salgado  [address] [place] [id number]     Dear Colleague,    Thank you for referring your patient, Mi Salgado, to the [facility]NA[facility] [place] [facility]. Please see a copy of my visit note below.        Return Medical Weight Management Note     Mi Salgado  MRN:  [id number]  :  1995  ABBEY:  2024    Dear Physician No Ref-Primary,    I had the pleasure of seeing your patient Mi Salgado. She is a 28 year old female who I am continuing to see for treatment of obesity related to: PCOS        2022     8:14 AM   --   I have the following health issues associated with obesity Polycystic Ovarian Syndrome   I have the following symptoms associated with obesity None of the above     Gained 100 lbs after pregnancy. Her baseline weight 171 lbs. Weight was up to 280 lbs after delivery and then lost 50 lbs after delivery. She has tried to lose more but could not despite healthy diet and structured exercise     INTERVAL HISTORY:  Initially seen 2022. Never started medication due to new pregnancy.    Saw Mine LOWERY in 2024. Started phentermine but stopped because of side effect (more anxious and being irritable)  Switched to Wegovy around April but developed extreme nausea, fatigue. Aappetite was completely suppressed. She feels no energy at all. Stopped Wegovy 2 weeks ago and feels better.    She does not want to pursue with anti-obesity medication at this time and would like to work on diet and exercise.    CURRENT WEIGHT:   0 lbs 0 oz    Initial Weight (lbs): 230 lbs  Last Visits Weight: 105.7 kg (233 lb)              2024    12:35 PM   Changes and Difficulties   I have made the following changes to my diet since my last visit: have not been eating a lot,   I have made the following changes to my activity/exercise since my last visit: none       VITALS:  Ht 1.676 m  "(\")   BMI 37.63 kg/m      MEDICATIONS:   Current Outpatient Medications   Medication Sig Dispense Refill     Cholecalciferol (VITAMIN D) 50 MCG (2000 UT) CAPS Take 1 capsule by mouth daily Take one capsule daily. 90 capsule 3     nystatin (MYCOSTATIN) 884620 UNIT/GM external powder Apply topically 2 times daily as needed for other (Fungal appearing rash at  incision) 60 g 0     Omega-3 Fish Oil 500 MG capsule Take 2 capsules (1,000 mg) by mouth daily 90 capsule 4     Prenatal MV-Min-Fe Fum-FA-DHA (PRENATAL MULTIVITAMIN PLUS DHA) 27-0.8-250 MG CAPS Take 1 tablet by mouth daily 90 capsule 3     Semaglutide-Weight Management (WEGOVY) 0.25 MG/0.5ML pen Inject 0.25 mg Subcutaneous once a week 2 mL 2     Semaglutide-Weight Management (WEGOVY) 0.5 MG/0.5ML pen Inject 0.5 mg Subcutaneous once a week After completing 4 weeks of 0.25mg dose 2 mL 0     levonorgestrel (PLAN B) 1.5 MG tablet Take 1 tablet (1.5 mg) by mouth once for 1 dose 1 tablet 10     Omega-3 Fish Oil 500 MG capsule Take 2 capsules (1,000 mg) by mouth daily 90 capsule 3     phentermine (ADIPEX-P) 15 MG capsule Take 1 capsule (15 mg) by mouth every morning (Patient not taking: Reported on 3/24/2024) 30 capsule 2           2024    12:35 PM   Weight Loss Medication History Reviewed With Patient   Which weight loss medications are you currently taking on a regular basis? Wegovy   Are you having any side effects from the weight loss medication that we have prescribed you? Yes   If you are having side effects please describe: nausea (w/higher dose) lost of interest, will speak with provider about it more       ASSESSMENT:   Mi Salgado is a 28 year old female who I am continuing to see for treatment of obesity related to: PCOS    Tried phentermine but stopped due to being more anxious and irritable  Switched to Wegovy but appetite was completely suppressed, extreme nausea and fatigue  Does not want to start any more anti-obesity medication " at this time    PLAN:   - continue to work on diet and exercise  - no anti-obesity medications at this time    FOLLOW-UP:    Refer nutritionist.    Patient visit on June 4, 2024           Started at 12:41 PM           Ended at 12:48 PM           Total length of 6m32s      Sincerely,    Trever Myers MD    Virtual Visit Details    Type of service:  Telephone Visit   Originating Location (pt. Location): Home  {PROVIDER LOCATION On-site should be selected for visits conducted from your clinic location or adjoining [facility] hospital, academic office, or other nearby [facility] building. Off-site should be selected for all other provider locations, including home:[id number]}  Distant Location (provider location):  Off-site      Again, thank you for allowing me to participate in the care of your patient.      Sincerely,    Trever Myers MD

## 2024-06-04 NOTE — NURSING NOTE
Is the patient currently in the state of [place]? YES    Visit mode:TELEPHONE    If the visit is dropped, the patient can be reconnected by: TELEPHONE VISIT: Phone number:   Telephone Information:   Mobile [phone]       Will anyone else be joining the visit? NO  (If patient encounters technical issues they should call [phone]    How would you like to obtain your AVS? MyChart    Are changes needed to the allergy or medication list? No    Are refills needed on medications prescribed by this physician? NO    Reason for visit: RECHHARRISON MARS

## 2024-09-05 ENCOUNTER — VIRTUAL VISIT (OUTPATIENT)
Dept: FAMILY MEDICINE | Facility: CLINIC | Age: 29
End: 2024-09-05
Payer: COMMERCIAL

## 2024-09-05 DIAGNOSIS — J20.9 ACUTE BRONCHITIS, UNSPECIFIED ORGANISM: Primary | ICD-10-CM

## 2024-09-05 PROCEDURE — 99213 OFFICE O/P EST LOW 20 MIN: CPT | Mod: 95 | Performed by: STUDENT IN AN ORGANIZED HEALTH CARE EDUCATION/TRAINING PROGRAM

## 2024-09-05 RX ORDER — AZITHROMYCIN 250 MG/1
TABLET, FILM COATED ORAL
Qty: 6 TABLET | Refills: 0 | Status: SHIPPED | OUTPATIENT
Start: 2024-09-05 | End: 2024-09-10

## 2024-09-05 NOTE — PROGRESS NOTES
Mi is a 29 year old who is being evaluated via a billable video visit.    How would you like to obtain your AVS? MyChart  If the video visit is dropped, the invitation should be resent by: Text to cell phone: [phone]  Will anyone else be joining your video visit? No      Assessment & Plan     Acute bronchitis, unspecified organism  Patient completed two covid tests after visit and both were negative. Given her symptoms we will treat with z-pack for bronchitis. If not improving in 48 hours add augmentin to cover for pneumonia or be seen in person for evaluation   - azithromycin (ZITHROMAX) 250 MG tablet; Take 2 tablets (500 mg) by mouth daily for 1 day, THEN 1 tablet (250 mg) daily for 4 days.          Subjective   Mi is a 29 year old, presenting for the following health issues:  Illness      Video Start Time: 1:07 PM    HPI     Acute Illness  Onset/Duration: 1 weeks  Symptoms:  Fever: YES fever broke yesterday. Temp today 98.9  Chills/Sweats: YES both  Headache (location?): yes  Sinus Pressure: No  Conjunctivitis:  No  Ear Pain: no  Rhinorrhea: YES slight   Congestion: YES  Sore Throat: No  Cough: YES   Wheeze: YES   Decreased Appetite: YES  Nausea: YES  Vomiting: No  Diarrhea: YES  Dysuria/Freq.: No  Dysuria or Hematuria: No  Fatigue/Achiness: YES  Chest tightness, trouble breathing  Sick/Strep Exposure: YES kids go to . Did also go to the fair.  Therapies tried and outcome: theraflu for the past 4-5 days and cough drops     No home covid test    Felt it coming on two weeks ago, was feeling very tired especially with exercises. Starting a 5-6 days ago started getting chills, really tired, then this weekend got back/body aches and then moved into her chest, headaches. On Monday temp was 102F. Has been sweating a lot the last few days. Chest is really congested and feels tight in upper chest. Cough is mostly non-productive, wet, some yellow-gray mucous. No ear pain, sinus pressure. Not  Pt called to speak with nurse to go over her MRI results. Please advise. wheezing.     Took theraflu which helped a little bit. Vicks helps.     Kids were sick a few weeks ago.       Review of Systems  Constitutional, HEENT, cardiovascular, pulmonary, gi and gu systems are negative, except as otherwise noted.      Objective           Vitals:  No vitals were obtained today due to virtual visit.    Physical Exam   GENERAL: alert and no distress  EYES: Eyes grossly normal to inspection.  No discharge or erythema, or obvious scleral/conjunctival abnormalities.  RESP: cough. No audible wheeze, or visible cyanosis.    SKIN: Visible skin clear. No significant rash, abnormal pigmentation or lesions.  NEURO: Cranial nerves grossly intact.  Mentation and speech appropriate for age.  PSYCH: Appropriate affect, tone, and pace of words          Video-Visit Details    Type of service:  Video Visit   Video End Time:1:17 PM  Originating Location (pt. Location): Home    Distant Location (provider location):  On-site  Platform used for Video Visit: Yolanda  Signed Electronically by: Marlen Dill DO

## 2024-10-15 ENCOUNTER — MYC MEDICAL ADVICE (OUTPATIENT)
Dept: OBGYN | Facility: CLINIC | Age: 29
End: 2024-10-15
Payer: COMMERCIAL

## 2024-10-15 DIAGNOSIS — B00.9 HERPES SIMPLEX VIRUS INFECTION: Primary | ICD-10-CM

## 2024-10-15 RX ORDER — VALACYCLOVIR HYDROCHLORIDE 500 MG/1
500 TABLET, FILM COATED ORAL 2 TIMES DAILY
Qty: 6 TABLET | Refills: 1 | Status: SHIPPED | OUTPATIENT
Start: 2024-10-15 | End: 2024-10-18

## 2024-10-17 ENCOUNTER — TELEPHONE (OUTPATIENT)
Dept: OBGYN | Facility: CLINIC | Age: 29
End: 2024-10-17
Payer: COMMERCIAL

## 2024-10-17 NOTE — TELEPHONE ENCOUNTER
Pt calling after being transferred multiple times stating the number for OB she keeps dialing and being transferred to states the number is out of service and is requesting a message be sent to her provider.    She states the medication she was on for her HSV outbreak was for only a few days and that she is still having symptoms and last time she was on it for much longer and is requesting a rx for a longer duration of medication.    Please advise,    Thanks!  Demarcus MAURICIO RN   [facility]

## 2024-10-18 ENCOUNTER — VIRTUAL VISIT (OUTPATIENT)
Dept: URGENT CARE | Facility: CLINIC | Age: 29
End: 2024-10-18
Payer: COMMERCIAL

## 2024-10-18 ENCOUNTER — OFFICE VISIT (OUTPATIENT)
Dept: FAMILY MEDICINE | Facility: CLINIC | Age: 29
End: 2024-10-18
Payer: COMMERCIAL

## 2024-10-18 ENCOUNTER — TELEPHONE (OUTPATIENT)
Dept: OBGYN | Facility: CLINIC | Age: 29
End: 2024-10-18

## 2024-10-18 VITALS
DIASTOLIC BLOOD PRESSURE: 77 MMHG | SYSTOLIC BLOOD PRESSURE: 125 MMHG | OXYGEN SATURATION: 97 % | BODY MASS INDEX: 32.3 KG/M2 | RESPIRATION RATE: 18 BRPM | TEMPERATURE: 98.4 F | HEART RATE: 72 BPM | WEIGHT: 200 LBS

## 2024-10-18 DIAGNOSIS — B00.9 HERPES SIMPLEX: Primary | ICD-10-CM

## 2024-10-18 DIAGNOSIS — A60.9: Primary | ICD-10-CM

## 2024-10-18 DIAGNOSIS — B00.9 HSV (HERPES SIMPLEX VIRUS) INFECTION: Primary | ICD-10-CM

## 2024-10-18 PROCEDURE — 99207 PR NON-BILLABLE SERV PER CHARTING: CPT | Performed by: EMERGENCY MEDICINE

## 2024-10-18 PROCEDURE — 99214 OFFICE O/P EST MOD 30 MIN: CPT | Performed by: NURSE PRACTITIONER

## 2024-10-18 RX ORDER — OXYCODONE AND ACETAMINOPHEN 5; 325 MG/1; MG/1
1 TABLET ORAL EVERY 6 HOURS PRN
Qty: 8 TABLET | Refills: 0 | Status: SHIPPED | OUTPATIENT
Start: 2024-10-18 | End: 2024-10-21

## 2024-10-18 RX ORDER — ACYCLOVIR 50 MG/G
CREAM TOPICAL
Qty: 5 G | Refills: 3 | Status: SHIPPED | OUTPATIENT
Start: 2024-10-18

## 2024-10-18 NOTE — TELEPHONE ENCOUNTER
Per TE:  Pt calling after being transferred multiple times stating the number for OB she keeps dialing and being transferred to states the number is out of service and is requesting a message be sent to her provider.     She states the medication she was on for her HSV outbreak was for only a few days and that she is still having symptoms and last time she was on it for much longer and is requesting a rx for a longer duration of medication.     Was unable to reach pt over the phone so responded to MyC message.

## 2024-10-18 NOTE — TELEPHONE ENCOUNTER
Prior Authorization Retail Medication Request    Medication/Dose: Acyclovir 5% cream  Diagnosis and ICD code (if different than what is on RX):    New/renewal/insurance change PA/secondary ins. PA:  Previously Tried and Failed:    Rationale:      Insurance   Primary:   Insurance ID:      Secondary (if applicable):  Insurance ID:      Pharmacy Information (if different than what is on RX)  Name:    Phone:    Fax:    Clinic Information  Preferred routing pool for dept communication:

## 2024-10-18 NOTE — CONFIDENTIAL NOTE
Note: Unable to reach patient after multiple attempts and have left several messages.  Advised her to reschedule or consider urgent care or her PCP as alternate care.  PINA Davis MD

## 2024-10-18 NOTE — TELEPHONE ENCOUNTER
Spoke to pt. Informed her that June Rivera CNM sent in an RX for acyclovir cream to apply 5 x daily. Educated pt she can use that with oral and refill the oral as needed as well. If no improvement in a few days, she should be seen. It could be an ineffective gland or cyst.     Pt is driving to urgent care right now.    Pt scheduled Monday 10/21 with JOSIAH.     Scheduled 11/1 with Steph per pt request.

## 2024-10-18 NOTE — PATIENT INSTRUCTIONS
Recommend giving this a little more time.     Schedule ibuprofen 600 mg 4 times daily with something to eat.  Oxycodone only if the pain is not relieved.  Avoid alcohol and driving for 6 to 8 hours with this.  Hopefully will start to slowly decrease over the weekend.    Recommend wearing cotton underwear and loose pants.    See how you are doing in the next 3 to 4 days.  See your OB/GYN on Monday no matter what as planned.    If you change your mind and want us to check the actual bumps for herpes, you can always come back over the weekend.

## 2024-10-18 NOTE — PROGRESS NOTES
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  Has similar symptoms to previous outbreak on the left buttock starting 5 days ago.  Started acyclovir 3-day course the day after via phone call to OB/GYN.  Back today because pain is still present at 9 out of 10.  Much better with ibuprofen.  Did show the patient pictures of HSV on the skin and patient did agree that early on, the affected area looked more blistery.  Does appear to be resolving somewhat.  Area is quite tender and about the size of a quarter with a few additional lesions outside of the main area on the left buttock.  Patient has also been trying to go to the gym, wearing tight pants.    Cotton underwear, avoidance of tight pants, giving this a few more days.  After discussion, will give patient a short course of opiates to take with avoidance of alcohol and driving.  She does have a scheduled follow-up for same look like a drainable abscess and does not look similar to bacterial infection.  Is not very itchy, but was itchy right at the onset, so do not suspect, for instance, contact dermatitis.    Has OB/GYN appointment scheduled on Monday for the same issue.    Did offer PCR test of the actual lesion to ensure that this was HSV.  Patient declined for now.  Explained she can come back at any time if she changes her mind.  25 minutes spent by me on the date of the encounter doing chart review, patient visit, documentation, and review of research, discussion with colleague on management          Return in about 3 days (around 10/21/2024).    Dina Zafar [facility] [place]    Shantell Stark is a 29 year old female who presents to clinic today for the following health issues:  Chief Complaint   Patient presents with    Lesion     Since Monday bump on left side off butte.       HPI    Has h/o HSV  with outbreak likely on the buttock in the past on the opposite side.    Started noticing bumps on the left buttock 4 days ago.      Started using valacyclovir tablets via phone call to PCP the next day, just took last dose.      Also given acylovir cream today - hasn't started yet.      Pain 9/10.      Started in a smaller area, now is feeling like it hurts worse.      Looked like blisters intially.            Review of Systems    Denies fever or chills.  Denies history of any genital herpes outbreaks.        Objective    /77   Pulse 72   Temp 98.4  F (36.9  C)   Resp 18   Wt 90.7 kg (200 lb)   LMP 10/17/2024 (Exact Date)   SpO2 97%   BMI 32.30 kg/m    Physical Exam  Pulmonary:      Effort: Pulmonary effort is normal.   Skin:     Comments: Patch of resolving blisters/papules located mid left buttock approximately 3 cm in diameter.  A couple additional papular areas outside of this that are also tender.  There is also skin irritation from large Band-Aid over this area.  No draining.     Small pustule within the same area as well.   Neurological:      Mental Status: She is alert and oriented to person, place, and time.   Psychiatric:         Mood and Affect: Mood normal.

## 2024-10-21 NOTE — TELEPHONE ENCOUNTER
Central Prior Authorization Team  Phone: [phone]    PRIOR AUTHORIZATION DENIED    Medication: ACYCLOVIR 5 % EX CREA  Insurance Company: [employer] - Phone [phone] Fax [phone]  Denial Date: 10/18/2024  Denial Reason(s):         Appeal Information:         Patient Notified: NO- Unfortunately, we cannot call the patient with denials because we do not know what next steps the MD will take nor can we give medical advice, please notify the patient of what they are to expect for the continuation of their therapy from the provider.

## 2024-10-21 NOTE — TELEPHONE ENCOUNTER
Central Prior Authorization Team  Phone: [phone]    Insurance wants to know if patient has tried preferred medications- I do not see them on her med list.     Would these be less effective for the patient? Routing to clinic

## 2024-10-22 DIAGNOSIS — B00.9 HERPES SIMPLEX VIRUS INFECTION: Primary | ICD-10-CM

## 2024-10-22 RX ORDER — ACYCLOVIR 50 MG/G
OINTMENT TOPICAL
Qty: 15 G | Refills: 3 | Status: SHIPPED | OUTPATIENT
Start: 2024-10-22

## 2024-10-22 NOTE — TELEPHONE ENCOUNTER
Hey there,    Do we know why she wants a topical HSV cream in addition to the HSV oral meds? I haven't heard of this before. Are her symptoms improving? Thanks!    Ness

## 2024-11-14 ENCOUNTER — TELEPHONE (OUTPATIENT)
Dept: OBGYN | Facility: CLINIC | Age: 29
End: 2024-11-14
Payer: COMMERCIAL

## 2024-11-14 NOTE — TELEPHONE ENCOUNTER
[facility] Call Center    Phone Message    May a detailed message be left on voicemail: yes     Reason for Call: Other: Patient called asking to be scheduled with Steph Castillo. States she's currently pregnant and she seen her for her last pregnancy and she's high risk. Writer informed patient about scheduling via protocol and that a message can be sent. She states she just wants to talk to Steph so she can refer her to someone who specializes in 's because she's not delivering at [facility]. States [facility] violated her. She wants to speak with Steph only. Let her know that she won't be back in clinic until next week she stated that is fine. Please contact patient in regards to this message. LMP is 10/17/24. Thank you      Action Taken: Other: s    Travel Screening: Not Applicable     Date of Service:

## 2024-11-19 NOTE — TELEPHONE ENCOUNTER
TC: Called Mi 30 yo  at 5/5 weeks. Mi reports she had an LMP 10/17/24, had a positive pregnancy test 24 when she was 3 weeks 5 days. She is feeling anxious about being pregnant again and is very hopeful that she can have a . She did not feel supported or listened to with her last birth with our team and is considering going elsewhere.     Discussed with her that not all practices offer care with midwives if she has had two cesareans. Our group and [facility] offer this with a CNM service.     Discussed that there is an increased risk of uterine rupture if she has had two cesareans - the risk goes up to 2%   -According to UpToDate, the likelihood of  appears to be similar for women with one versus two prior  deliveries (65 to 85 percent); however, women with more than one prior  may have a slightly higher rate of uterine rupture. In a 2010 meta-analysis of five cohort studies of uterine rupture in women with prior  deliveries, women undergoing TOLAC after one prior  were at significantly lower risk of rupture than those with two prior cesareans (0.72 versus 1.59 percent, odds ratio [OR] 0.42, 95% CI 0.29-0.60); however, the definition of rupture varied among studies.    Supported Mi in whatever decision she makes, we can help facilitate a transfer of care if she chooses to seek a different midwife services for this pregnancy.     Mi would like to continue this discussion and will continue with her care with our team for now. Referred Mi to [facility] for childbirth education and  support for a .       BABITA Wilkinson, CNM

## 2024-11-24 ENCOUNTER — MYC MEDICAL ADVICE (OUTPATIENT)
Dept: OBGYN | Facility: CLINIC | Age: 29
End: 2024-11-24
Payer: COMMERCIAL

## 2024-11-24 DIAGNOSIS — O20.9 BLEEDING IN EARLY PREGNANCY: Primary | ICD-10-CM

## 2024-11-25 ENCOUNTER — LAB (OUTPATIENT)
Dept: LAB | Facility: CLINIC | Age: 29
End: 2024-11-25
Payer: COMMERCIAL

## 2024-11-25 DIAGNOSIS — O20.9 BLEEDING IN EARLY PREGNANCY: ICD-10-CM

## 2024-11-25 LAB
HCG INTACT+B SERPL-ACNC: 7519 MIU/ML
TSH SERPL DL<=0.005 MIU/L-ACNC: 1.05 UIU/ML (ref 0.3–4.2)

## 2024-11-25 PROCEDURE — 84702 CHORIONIC GONADOTROPIN TEST: CPT

## 2024-11-25 PROCEDURE — 36415 COLL VENOUS BLD VENIPUNCTURE: CPT

## 2024-11-27 ENCOUNTER — LAB (OUTPATIENT)
Dept: LAB | Facility: CLINIC | Age: 29
End: 2024-11-27
Payer: COMMERCIAL

## 2024-11-27 DIAGNOSIS — O20.9 BLEEDING IN EARLY PREGNANCY: ICD-10-CM

## 2024-11-27 LAB — HCG INTACT+B SERPL-ACNC: ABNORMAL MIU/ML

## 2024-11-27 PROCEDURE — 84702 CHORIONIC GONADOTROPIN TEST: CPT

## 2024-11-27 PROCEDURE — 36415 COLL VENOUS BLD VENIPUNCTURE: CPT

## 2024-11-27 NOTE — LETTER
December 27, 2023        Mi Salgado  [address] [place] [id number]    To Whom it May Concern:    Mi Salgado was seen in our office for prenatal care. She is cleared to return to work beginning January 3rd, 2024. She does not have any restrictions at this time.       Sincerely,        BABITA Del Castillo, SANCHEZM          
Offered, feeding was successful

## 2024-12-03 ENCOUNTER — VIRTUAL VISIT (OUTPATIENT)
Dept: OBGYN | Facility: CLINIC | Age: 29
End: 2024-12-03
Attending: OBSTETRICS & GYNECOLOGY
Payer: COMMERCIAL

## 2024-12-03 DIAGNOSIS — O09.91 SUPERVISION OF HIGH RISK PREGNANCY IN FIRST TRIMESTER: Primary | ICD-10-CM

## 2024-12-03 RX ORDER — CHLORAL HYDRATE 500 MG
CAPSULE ORAL
COMMUNITY
Start: 2024-07-14

## 2024-12-03 RX ORDER — CHOLECALCIFEROL (VITAMIN D3) 50 MCG
TABLET ORAL
COMMUNITY
Start: 2024-02-13

## 2024-12-03 NOTE — PATIENT INSTRUCTIONS
Thank you for trusting us with your care!   Please be aware, if you are on Mychart, you may see your results prior to your providers review. If labs are abnormal, we will call or message you on Mychart with a follow up plan.    If you need to contact us for questions about:  Symptoms, Scheduling & Medical Questions; Non-urgent (2-3 day response) Mychart message, Urgent (needing response today) [phone] (if after 3:30pm next day response)   Prescriptions: Please call your Pharmacy   Billing: [facility] [phone] or [facility]:[phone]      Learning About Pregnancy  Your Care Instructions     Your health in the early weeks of your pregnancy is particularly important for your baby's health. Take good care of yourself. Anything you do that harms your body can also harm your baby.  Make sure to go to all of your doctor appointments. Regular checkups will help keep you and your baby healthy.  How can you care for yourself at home?  Diet    Choose healthy foods like fruits, vegetables, whole grains, lean proteins, and healthy fats.     Choose foods that are good sources of calcium, iron, and folate. You can try dairy products, dark leafy greens, fortified orange juice and cereals, almonds, broccoli, dried fruit, and beans.     Do not skip meals or go for many hours without eating. If you are nauseated, try to eat a small, healthy snack every 2 to 3 hours.     Avoid fish that are high in mercury. These include shark, swordfish, viola mackerel, marlin, orange roughy, and bigeye tuna, as well as tilefish from the [facility].     It's okay to eat up to 8 to 12 ounces a week of fish that are low in mercury or up to 4 ounces a week of fish that have medium levels of mercury. Some fish that are low in mercury are salmon, shrimp, canned light tuna, cod, and tilapia. Some fish that have medium levels of mercury are halibut and white albacore tuna.     Drink plenty of fluids. If you have kidney, heart, or liver disease  and have to limit fluids, talk with your doctor before you increase the amount of fluids you drink.     Limit caffeine to about 200 to 300 mg per day. On average, a cup of brewed coffee has around 80 to 100 mg of caffeine.     Do not drink alcohol, such as beer, wine, or hard liquor.     Take a multivitamin that contains at least 400 micrograms (mcg) of folic acid to help prevent birth defects. Fortified cereal and whole wheat bread are good additional sources of folic acid.     Increase the calcium in your diet. Try to drink a quart of skim milk each day. You may also take calcium supplements and choose foods such as cheese and yogurt.   Lifestyle    Make sure you go to your follow-up appointments.     Get plenty of rest. You may be unusually tired while you are pregnant.     Get at least 30 minutes of exercise on most days of the week. Walking is a good choice. If you have not exercised in the past, start out slowly. Take several short walks each day.     Do not smoke. If you need help quitting, talk to your doctor about stop-smoking programs. These can increase your chances of quitting for good.     Do not touch cat feces or litter boxes. Also, wash your hands after you handle raw meat, and fully cook all meat before you eat it. Wear gloves when you work in the yard or garden, and wash your hands well when you are done. Cat feces, raw or undercooked meat, and contaminated dirt can cause an infection that may harm your baby or lead to a miscarriage.     Avoid things that can make your body too hot and may be harmful to your baby, such as a hot tub or sauna. Or talk with your doctor before doing anything that raises your body temperature. Your doctor can tell you if it's safe.     Avoid chemical fumes, paint fumes, or poisons.     Do not use illegal drugs, marijuana, or alcohol.   Medicines    Review all of your medicines with your doctor. Some of your routine medicines may need to be changed to protect your baby.      Use acetaminophen (Tylenol) to relieve minor problems, such as a mild headache or backache or a mild fever with cold symptoms. Do not use nonsteroidal anti-inflammatory drugs (NSAIDs), such as ibuprofen (Advil, Motrin) or naproxen (Aleve), unless your doctor says it is okay.     Do not take two or more pain medicines at the same time unless the doctor told you to. Many pain medicines have acetaminophen, which is Tylenol. Too much acetaminophen (Tylenol) can be harmful.     Take your medicines exactly as prescribed. Call your doctor if you think you are having a problem with your medicine.   To manage morning sickness    Keep food in your stomach, but not too much at once. Try eating five or six small meals a day instead of three large meals.     For nausea when you wake up, eat a small snack, such as a couple of crackers or pretzels, before rising. Allow a few minutes for your stomach to settle before you slowly get up.     Try to avoid smells and foods that make you feel nauseated. High-fat or greasy foods, milk, and coffee may make nausea worse. Some foods that may be easier to tolerate include cold, spicy, sour, and salty foods.     Drink enough fluids. Water and other caffeine-free drinks are good choices.     Take your prenatal vitamins at night on a full stomach.     Try foods and drinks made with nusrat. Nusrat may help with nausea.     Get lots of rest. Morning sickness may be worse when you are tired.     Talk to your doctor about over-the-counter products, such as vitamin B6 or doxylamine, to help relieve symptoms.     Try a P6 acupressure wrist band. These anti-nausea wristbands help some people.   Follow-up care is a key part of your treatment and safety. Be sure to make and go to all appointments, and call your doctor if you are having problems. It's also a good idea to know your test results and keep a list of the medicines you take.  Where can you learn more?  Go to  "https://www.[email or web address]/patiented  Enter E868 in the search box to learn more about \"Learning About Pregnancy.\"  Current as of: July 10, 2023  Content Version: 14.2 2024 [facility] [employer].   Care instructions adapted under license by your healthcare professional. If you have questions about a medical condition or this instruction, always ask your healthcare professional. Healthwise, Incorporated disclaims any warranty or liability for your use of this information.    Weeks 6 to 10 of Your Pregnancy: Care Instructions  During these weeks of pregnancy, your body goes through many changes. You may start to feel different, both in your body and your emotions. Each pregnancy is different, so there's no \"right\" way to feel. These early weeks are a time to make healthy choices for you and your pregnancy.    Take a daily prenatal vitamin. Choose one with folic acid in it.   Avoid alcohol, tobacco, and drugs (including marijuana). If you need help quitting, talk to your doctor.     Drink plenty of liquids.  Be sure to drink enough water. And limit sodas, other sweetened drinks, and caffeine.     Choose foods that are good sources of calcium, iron, and folate.  You can try dairy products, dark leafy greens, fortified orange juice and cereals, almonds, broccoli, dried fruit, and beans.     Avoid foods that may be harmful.  Don't eat raw meat, deli meat, raw seafood, or raw eggs. Avoid soft cheese and unpasteurized dairy, like Brie and blue cheese. And don't eat fish that contains a lot of mercury, like shark and swordfish.     Don't touch suni litter or cat poop.  They can cause an infection that could be harmful during pregnancy.     Avoid things that can make your body too hot.  For example, avoid hot tubs and saunas.     Soothe morning sickness.  Try eating 5 or 6 small meals a day, getting some fresh air, or using emperatriz to control symptoms.     Ask your doctor about flu and COVID-19 shots.  Getting them can " "help protect against infection.   Follow-up care is a key part of your treatment and safety. Be sure to make and go to all appointments, and call your doctor if you are having problems. It's also a good idea to know your test results and keep a list of the medicines you take.  Where can you learn more?  Go to https://www.U[employer]ie.Youtopia/patiented  Enter G112 in the search box to learn more about \"Weeks 6 to 10 of Your Pregnancy: Care Instructions.\"  Current as of: July 10, 2023  Content Version: 14.2 2024 [employer].   Care instructions adapted under license by your healthcare professional. If you have questions about a medical condition or this instruction, always ask your healthcare professional. [employer] disclaims any warranty or liability for your use of this information.       Pregnancy: Managing Morning Sickness (01:48)  Your health professional recommends that you watch this short online health video.  Learn how to manage morning sickness during pregnancy.   Purpose: Learn how to manage morning sickness during pregnancy.  Goal: Learn how to manage morning sickness during pregnancy.    Watch: Scan the QR code or visit the link to view video       [email or web address].se/r/[email or web address]  Current as of: July 10, 2023  Content Version: 14.2 2024 [employer].   Care instructions adapted under license by your healthcare professional. If you have questions about a medical condition or this instruction, always ask your healthcare professional. [employer] disclaims any warranty or liability for your use of this information.    Pregnancy and Heartburn: Care Instructions  Overview     Heartburn is a common problem during pregnancy.  Heartburn happens when stomach acid backs up into the tube that carries food to the stomach. This tube is called the esophagus. Early in pregnancy, heartburn is caused by hormone changes that slow down digestion. Later on, it's also caused by " "the large uterus pushing up on the stomach.  Even though you can't fix the cause, there are things you can do to get relief. Treating heartburn during pregnancy focuses first on making lifestyle changes, like changing what and how you eat, and on taking medicines.  Heartburn usually improves or goes away after childbirth.  Follow-up care is a key part of your treatment and safety. Be sure to make and go to all appointments, and call your doctor if you are having problems. It's also a good idea to know your test results and keep a list of the medicines you take.  How can you care for yourself at home?  Eat small, frequent meals.  Avoid foods that make your symptoms worse, such as chocolate, peppermint, and spicy foods. Avoid drinks with caffeine, such as coffee, tea, and sodas.  Avoid bending over or lying down after meals.  Take a short walk after you eat.  If heartburn is a problem at night, do not eat for 2 hours before bedtime.  Take antacids like Mylanta, Maalox, Rolaids, or Tums. Do not take antacids that have sodium bicarbonate, magnesium trisilicate, or aspirin. Be careful when you take over-the-counter antacid medicines. Many of these medicines have aspirin in them. While you are pregnant, do not take aspirin or medicines that contain aspirin unless your doctor says it is okay.  If you're not getting relief, talk to your doctor. You may be able to take a stronger acid-reducing medicine.  When should you call for help?   Call your doctor now or seek immediate medical care if:    You have new or worse belly pain.     You are vomiting.   Watch closely for changes in your health, and be sure to contact your doctor if:    You have new or worse symptoms of reflux.     You are losing weight.     You have trouble or pain swallowing.     You do not get better as expected.   Where can you learn more?  Go to https://www.healthwise.net/patiented  Enter U946 in the search box to learn more about \"Pregnancy and Heartburn: " "Care Instructions.\"  Current as of: July 10, 2023  Content Version: 14.2 2024 [facility] [employer].   Care instructions adapted under license by your healthcare professional. If you have questions about a medical condition or this instruction, always ask your healthcare professional. Healthwise, Incorporated disclaims any warranty or liability for your use of this information.    Constipation: Care Instructions  Overview     Constipation means that you have a hard time passing stools (bowel movements). People pass stools from 3 times a day to once every 3 days. What is normal for you may be different. Constipation may occur with pain in the rectum and cramping. The pain may get worse when you try to pass stools. Sometimes there are small amounts of bright red blood on toilet paper or the surface of stools. This is because of enlarged veins near the rectum (hemorrhoids).  A few changes in your diet and lifestyle may help you avoid ongoing constipation. Your doctor may also prescribe medicine to help loosen your stool.  Some medicines can cause constipation. These include pain medicines and antidepressants. Tell your doctor about all the medicines you take. Your doctor may want to make a medicine change to ease your symptoms.  Follow-up care is a key part of your treatment and safety. Be sure to make and go to all appointments, and call your doctor if you are having problems. It's also a good idea to know your test results and keep a list of the medicines you take.  How can you care for yourself at home?  Drink plenty of fluids. If you have kidney, heart, or liver disease and have to limit fluids, talk with your doctor before you increase the amount of fluids you drink.  Include high-fiber foods in your diet each day. These include fruits, vegetables, beans, and whole grains.  Get at least 30 minutes of exercise on most days of the week. Walking is a good choice. You also may want to do other activities, such as " "running, swimming, cycling, or playing tennis or team sports.  Take a fiber supplement, such as Citrucel or Metamucil, every day. Read and follow all instructions on the label.  Schedule time each day for a bowel movement. A daily routine may help. Take your time having a bowel movement, but don't sit for more than 10 minutes at a time. And don't strain too much.  Support your feet with a small step stool when you sit on the toilet. This helps flex your hips and places your pelvis in a squatting position.  Your doctor may recommend an over-the-counter laxative to relieve your constipation. Examples are Milk of Magnesia and MiraLax. Read and follow all instructions on the label. Do not use laxatives on a long-term basis.  When should you call for help?   Call your doctor now or seek immediate medical care if:    You have new or worse belly pain.     You have new or worse nausea or vomiting.     You have blood in your stools.   Watch closely for changes in your health, and be sure to contact your doctor if:    Your constipation is getting worse.     You do not get better as expected.   Where can you learn more?  Go to https://www.ModiFace.net/patiented  Enter P343 in the search box to learn more about \"Constipation: Care Instructions.\"  Current as of: October 19, 2023  Content Version: 14.2 2024 [facility] [address].   Care instructions adapted under license by your healthcare professional. If you have questions about a medical condition or this instruction, always ask your healthcare professional. Healthwise, Incorporated disclaims any warranty or liability for your use of this information.    Learning About High-Iron Foods  What foods are high in iron?     The foods you eat contain nutrients, such as vitamins and minerals. Iron is a nutrient. Your body needs the right amount to stay healthy and work as it should. You can use the list below to help you make choices about which foods to eat.  Here are some " "foods that contain iron. They have 1 to 2 milligrams of iron per serving.  Fruits  Figs (dried), 5 figs  Vegetables  Asparagus (canned), 6 pastrana  Tulio, beet, Swiss chard, or turnip greens, 1 cup  Dried peas, cooked,   cup  Seaweed, spirulina (dried),   cup  Spinach, (cooked)   cup or (raw) 1 cup  Grains  Cereals, fortified with iron, 1 cup  Grits (instant, cooked), fortified with iron,   cup  Meats and other protein foods  Beans (kidney, lima, navy, white), canned or cooked,   cup  Beef or lamb, 3 oz  Chicken giblets, 3 oz  Chickpeas (garbanzo beans),   cup  Liver of beef, lamb, or pork, 3 oz  Oysters (cooked), 3 oz  Sardines (canned), 3 oz  Soybeans (boiled),   cup  Tofu (firm),   cup  Work with your doctor to find out how much of this nutrient you need. Depending on your health, you may need more or less of it in your diet.  Where can you learn more?  Go to https://www.[email or web address].net/patiented  Enter R005 in the search box to learn more about \"Learning About High-Iron Foods.\"  Current as of: September 20, 2023  Content Version: 14.2 2024 [facility] [employer].   Care instructions adapted under license by your healthcare professional. If you have questions about a medical condition or this instruction, always ask your healthcare professional. Healthwise, Incorporated disclaims any warranty or liability for your use of this information.    Rh Antibodies Screening During Pregnancy: About This Test  What is it?     The Rh antibodies screening test is a blood test. It checks your blood for Rh antibodies. If you have Rh-negative blood and have been exposed to Rh-positive blood, your immune system may make antibodies to attack the Rh-positive blood. When a pregnant woman has these antibodies, it is called Rh sensitization.  Why is this test done?  The Rh antibodies screening test is done during pregnancy to find out if your baby is at risk for Rh disease. This can happen if you have Rh-negative blood and your " "baby has Rh-positive blood. If your Rh-negative blood mixes with Rh-positive blood, your immune system will make antibodies to attack the Rh-positive blood.  During pregnancy, these antibodies could attach to the baby's red blood cells. This can cause your baby to have serious health problems. The results of this test will help your doctor know how to best care for you and your baby during your pregnancy.  How do you prepare for the test?  In general, there's nothing you have to do before this test, unless your doctor tells you to.  How is the test done?  A health professional uses a needle to take a blood sample, usually from the arm.  What happens after the test?  You will probably be able to go home right away. It depends on the reason for the test.  You can go back to your usual activities right away.  Follow-up care is a key part of your treatment and safety. Be sure to make and go to all appointments, and call your doctor if you are having problems. It's also a good idea to keep a list of the medicines you take. Ask your doctor when you can expect to have your test results.  Where can you learn more?  Go to https://www.[employer].net/patiented  Enter P722 in the search box to learn more about \"Rh Antibodies Screening During Pregnancy: About This Test.\"  Current as of: July 10, 2023  Content Version: 14.2 2024 [employer].   Care instructions adapted under license by your healthcare professional. If you have questions about a medical condition or this instruction, always ask your healthcare professional. Healthwise, Incorporated disclaims any warranty or liability for your use of this information.    Learning About Preventing Rh Disease  What is Rh disease?     Rh disease can be a serious problem in pregnancy. It happens when substances called antibodies in the mother's blood cause red blood cells in her baby's blood to be destroyed. This can occur when the blood types of a mother and her baby do " not match.  All blood has an Rh factor. This is what makes a blood type positive or negative. When you are Rh-negative, your baby may be Rh-negative or Rh-positive. If your baby has Rh-positive blood and it mixes with yours, your body will make antibodies. This is called Rh sensitization.  Most of the time, this is not a problem in a first pregnancy. But in future pregnancies, it could cause Rh disease.  A  with Rh disease has mild anemia and may have jaundice. In severe cases, anemia, jaundice, and swelling can be very dangerous or fatal. Some babies need to be delivered early. Some need special care in the NICU. A very sick baby will need a blood transfusion before or after birth.  Fortunately, Rh sensitization is usually easy to prevent.  That's why it's important to get your Rh status checked in your first trimester. It doesn't cause any warning signs. A blood test is the only way to know if you are Rh-sensitive or are at risk for it.  How can you prevent Rh disease?  If you are Rh-negative, your doctor gives you an Rh immune globulin shot (such as RhoGAM). It helps prevent your body from making the antibodies that attack your baby's red blood cells.  Timing is important. You need the shot at certain times during your pregnancy. And you need one anytime there is a chance that your baby's blood might mix with yours. That can happen with certain prenatal tests or when you have pregnancy bleeding, such as:  Right after any pregnancy loss, amniocentesis, or CVS testing.  After turning of a breech baby.  Before and maybe after childbirth. Your doctor gives you a shot around week 28. If your  is Rh-positive, you will have another shot.  Follow-up care is a key part of your treatment and safety. Be sure to make and go to all appointments, and call your doctor if you are having problems. It's also a good idea to know your test results and keep a list of the medicines you take.  Where can you learn  "more?  Go to https://www.[employer]Prime.net/patiented  Enter W177 in the search box to learn more about \"Learning About Preventing Rh Disease.\"  Current as of: July 10, 2023  Content Version: 14.2 2024 [employer].   Care instructions adapted under license by your healthcare professional. If you have questions about a medical condition or this instruction, always ask your healthcare professional. Healthwise, Incorporated disclaims any warranty or liability for your use of this information.    Learning About Rh Immunoglobulin Shots  Introduction     An Rh immunoglobulin shot is given to pregnant women who have Rh-negative blood.  You may have Rh-negative blood, and your baby may have Rh-positive blood. If the two types of blood mix, your body will make antibodies. This is called Rh sensitization. Most of the time, this is not a problem the first time you're pregnant. But it could cause problems in future pregnancies.  This shot keeps your body from making the antibodies. You get the shot around 28 weeks of pregnancy. After the birth, your baby's blood is tested. If the blood is Rh positive, you will get another shot. You may also get the shot if you have vaginal bleeding while you are pregnant or if you have a miscarriage. These shots protect future pregnancies.  Women with Rh negative blood will need this shot each time they get pregnant.  Example  Rh immunoglobulin (HypRho-D, MICRhoGAM, and RhoGAM)  Possible side effects  Rare side effects may include:  Some mild pain where you got the shot.  A slight fever.  An allergic reaction.  You may have other side effects not listed here. Check the information that comes with your medicine.  What to know about taking this medicine  You may need more than one shot. You may need the shot again:  After amniocentesis, fetal blood sampling, or chorionic villus sampling tests.  If you have bleeding in your second or third trimester.  After turning of a breech " "baby.  After an injury to the belly while you are pregnant.  After a miscarriage or an .  Before or right after treatment for an ectopic or a partial molar pregnancy.  Tell your doctor if you have any allergies or have had a bad response to medicines in the past.  If you get this shot within 3 months of getting a live-virus vaccine, the vaccine may not work. Your doctor will tell you if you need more vaccine.  Check with your doctor or pharmacist before you use any other medicines. This includes over-the-counter medicines. Make sure your doctor knows all of the medicines, vitamins, herbs, and supplements you take. Taking some medicines at the same time can cause problems.  Where can you learn more?  Go to https://www.[email or web address]/patiented  Enter V615 in the search box to learn more about \"Learning About Rh Immunoglobulin Shots.\"  Current as of: July 10, 2023  Content Version: 142024 [employer].   Care instructions adapted under license by your healthcare professional. If you have questions about a medical condition or this instruction, always ask your healthcare professional. Healthwise, Incorporated disclaims any warranty or liability for your use of this information.    Rubella (Cymraes Measles): Care Instructions  Overview  Rubella, also called Cymraes measles or 3-day measles, is a disease caused by a virus. It spreads by coughs, sneezes, and close contact. Rubella usually is mild and does not cause long-term problems. But if you are pregnant and get it, you can give the disease to your unborn baby. This can cause serious birth defects.  While you have rubella, you may get a rash and a mild fever, and the lymph glands in your neck may swell. Older children often have a fever, eye pain, a sore throat, and body aches. You can relieve most symptoms with care at home. Avoid being around others, especially pregnant people, until your rash has been gone for at least 4 days. People who have " "not had this disease before or have not had the vaccine have the greatest chance of getting the virus.  Follow-up care is a key part of your treatment and safety. Be sure to make and go to all appointments, and call your doctor if you are having problems. It's also a good idea to know your test results and keep a list of the medicines you take.  How can you care for yourself at home?  Drink plenty of fluids. If you have kidney, heart, or liver disease and have to limit fluids, talk with your doctor before you increase the amount of fluids you drink.  Get plenty of rest to help your body heal.  Take an over-the-counter pain medicine, such as acetaminophen (Tylenol), ibuprofen (Advil, Motrin), or naproxen (Aleve), to reduce fever and discomfort. Read and follow all instructions on the label. Do not give aspirin to anyone younger than 20. It has been linked to Reye syndrome, a serious illness.  Do not take two or more pain medicines at the same time unless the doctor told you to. Many pain medicines have acetaminophen, which is Tylenol. Too much acetaminophen (Tylenol) can be harmful.  Try not to scratch the rash. Put cold, wet cloths on the rash to reduce itching.  Do not smoke. Smoking can make your symptoms worse. If you need help quitting, talk to your doctor about stop-smoking programs and medicines. These can increase your chances of quitting for good.  Avoid contact with people who have never had rubella and who have not been immunized.  When should you call for help?   Call your doctor now or seek immediate medical care if:    You have a fever with a stiff neck or a severe headache.     You are sensitive to light or feel very sleepy or confused.   Watch closely for changes in your health, and be sure to contact your doctor if:    You do not get better as expected.   Where can you learn more?  Go to https://www.healthwise.net/patiented  Enter B812 in the search box to learn more about \"Rubella (Romansh Measles): " "Care Instructions.\"  Current as of: 2023  Content Version: 142024 [facility].   Care instructions adapted under license by your healthcare professional. If you have questions about a medical condition or this instruction, always ask your healthcare professional. Healthwise, Incorporated disclaims any warranty or liability for your use of this information.    Gonorrhea and Chlamydia: About These Tests  What is it?  These tests use a sample of urine or other body fluid to look for the bacteria that cause these sexually transmitted infections (STIs). The fluid sample can come from the cervix, vagina, rectum, throat, or eyes.  Why is this test done?  These tests may be done to:  Find out if symptoms are caused by gonorrhea or chlamydia.  Check people who are at high risk of being infected with gonorrhea or chlamydia.  Retest people several months after they have been treated for gonorrhea or chlamydia.  Check for infection in your  if you had a gonorrhea or chlamydia infection at the time of delivery.  How can you prepare for the test?  If you are going to have a urine test, do not urinate for at least 1 hour before the test.  If you think you may have chlamydia or gonorrhea, don't have sexual intercourse until you get your test results. And you may want to have tests for other STIs, such as HIV.  How is the test done?  For a direct sample, a swab is used to collect body fluid from the cervix, vagina, rectum, throat, or eyes. Your doctor may collect the sample. Or you may be given instructions on how to collect your own sample.  For a urine sample, you will collect the urine that comes out when you first start to urinate. Don't wipe the genital area clean before you urinate.  How long does the test take?  The test will take a few minutes.  What happens after the test?  You will be able to go home right away.  You can go back to your usual activities right away.  If you do have an " "infection, don't have sexual intercourse for 7 days after you start treatment. And your sex partner(s) should also be treated.  Follow-up care is a key part of your treatment and safety. Be sure to make and go to all appointments, and call your doctor if you are having problems. It's also a good idea to keep a list of the medicines you take. Ask your doctor when you can expect to have your test results.  Where can you learn more?  Go to [email or web address]/patiented  Enter K976 in the search box to learn more about \"Gonorrhea and Chlamydia: About These Tests.\"  Current as of: November 27, 2023  Content Version: 14.2 2024 [facility] [employer] [employer].   Care instructions adapted under license by your healthcare professional. If you have questions about a medical condition or this instruction, always ask your healthcare professional. Healthwise, Incorporated disclaims any warranty or liability for your use of this information.    Trichomoniasis: About This Test  What is it?     This test uses a sample of urine or other body fluid to look for the tiny parasite that causes trichomoniasis (also called trich). The fluid sample can come from the vagina, cervix, or urethra. Your doctor may choose to use one or more of many available tests.  Why is it done?  A trich test may be done to:  Find out if symptoms are caused by trich.  Check people who are at high risk for being infected with trich.  Check after treatment to make sure that the infection is gone.  How do you prepare for the test?  If you are going to have a urine test, do not urinate for at least 1 hour before the test.  How is the test done?  For a direct sample, a swab is used to collect body fluid from the cervix, vagina, or urethra. Your doctor may collect the sample. Or you may be given instructions on how to collect your own sample.  For a urine sample, you will collect the urine that comes out when you first start to urinate. Don't wipe the area clean " before you urinate.  How long does the test take?  It will take a few minutes to collect a sample.  What happens after the test?  You can go home right away.  You can go back to your usual activities right away.  You may get the test results the same day or several days later. It depends on the test used.  If you do have an infection, don't have sexual intercourse for 7 days after you start treatment. Your sex partner or partners should also be treated.  Follow-up care is a key part of your treatment and safety. Be sure to make and go to all appointments, and call your doctor if you are having problems. Ask your doctor when you can expect to have your test results.  Current as of: November 27, 2023  Content Version: 14.2    2024 [employer].   Care instructions adapted under license by your healthcare professional. If you have questions about a medical condition or this instruction, always ask your healthcare professional. [employer] disclaims any warranty or liability for your use of this information.    HIV Testing: Care Instructions  Overview  You can get tested for the human immunodeficiency virus (HIV). Most doctors use a blood test to check for HIV antibodies and antigens in your blood. It may also check for the genetic material (RNA) of HIV. Some tests use saliva to check for HIV antibodies. But these aren't as accurate. For example, they may give a false result if you've just been infected.  What do the results mean?        Normal (negative)   No HIV antibodies, antigens, or RNA were found.  You may need more testing. It can make sure your test results are correct.        Uncertain (indeterminate)   Test results didn't clearly show if you have an HIV infection.  HIV antibodies or antigens may not have formed yet.  Some other type of antibody or antigen may have affected the results.  You will need another test to be sure.        Abnormal (positive)   HIV antibodies, antigens, or RNA  "were found.  If you haven't had an RNA test yet, one will be done. If it's positive, you have HIV.  If your test result is positive, your doctor will talk to you. You will discuss starting treatment.  Follow-up care is a key part of your treatment and safety. Be sure to make and go to all appointments, and call your doctor if you are having problems. It's also a good idea to know your test results and keep a list of the medicines you take.  Where can you learn more?  Go to https://www.[employer].net/patiented  Enter T792 in the search box to learn more about \"HIV Testing: Care Instructions.\"  Current as of: June 12, 2023  Content Version: 14.2 2024 [employer].   Care instructions adapted under license by your healthcare professional. If you have questions about a medical condition or this instruction, always ask your healthcare professional. Healthwise, [employer] disclaims any warranty or liability for your use of this information.    Hepatitis C Virus Tests: About These Tests  What are they?     Hepatitis C virus tests are blood tests that check for substances in the blood that show whether you have hepatitis C now or had it in the past. The tests can also tell you what type of hepatitis C you have and how severe the disease is. This can help your doctor with treatment.  If the tests show that you have long-term hepatitis C, you need to take steps to prevent spreading the disease.  Why are these tests done?  You may need these tests if:  You have symptoms of hepatitis.  You may have been exposed to the virus. You are more likely to have been exposed to the virus if you inject drugs or are exposed to body fluids (such as if you are a health care worker).  You've had other tests that show you have liver problems.  You are 18 to 79 years old.  You have an HIV infection.  The tests also are done to help your doctor decide about your treatment and see how well it works.  How do you prepare for the " "test?  In general, there's nothing you have to do before this test, unless your doctor tells you to.  How is the test done?  A health professional uses a needle to take a blood sample, usually from the arm.  What happens after these tests?  You will probably be able to go home right away.  You can go back to your usual activities right away.  Follow-up care is a key part of your treatment and safety. Be sure to make and go to all appointments, and call your doctor if you are having problems. It's also a good idea to keep a list of the medicines you take. Ask your doctor when you can expect to have your test results.  Where can you learn more?  Go to https://www.[employer]unch.net/patiented  Enter W551 in the search box to learn more about \"Hepatitis C Virus Tests: About These Tests.\"  Current as of: June 12, 2023  Content Version: 14.2 2024 [employer].   Care instructions adapted under license by your healthcare professional. If you have questions about a medical condition or this instruction, always ask your healthcare professional. Healthwise, Incorporated disclaims any warranty or liability for your use of this information.    Learning About Fetal Ultrasound Results  What is a fetal ultrasound?     Fetal ultrasound is a test that lets your doctor see an image of your baby. Your doctor learns information about your baby from this picture. You may find out, for example, if you are having a boy or a girl. But the main reason you have this test is to get information about your baby's health.  (You may hear your baby called a fetus. This is a common medical term for a baby that's growing in the mother's uterus.)  What kind of information can you learn from this test?  The findings of an ultrasound fall into two categories, normal and abnormal.  Normal  The fetus is the right size for its age.  The placenta is the expected size and does not cover the cervix.  There is enough amniotic fluid in the uterus.  No " birth defects can be seen.  Abnormal  The fetus is small or large for its age.  The placenta covers the cervix.  There is too much or too little amniotic fluid in the uterus.  The fetus may have a birth defect.  What does an abnormal result mean?  Abnormal seems to imply that something is wrong with your baby. But what it means is that the test has shown something the doctor wants to take a closer look at.  And that's what happens next. Your doctor will talk to you about what further test or tests you may need.  What do the results mean?  Some of the things your doctor may see on an abnormal ultrasound include:  Echogenic bowel.  The bowel looks very bright on the screen. This could mean that there's blood in the bowel. Or it could mean that something is blocking the small bowel.  Increased nuchal translucency.  The ultrasound measures the thickness at the back of the baby's neck. An increase in thickness is sometimes an early sign of Down syndrome.  Increased or decreased amniotic fluid.  The doctor will look for a reason for the level of amniotic fluid and will watch the pregnancy closely as it progresses.  Large ventricles.  Ventricles in the brain look larger than they should. Your doctor may take a closer look at the brain.  Renal pyelectasis/hydronephrosis.  The ultrasound measures the fluid around the kidney. If there is more fluid than expected, there is a chance of urinary tract or kidney problems.  Short long bones.  The ultrasound measures certain arm and leg bones. A long bone (humerus or femur) that is shorter than average could be a sign of Down syndrome.  Subchorionic hemorrhage.  An ultrasound can show bleeding under one of the membranes that surrounds the fetus. Some women don't have symptoms of bleeding. The ultrasound can find this problem when women are not bleeding from their vagina. Women who have this condition have a slightly higher chance of miscarriage.  What do you do now?  Take a deep  "breath, and let it out. Keep in mind that an abnormal finding on an ultrasound, after it's coupled with more information, may:  Turn out to be nothing.  Turn out to be something mild that won't affect the baby.  Turn out to be something more serious. But if this happens, early diagnosis helps you and your doctor plan treatment options sooner rather than later.  Your medical team is there for you. So are your family and friends. Ask questions, and get the help and support you need.  Follow-up care is a key part of your treatment and safety. Be sure to make and go to all appointments, and call your doctor if you are having problems. It's also a good idea to know your test results and keep a list of the medicines you take.  Where can you learn more?  Go to https://www.[employer].net/patiented  Enter K451 in the search box to learn more about \"Learning About Fetal Ultrasound Results.\"  Current as of: July 10, 2023  Content Version: 14.2 2024 [employer].   Care instructions adapted under license by your healthcare professional. If you have questions about a medical condition or this instruction, always ask your healthcare professional. Healthwise, Incorporated disclaims any warranty or liability for your use of this information.    Learning About Prenatal Visits  Overview     Regular prenatal visits are very important during any pregnancy. These quick office visits may seem simple and routine. But they can help you have a safe and healthy pregnancy. Your doctor is watching for problems that can only be found through regular checkups. The visits also give you and your doctor time to build a good relationship.  After your first visit, you will most likely start on a schedule of monthly visits. In your third trimester, the visits will get more frequent. Based on your health, your age, and if you've had a normal, full-term pregnancy before, your doctor may want to see you more or less often.  At different " times in your pregnancy, you will have exams and tests. Some are routine. Others are done only when there is a chance of a problem. Everything healthy you do for your body helps you have a healthy pregnancy. Rest when you need it. Eat well, drink plenty of water, and exercise regularly.  What happens during a prenatal visit?  You will have blood pressure checks, along with urine tests. You also may have blood tests. If you need to go to the bathroom while waiting for the doctor, tell the nurse. You will be given a sample cup so your urine can be tested.  You will be weighed and have your belly measured.  Your doctor may listen to the fetal heartbeat with a special device.  At about 24 weeks, and possibly earlier in your pregnancy, your doctor will check your blood sugar (glucose tolerance test) for diabetes that can occur during pregnancy. This is gestational diabetes, which can be harmful.  You will have tests to check for infections that could harm your . These include group B streptococcus and hepatitis B.  Your doctor may do ultrasounds to check for problems. This also checks the position of the fetus. An ultrasound uses sound waves to produce a picture of the fetus.  You may get your vaccines updated.  Your doctor may ask you questions to check for signs of anxiety or depression. Tell your doctor if you feel sad, anxious, or hopeless for more than a few days.  You may have other tests at any time during your pregnancy.  Use your visits to discuss with your doctor any concerns you have.  How can you care for yourself at home?  Get plenty of rest.  Try to exercise every day, if your doctor says it is okay. If you have not exercised in the past, start out slowly. For example, you can take short walks each day.  Choose healthy foods, such as fruits, vegetables, whole grains, lean proteins, low-fat dairy, and healthy fats.  Drink plenty of fluids. Cut down on drinks with caffeine, such as coffee, tea, and  "cola. If you have kidney, heart, or liver disease and have to limit fluids, talk with your doctor before you increase the amount of fluids you drink.  Try to avoid chemical fumes, paint fumes, and poisons.  If you smoke, vape, or use alcohol, marijuana, or other drugs, quit or cut back as much as you can. Talk to your doctor if you need help quitting.  Review all of your medicines, including over-the-counter medicines and supplements, with your doctor. Some of your routine medicines may need to be changed. Do not stop or start taking any medicines without talking to your doctor first.  Follow-up care is a key part of your treatment and safety. Be sure to make and go to all appointments, and call your doctor if you are having problems. It's also a good idea to know your test results and keep a list of the medicines you take.  Where can you learn more?  Go to https://www.[employer].net/patiented  Enter J502 in the search box to learn more about \"Learning About Prenatal Visits.\"  Current as of: July 10, 2023  Content Version: 14.2 2024 [facility].   Care instructions adapted under license by your healthcare professional. If you have questions about a medical condition or this instruction, always ask your healthcare professional. Healthwise, Incorporated disclaims any warranty or liability for your use of this information.    Intimate Partner Violence: Care Instructions  Overview     If you want to save this information but don't think it is safe to take it home, see if a trusted friend can keep it for you. Plan ahead. Know who you can call for help, and memorize the phone number.   Be careful online too. Your online activity may be seen by others. Do not use your personal computer or device to read about this topic. Use a safe computer, such as one at work, a friend's home, or a library.    Intimate partner violence--a type of domestic abuse--is different from an argument now and then. It is a pattern of " abuse that one person may use to control another person's behavior. It may start with threats and name-calling. Then, it may lead to more serious acts, like pushing and slapping. The abuse also may occur in other areas. For example, the abuser may withhold money or spend a partner's money without their knowledge.  Abuse can cause serious harm. You are more likely to have a long-term health problem from the injuries and stress of living in a violent relationship. People who are sexually abused by their partners have more sexually transmitted infections and unplanned pregnancies. Anyone who is abused also faces emotional pain. Anyone can be abused in relationships. In some relationships, both people use abusive behavior.  If you are pregnant, abuse can cause problems such as poor weight gain, infections, and bleeding. Abuse during this time may increase your baby's risk of low birth weight, premature birth, and death.  Follow-up care is a key part of your treatment and safety. Be sure to make and go to all appointments, and call your doctor if you are having problems. It's also a good idea to know your test results and keep a list of the medicines you take.  How can you care for yourself at home?  If you do not have a safe place to stay, discuss this with your doctor before you leave.  Have a plan for where to go, how to leave your home, and where to stay in case of an emergency. Do not tell your partner about your plan. Contact:  The National Domestic Violence Hotline toll-free at 1-919.898.6284. They can help you find resources in your area.  Your local police department, hospital, or clinic for information about shelters and safe homes near you.  Talk to a trusted friend or neighbor, a counselor, or a kylee leader. Do not feel that you have to hide what happened.  Teach your children how to call for help in an emergency.  Be alert to warning signs, such as threats, heavy alcohol use, or drug use. This can help you  "avoid danger.  If you can, make sure that there are no guns or other weapons in your home.  When should you call for help?   Call 911 anytime you think you may need emergency care. For example, call if:    You or someone else has just been abused.     You think you or someone else is in danger of being abused.   Watch closely for changes in your health, and be sure to contact your doctor if you have any problems.  Where can you learn more?  Go to https://www.gDecide.net/patiented  Enter G282 in the search box to learn more about \"Intimate Partner Violence: Care Instructions.\"  Current as of: June 24, 2023  Content Version: 14.2 2024 [employer].   Care instructions adapted under license by your healthcare professional. If you have questions about a medical condition or this instruction, always ask your healthcare professional. Healthwise, Incorporated disclaims any warranty or liability for your use of this information.    Intimate Partner Violence Safety Instructions: Care Instructions  Overview     If you want to save this information but don't think it is safe to take it home, see if a trusted friend can keep it for you. Plan ahead. Know who you can call for help, and memorize the phone number.   Be careful online too. Your online activity may be seen by others. Do not use your personal computer or device to read about this topic. Use a safe computer, such as one at work, a friend's home, or a library.    When you are abused by a spouse or partner, you can take actions to protect yourself and your children.  You can increase your safety whether you decide to stay with your spouse or partner or you decide to leave. You may want to make a safety plan and pack a bag ahead of time. This will help you leave quickly when you decide to. Remember, you cannot change your partner's actions, but you can find help for you and your children. No one deserves to be abused.  Follow-up care is a key part of your " treatment and safety. Be sure to make and go to all appointments, and call your doctor if you are having problems. It's also a good idea to know your test results and keep a list of the medicines you take.  How can you care for yourself at home?  Make a plan for your safety   If you decide to stay with your abusive spouse or partner, you can do the following to increase your safety:  Decide what works best to keep you safe in an emergency.  Know who you can call to help you in an emergency.  Decide if you will call the police if you get hurt again. If you can, agree on a signal with your children or neighbor to call the police for you if you need help. You can flash lights or hang something out of a window.  Choose a safe place to go for a short time if you need to leave home. Memorize the address and phone number.  Learn escape routes out of your home in case you need to leave in a hurry. Teach your children different ways to get out of your home quickly if they need to.  If you can, hide or lock up things that can be used as weapons (guns, knives, hammers).  Learn the number of a domestic violence shelter. Talk to the people there about how they can help.  Find out about other community resources that can help you.  Take pictures of bruises or other injuries if you can. You can also take pictures of things your abuser has broken.  Teach your children that violence is never okay. Tell them that they do not deserve to be hurt.  Pack a bag   Prepare a bag with things you will need if you leave suddenly. Leave it with a friend, a relative, or someone else you trust. You could include the following items in the bag:  A set of keys to your home and car.  Emergency phone numbers and addresses.  Money such as cash or checks. You can also ask a friend, a relative, or someone else you trust to hold money for you.  Copies of legal documents such as house and car titles or rent receipts, birth certificates, Social Security  "card, voter registration, marriage and 's licenses, and your children's health records.  Personal items you would need for a few days, such as clothes, a toothbrush, toothpaste, and any medicines you or your children need.  A favorite toy or book for your child or children.  Diapers and bottles, if you have very young children.  Pictures that show signs of abuse and violence. You may also add pictures of your abuser.  If you leave   If you decide to leave, you can take the following steps:  Go to the emergency room at a hospital if you have been hurt.  Think about asking the police to be with you as you leave. They can protect you as you leave your home.  If you decide to leave secretly, remember that activities can be tracked. Your abuser may still have access to your cell phone, email, and credit cards. It may be possible for these to be traced. Always be aware of your surroundings.  If this is an emergency, do not worry about gathering up anything. Just leave--your safety is most important.  If your abuser moves out, change the locks on the doors. If you have a security system, change the access code.  When should you call for help?   Call 911 anytime you think you may need emergency care. For example, call if:    You or someone else has just been abused.     You think you or someone else is in danger of being abused.   Watch closely for changes in your health, and be sure to contact your doctor if you have any problems.  Where can you learn more?  Go to https://www.Ogorod.net/patiented  Enter A752 in the search box to learn more about \"Intimate Partner Violence Safety Instructions: Care Instructions.\"  Current as of: June 24, 2023  Content Version: 14.2 2024 [employer].   Care instructions adapted under license by your healthcare professional. If you have questions about a medical condition or this instruction, always ask your healthcare professional. Healthwise, Incorporated disclaims " any warranty or liability for your use of this information.    Learning About Intimate Partner Violence  What is intimate partner violence?  Intimate partner violence is a type of domestic abuse. It's threatening, emotionally harmful, or violent behavior in a personal relationship. It can happen between past or current partners or spouses. In some relationships both people abuse each other. One partner may be more abusive. Or the abuse may be equal.  Abuse can affect people of any ethnic group, race, or Congregation. It can affect teens, adults, or the elderly. And it can happen to people of any sexual orientation, gender, or social status.  Abusers use fear, bullying, and threats to control their partners. They may control what their partners do. They may control where their partners go or who they see. They may act jealous, controlling, or possessive. These early signs of abuse may happen soon after the start of the relationship. Sometimes it can be hard to notice abuse at first. But after the relationship becomes more serious, the abuse may get worse.  If you are being abused in your relationship, it's important to get help. The abuse is not your fault. You don't have to face it alone.  Be careful  It may not be safe to take home domestic abuse information like this handout. Some people ask a trusted friend to keep it for them. It's also important to plan ahead and to memorize the phone number of places you can go for help. If you are concerned about your safety, do not use your computer, smartphone, or tablet to read about domestic abuse.   What are the types of intimate partner violence?  Abuse can happen in different ways. Each type can happen on its own or in combination with others.  Emotional abuse  Emotional abuse is a pattern of threats, insults, or controlling behavior. It includes verbal abuse. It goes beyond healthy disagreements in a relationship. It's a sign of an unhealthy relationship.  Do you feel  threatened, intimidated, or controlled?  Does your partner:  Threaten your children, other family members, or pets?  Use jokes meant to embarrass or shame you?  Call you names?  Tell you that you are a bad parent?  Threaten to take away your children?  Threaten to have you or your family members deported?  Control your access to money or other basic needs?  Control what you do, who you see or talk to, or where you go?  Another form of emotional abuse is denying that it is happening. Or the abuser may act like the abuse is no big deal or is your fault.  Sexual abuse  With sexual abuse, abusers may try to convince or force you to have sex. They may force you into sex acts you're not comfortable with. Or they may sexually assault you. Sexual abuse can happen even if you are in a committed relationship.  Physical abuse  Physical abuse means that a partner hits, kicks, or does something else to physically hurt you. Physical abuse that starts with a slap might lead to kicking, shoving, and choking over time. The abuser may also threaten to hurt or kill you.  Stalking  Stalking means that an abuser gives you attention that you do not want and that causes you fear. Examples of stalking include:  Following you.  Showing up at places where the abuser isn't invited, such as at your work or school.  Constantly calling or texting you.  What problems can  to?  Intimate partner violence can be very dangerous. It can cause serious, repeated injury. It can even lead to death.  All forms of abuse can cause long-term health problems from the stress of a violent relationship. Verbal abuse can lead to sexual and physical abuse.  Abuse causes:  Emotional pain.  Depression.  Anxiety.  Post-traumatic stress.  Sexual abuse can lead to sexually transmitted infections (such as HIV/AIDS) and unplanned pregnancy.  Pregnancy can be a very dangerous time for people in abusive relationships. Abuse can cause or increase the risk of problems  "during pregnancy. These include low weight gain, anemia, infections, and bleeding. Abuse may also increase your baby's risk of low birth weight, premature birth, and death.  It can be hard for some victims of abuse to ask for help or to leave their relationship. You may feel scared, stuck, or not sure what steps to take. But it's important not to ignore abuse. Talking to someone you trust could be the first step to ending the abuse and taking care of your own health and happiness again. There are resources available that can help keep you safe.  Where can you get help?  Talk to a trusted friend. Find a local advocacy group, or talk to your doctor about the abuse.  Contact the National Domestic Violence Hotline at 1-070-253-FIAY (1-498.460.9174) for more safety tips. They can guide you to groups in your area that can help. Or go to the National Coalition Against Domestic Violence website at www.thehoSpringr.org to learn more.  Domestic violence groups or a counselor in your area can help you make a safety plan for yourself and your children.  When to call for help  Call 911 anytime you think you may need emergency care. For example, call if:  You think that you or someone you know is in danger of being abused.  You have been hurt and can't have someone safely take you to emergency care.  You have just been abused.  A family member has just been abused.  Where can you learn more?  Go to https://www.YouEarnedIt.net/patiented  Enter S665 in the search box to learn more about \"Learning About Intimate Partner Violence.\"  Current as of: June 24, 2023  Content Version: 14.2 2024 [facility] [employer]   Care instructions adapted under license by your healthcare professional. If you have questions about a medical condition or this instruction, always ask your healthcare professional. Healthwise, Incorporated disclaims any warranty or liability for your use of this information.    Vaginal Bleeding During Pregnancy: Care " Instructions  Overview     It's common to have some vaginal spotting when you are pregnant. In some cases, the bleeding isn't serious. And there aren't any more problems with the pregnancy.  But sometimes bleeding is a sign of a more serious problem. This is more common if the bleeding is heavy or painful. Examples of more serious problems include miscarriage, an ectopic pregnancy, and a problem with the placenta.  You may have to see your doctor again to be sure everything is okay. You may also need more tests to find the cause of the bleeding.  Home treatment may be all you need. But it depends on what is causing the bleeding. Be sure to tell your doctor if you have any new symptoms or if your symptoms get worse.  The doctor has checked you carefully, but problems can develop later. If you notice any problems or new symptoms, get medical treatment right away.  Follow-up care is a key part of your treatment and safety. Be sure to make and go to all appointments, and call your doctor if you are having problems. It's also a good idea to know your test results and keep a list of the medicines you take.  How can you care for yourself at home?  If your doctor prescribed medicines, take them exactly as directed. Call your doctor if you think you are having a problem with your medicine.  Do not have vaginal sex until your doctor says it's okay.  Do not put anything in your vagina until your doctor says it's okay.  Ask your doctor about other activities you can or can't do.  Get a lot of rest. Being pregnant can make you tired.  Do not use nonsteroidal anti-inflammatory drugs (NSAIDs), such as ibuprofen (Advil, Motrin), naproxen (Aleve), or aspirin, unless your doctor says it is okay.  When should you call for help?   Call 911 anytime you think you may need emergency care. For example, call if:    You passed out (lost consciousness).     You have severe vaginal bleeding. This means you are soaking through a pad each hour  for 2 or more hours.     You have sudden, severe pain in your belly or pelvis.   Call your doctor now or seek immediate medical care if:    You have new or worse vaginal bleeding.     You are dizzy or lightheaded, or you feel like you may faint.     You have pain in your belly, pelvis, or lower back.     You think that you are in labor.     You have a sudden release of fluid from your vagina.     You've been having regular contractions for an hour. This means that you've had at least 8 contractions within 1 hour or at least 4 contractions within 20 minutes, even after you change your position and drink fluids.     You notice that your baby has stopped moving or is moving much less than normal.   Watch closely for changes in your health, and be sure to contact your doctor if you have any problems.  Current as of: July 10, 2023  Content Version: 14.2    2024 [employer].   Care instructions adapted under license by your healthcare professional. If you have questions about a medical condition or this instruction, always ask your healthcare professional. Healthwise, Incorporated disclaims any warranty or liability for your use of this information.  Weeks 10 to 14 of Your Pregnancy: Care Instructions  It's now possible to hear the fetus's heartbeat with a special ultrasound device. And the fetus's organs are developing.    Decide about tests to check for birth defects. Think about your age, your chance of passing on a family disease, your need to know about any problems, and what you might do after you have the test results.   It's okay to exercise. Try activities such as walking or swimming. Check with your doctor before starting a new program.     You may feel more tired than usual.  Taking naps during the day may help.     You may feel emotional.  It might help to talk to someone.     You may have headaches.  Try lying down and putting a cool cloth over your forehead.     You can use acetaminophen (Tylenol)  "for pain relief.  Don't take any anti-inflammatory medicines (such as Advil, Motrin, Aleve), unless your doctor says it's okay.     You may feel a fullness or aching in your lower belly.  This can feel like the kind of cramps you might get before a period. A back rub may help.     You may need to urinate more.  Your growing uterus and changing hormones can affect your bladder.     You may feel sick to your stomach (morning sickness).  Try avoiding food and smells that make you feel sick.     Your breasts may feel different.  They may feel tender or get bigger. Your nipples may get darker. Try a bra that gives you good support.     Avoid alcohol, tobacco, and drugs (including marijuana).  If you need help quitting, talk to your doctor.     Take a daily prenatal vitamin.  Choose one with folic acid.   Follow-up care is a key part of your treatment and safety. Be sure to make and go to all appointments, and call your doctor if you are having problems. It's also a good idea to know your test results and keep a list of the medicines you take.  Where can you learn more?  Go to https://www.Soum.net/patiented  Enter E090 in the search box to learn more about \"Weeks 10 to 14 of Your Pregnancy: Care Instructions.\"  Current as of: July 10, 2023  Content Version: 14.2 2024 [employer].   Care instructions adapted under license by your healthcare professional. If you have questions about a medical condition or this instruction, always ask your healthcare professional. Healthwise, Incorporated disclaims any warranty or liability for your use of this information.      Nutrition During Pregnancy: Care Instructions  Overview     Healthy eating when you are pregnant is important for you and your baby. It can help you feel well and have a successful pregnancy and delivery. During pregnancy your nutrition needs increase. Even if you have excellent eating habits, your doctor may recommend a multivitamin to make sure " you get enough iron and folic acid.  You may wonder how much weight you should gain. In general, if you were at a healthy weight before you became pregnant, then you should gain between 25 and 35 pounds. If you were overweight before pregnancy, then you'll likely be advised to gain 15 to 25 pounds. If you were underweight before pregnancy, then you'll probably be advised to gain 28 to 40 pounds. Your doctor will work with you to set a weight goal that is right for you. Gaining a healthy amount of weight helps you have a healthy baby.  Follow-up care is a key part of your treatment and safety. Be sure to make and go to all appointments, and call your doctor if you are having problems. It's also a good idea to know your test results and keep a list of the medicines you take.  How can you care for yourself at home?  Eat plenty of fruits and vegetables. Include a variety of orange, yellow, and leafy dark-green vegetables every day.  Choose whole-grain bread, cereal, and pasta. Good choices include whole wheat bread, whole wheat pasta, brown rice, and oatmeal.  Get 4 or more servings of milk and milk products each day. Good choices include nonfat or low-fat milk, yogurt, and cheese. If you cannot eat milk products, you can get calcium from calcium-fortified products such as orange juice, soy milk, and tofu. Other non-milk sources of calcium include leafy green vegetables, such as broccoli, kale, mustard greens, turnip greens, bok stuart, and brussels sprouts.  If you eat meat, pick lower-fat types. Good choices include lean cuts of meat and chicken or turkey without the skin.  Avoid fish that are high in mercury. These include shark, swordfish, viola mackerel, marlin, orange roughy, and bigeye tuna, as well as tilefish from the [place] of [place].  It's okay to eat up to 8 to 12 ounces a week of fish that are low in mercury or up to 4 ounces a week of fish that have medium levels of mercury. Some fish that are low in mercury  "are salmon, shrimp, canned light tuna, cod, and tilapia. Some fish that have medium levels of mercury are halibut and white albacore tuna.  For more advice about eating fish, you can visit the U.S. Food and Drug Administration (FDA) or U.S. Environmental Protection Agency (EPA) website.  Heat lunch meats (such as turkey, ham, or bologna) to 165 F before you eat them. This reduces your risk of getting sick from a kind of bacteria that can be found in lunch meats.  Do not eat unpasteurized soft cheeses, such as brie, feta, fresh mozzarella, and blue cheese. They have a bacteria that could harm your baby.  Limit caffeine to about 200 to 300 mg per day. On average, a cup of brewed coffee has around 80 to 100 mg of caffeine.  Do not drink any alcohol. No amount of alcohol has been found to be safe during pregnancy.  Do not diet or try to lose weight. For example, do not follow a low-carbohydrate diet. If you are overweight at the start of your pregnancy, your doctor will work with you to manage your weight gain.  Tell your doctor about all vitamins and supplements you take.  When should you call for help?  Watch closely for changes in your health, and be sure to contact your doctor if you have any problems.  Where can you learn more?  Go to https://www.[employer].net/patiented  Enter Y785 in the search box to learn more about \"Nutrition During Pregnancy: Care Instructions.\"  Current as of: September 20, 2023  Content Version: 14.2 2024 [facility] [employer].   Care instructions adapted under license by your healthcare professional. If you have questions about a medical condition or this instruction, always ask your healthcare professional. Healthwise, Incorporated disclaims any warranty or liability for your use of this information.    Exercise During Pregnancy: Care Instructions  Overview     Exercise is good for you during a healthy pregnancy. It can relieve back pain, swelling, and other discomforts. It also " prepares your muscles for childbirth. And exercise can improve your energy level and help you sleep better.  If your doctor advises it, get more exercise. For example, walking is a good choice. Bit by bit, increase the amount you walk every day. Try for at least 30 minutes on most days of the week. You could also try a prenatal exercise class. But if you do not already exercise, be sure to talk with your doctor before you start a new exercise program. Doctors do not recommend contact sports during pregnancy.  Follow-up care is a key part of your treatment and safety. Be sure to make and go to all appointments, and call your doctor if you are having problems. It's also a good idea to know your test results and keep a list of the medicines you take.  How can you care for yourself at home?  Talk with your doctor about the right kind of exercise for each stage of pregnancy.  Listen to your body to know if your exercise is at a safe level.  Do not become overheated while you exercise. High body temperature can be harmful. Avoid activities that can make your body too hot.  If you feel tired, take it easy. You might walk instead of run.  If you are used to strenuous exercise, ask your doctor how to know when it's time to slow down.  If you exercised before getting pregnant, you should be able to keep up your routine early in your pregnancy. Later in your pregnancy, you may want to switch to more gentle activities.  Drink plenty of fluids before, during, and after exercise.  Avoid contact sports, such as soccer and basketball. Also avoid risky activities. These include scuba diving, horseback riding, and exercising at a high altitude (above 6,000 feet). If you live in a place with a high altitude, talk to your doctor about how you can exercise safely.  Do not get overtired while you exercise. You should be able to talk while you work out.  After your fourth month of pregnancy, avoid exercises that require you to lie flat on  "your back on a hard surface. These include sit-ups and some yoga poses.  Get plenty of rest. You may be very tired while you are pregnant.  Where can you learn more?  Go to https://www.CFO.com.net/patiented  Enter S801 in the search box to learn more about \"Exercise During Pregnancy: Care Instructions.\"  Current as of: July 10, 2023  Content Version: 14.2 2024 [employer].   Care instructions adapted under license by your healthcare professional. If you have questions about a medical condition or this instruction, always ask your healthcare professional. Healthwise, Incorporated disclaims any warranty or liability for your use of this information.    Learning About Pregnancy When You Are Underweight or Overweight  How does your weight affect your pregnancy?    The basics of prenatal care are the same for everyone, regardless of size. You'll get what you need to have a healthy baby.  But if you are not at a weight that is healthy for you, it can make a difference in a few things. Being underweight or overweight can increase the chances of some problems during pregnancy. So your doctor or midwife will pay close attention to your health and your baby's health. You may have some extra doctor or midwife visits and tests. And you may have some tests earlier in your pregnancy.  Work with your doctor or midwife to get the care you need. Go to all your doctor or midwife visits, and follow their advice about what to do and what to avoid during pregnancy.  How much weight gain is healthy during pregnancy?  There's no fixed number of pounds that you should be aiming for. Instead, there's a range of weight gain that's good for you and your baby. Based on your weight before pregnancy, experts say it's generally best to gain about:  to if you're underweight.  to if you're at a healthy weight.  to if you're overweight.  to if you're very overweight (obese). In some cases, a doctor may recommend that you don't gain " "any weight.  If you have questions about weight gain during pregnancy, talk with your doctor about what's right for you. Gaining a healthy amount of weight helps you have a healthy pregnancy.  How much extra food do you need to eat?  How much food you need to eat during pregnancy depends on:  Your height.  How much you weigh when you get pregnant.  How active you are.  If you're carrying more than one fetus (multiple pregnancy).  In the first trimester, you'll probably need the same amount of calories as you did before you were pregnant. In general, in your second trimester, you need to eat about 340 extra calories a day. In your third trimester, you need to eat about 450 extra calories a day.  What can you do to have a healthy pregnancy?  The best things you can do for you and your baby are to eat healthy foods, get regular exercise, avoid alcohol and smoking, and go to your doctor or midwife visits.  Eat a variety of foods from all the food groups. Make sure to get enough calcium and folic acid. Ask your doctor or midwife how much folic acid you should be taking.  You may want to work with a dietitian to help you plan healthy meals to get the right amount of calories for you.  Talk to your doctor or midwife about how you can exercise safely. If you didn't exercise much before you got pregnant, talk to your doctor or midwife about how you can slowly get more active. They may want to set up an exercise program with you.  Where can you learn more?  Go to https://www.Depositphotos.net/patiented  Enter B644 in the search box to learn more about \"Learning About Pregnancy When You Are Underweight or Overweight.\"  Current as of: July 10, 2023  Content Version: 14.2 2024 [facility] [employer].   Care instructions adapted under license by your healthcare professional. If you have questions about a medical condition or this instruction, always ask your healthcare professional. [employer] disclaims any warranty " or liability for your use of this information.    You have been provided the CDC Warning Signs in Pregnancy document.    Additional copies can be found here: www.Editorially.com/469239.pdf

## 2024-12-03 NOTE — PROGRESS NOTES
"SHEBA RN Prenatal Intake note  Subjective     29 year old female newly pregnant.  LMP: 10/17/24 (exact).     Pregnancy is unplanned but coping OK has anxiety based on previous    Partner/support person name and relationship - aris Briseno.        Symptoms since LMP include nausea and anxiety. Patient has tried these relief measures:none    OB HISTORY  OB History    Para Term  AB Living   3 2 2 0 0 2   SAB IAB Ectopic Multiple Live Births   0 0 0 0 2      # Outcome Date GA Lbr Layo/2nd Weight Sex Type Anes PTL Lv   3 Current            2 Term 24 39w3d  3.97 kg (8 lb 12 oz) F CS-LTranv Gen N BOAZ      Complications: Breech presentation, single or unspecified fetus      Name: Sherry Hunter      Apgar1: 8  Apgar5: 9   1 Term 22 39w3d  3.81 kg (8 lb 6.4 oz) M CS-Unspec   BOAZ      Birth Comments: Transfer from [facility] at 5 cm with GHTN.  IV fentanyl for pain. Progressed slowly. SVE 8cm. AROM forebag.  Pt frustrated with minmal change and exhaustion requesting a C-birth. Offered an epidural, pt declined. C-birth for failure to dilate. .      Name: Aníbal      Apgar1: 8  Apgar5: 9      Obstetric Comments   HTN,  mood disorder, and    Hx of CS, desires TOLAC.    22, CS 39/3 weeks, 8lbs 6.4 oz, boy, \"Aníbal\" arrest of dilation at 8cm/90%/-1, gestational hypertension - 2 blood pressures elevatated during admission 4 hours apart, normotensive postpartum, NL PIH, BMI 42, transfer from [place] due to GHTN in labor. Started her care at [facility] until 30 weeks   2nd baby was breech after starting induction          OB COMPLICATIONS  HTN,  mood disorder, during preg or PP, and     PERSONAL/SOCIAL HISTORY  *updated all tabs in history  partnered and lives with their family.  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  Job involves light activity .  Her partner works as a baker.     MENTAL HEALTH HISTORY:  past  mood disorder, " anxiety, depression, and other mental health dx PTSD, ODD, personality disorder    - Current Medications    Current Outpatient Medications   Medication Sig Dispense Refill    fish oil-omega-3 fatty acids 1000 MG capsule       Prenatal MV-Min-Fe Fum-FA-DHA (PRENATAL MULTIVITAMIN + DHA) 28-0.8 & 200 MG MISC       acyclovir (ZOVIRAX) 5 % external cream Apply topically 5 times daily. 5 g 3    acyclovir (ZOVIRAX) 5 % external ointment Apply topically 6 times daily. 15 g 3    Omega-3 Fish Oil 500 MG capsule Take 2 capsules (1,000 mg) by mouth daily 90 capsule 4           - Co-morbids    Past Medical History:   Diagnosis Date    ADHD (attention deficit hyperactivity disorder) 09/20/2012    resolved    Anxiety 12/18/2017    improved    Carrier of group B Streptococcus     Chlamydia 2013    confidential    Chronic post-traumatic stress disorder (PTSD) 09/13/2018    Cluster B personality disorder (H) 12/29/2011    Per psych discharge summary dated 12/19/11     Depressive disorder     Genital HSV 2015    positive serology; confidential    Gestational hypertension, antepartum 06/30/2022    Gonorrhea 2013    confidential    Oppositional defiant disorder of childhood or adolescence     resolved    PCOS (polycystic ovarian syndrome) 09/27/2017    Postpartum depression        - Genetic/Infection questionnaire completed, risks include none. Discussed genetic screening options, patient unsure, needs more info on first trimester genetic screening  Pt  declined to answer if she has had  a recent known exposure to Parvo or CMV so IgG/IgM testingpt declined to answer be ordered.   Flu Vaccine: Patient declined, will consider next visit  COVID Vaccine: declines COVID vaccines  Last pap smear: 2/2021  - Discussed expectations for routine prenatal care and scheduling  - Reviewed CNM and MD team - patient plans to have care  patient declined to answer  - Reviewed use of triage RN team for urgent symptom review (along with contacting after  hours provider), and use of [employer] messaging for non-urgent questions, concerns or requests  - Patient was encouraged to start prenatal vitamins as tolerated, if experiencing nausea and vomiting then OK to switch to folic acid only at this time  -Reconciled and reviewed problem list  -Pt scheduled for dating US and NOB on 12/12 and 12/23    -Additional items: patient declined to answer    Prema Granado RN

## 2024-12-03 NOTE — LETTER
"12/3/2024       RE: Mi Salgado  [address] [place] [address]     Dear Colleague,    Thank you for referring your patient, Mi Salgado, to the [facility] [place] [facility]. Please see a copy of my visit note below.    SHEBA RN Prenatal Intake note  Subjective     29 year old female newly pregnant.  LMP: 10/17/24 (exact).     Pregnancy is unplanned but coping OK has anxiety based on previous    Partner/support person name and relationship - aris Briseno.        Symptoms since LMP include nausea and anxiety. Patient has tried these relief measures:none    OB HISTORY  OB History    Para Term  AB Living   3 2 2 0 0 2   SAB IAB Ectopic Multiple Live Births   0 0 0 0 2      # Outcome Date GA Lbr Layo/2nd Weight Sex Type Anes PTL Lv   3 Current            2 Term 23 39w3d  3.97 kg (8 lb 12 oz) F CS-LTranv Gen N BOAZ      Complications: Breech presentation, single or unspecified fetus      Name: Sherry Harrison      Apgar1: 8  Apgar5: 9   1 Term 22 39w3d  3.81 kg (8 lb 6.4 oz) M CS-Unspec   BOAZ      Birth Comments: Transfer from [facility] at 5 cm with GHTN.  IV fentanyl for pain. Progressed slowly. SVE 8cm. AROM forebag.  Pt frustrated with minmal change and exhaustion requesting a C-birth. Offered an epidural, pt declined. C-birth for failure to dilate. .      Name: Aníbal      Apgar1: 8  Apgar5: 9      Obstetric Comments   HTN,  mood disorder, and    Hx of CS, desires TOLAC.    23, CS 39/3 weeks, 8lbs 6.4 oz, boy, \"Aníbal\" arrest of dilation at 8cm/90%/-1, gestational hypertension - 2 blood pressures elevatated during admission 4 hours apart, normotensive postpartum, NL PIH, BMI 42, transfer from [place] due to GHTN in labor. Started her care at [facility] until 30 weeks   2nd baby was breech after starting induction          OB COMPLICATIONS  HTN,  mood " disorder, during preg or PP, and     PERSONAL/SOCIAL HISTORY  *updated all tabs in history  partnered and lives with their family.  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  Job involves light activity .  Her partner works as a baker.     MENTAL HEALTH HISTORY:  past  mood disorder, anxiety, depression, and other mental health dx PTSD, ODD, personality disorder    - Current Medications    Current Outpatient Medications   Medication Sig Dispense Refill     fish oil-omega-3 fatty acids 1000 MG capsule        Prenatal MV-Min-Fe Fum-FA-DHA (PRENATAL MULTIVITAMIN + DHA) 28-0.8 & 200 MG MISC        acyclovir (ZOVIRAX) 5 % external cream Apply topically 5 times daily. 5 g 3     acyclovir (ZOVIRAX) 5 % external ointment Apply topically 6 times daily. 15 g 3     Omega-3 Fish Oil 500 MG capsule Take 2 capsules (1,000 mg) by mouth daily 90 capsule 4           - Co-morbids    Past Medical History:   Diagnosis Date     ADHD (attention deficit hyperactivity disorder) 2012    resolved     Anxiety 2017    improved     Carrier of group B Streptococcus      Chlamydia     confidential     Chronic post-traumatic stress disorder (PTSD) 2018     Cluster B personality disorder (H) 2011    Per psych discharge summary dated 11      Depressive disorder      Genital HSV     positive serology; confidential     Gestational hypertension, antepartum 2022     Gonorrhea     confidential     Oppositional defiant disorder of childhood or adolescence     resolved     PCOS (polycystic ovarian syndrome) 2017     Postpartum depression        - Genetic/Infection questionnaire completed, risks include none. Discussed genetic screening options, patient unsure, needs more info on first trimester genetic screening  Pt  declined to answer if she has had  a recent known exposure to Parvo or CMV so IgG/IgM testingpt declined to answer be ordered.   Flu Vaccine: Patient  declined, will consider next visit  COVID Vaccine: declines COVID vaccines  Last pap smear: 2/2021  - Discussed expectations for routine prenatal care and scheduling  - Reviewed CNM and MD team - patient plans to have care  patient declined to answer  - Reviewed use of triage RN team for urgent symptom review (along with contacting after hours provider), and use of [employer] messaging for non-urgent questions, concerns or requests  - Patient was encouraged to start prenatal vitamins as tolerated, if experiencing nausea and vomiting then OK to switch to folic acid only at this time  -Reconciled and reviewed problem list  -Pt scheduled for dating US and NOB on 12/12 and 12/23    -Additional items: patient declined to answer    Prema Granado RN      Again, thank you for allowing me to participate in the care of your patient.      Sincerely,    [facility] NURSE EDUCATION

## 2024-12-06 NOTE — PROGRESS NOTES
Mi is a 26 year old female,  with IUP at 18.4 weeks.  She had sex a couple of days ago and then she developed RLQ pain.  She describes it as a dull achy pain, worse with movements and with getting out of the car.  She has not noted any aggravating or alleviating factors.  She has not had any bleeding and no rupture of membranes.   Her pregnancy is complicated, so far, with a posterior placenta and placenta previa.  We reviewed the [facility] ultrasound results and recommendations.      The medical history, social history and family history reviewed and no updates at this time.     Review of Systems:  10 point ROS of systems including Constitutional, Eyes, Respiratory, Cardiovascular, Gastroenterology, Genitourinary, Integumentary, Muscularskeletal, Psychiatric were all negative except for pertinent positives noted in my HPI and in the PMH.    Exam  /77 (BP Location: Right arm, Cuff Size: Adult Regular)   Pulse 81   Temp 98  F (36.7  C) (Oral)   Wt 97.1 kg (214 lb)   LMP 2021 (Exact Date)   SpO2 99%   BMI 34.80 kg/m    General:  WNWD female, NAD  Alert  Oriented x 3  Gait:  Normal  Skin:  Normal skin turgor  HEENT:  NC/AT, EOMI  Abdomen:  Slight tenderness along the right inguinal canal, non-distended. FHT's heard.   Pelvic exam:  Not performed.   Extremities:  No clubbing, no cyanosis and no edema.    Assessment  Round ligament pain  Posterior placenta, placenta previa     Plan  Reassurance given regarding the round ligament pain.   Pelvic rest is strongly advised due to the placental location.     COVID vaccine discussed and the adverse outcomes that may occur with COVID infection during pregnancy. ACOG as source for information given so she may explore this more on her own.   RTC 4 weeks.  Questions seem to be answered.    Yevgeniy Porter MD        Patient

## 2024-12-12 ENCOUNTER — ANCILLARY PROCEDURE (OUTPATIENT)
Dept: ULTRASOUND IMAGING | Facility: CLINIC | Age: 29
End: 2024-12-12
Attending: OBSTETRICS & GYNECOLOGY
Payer: COMMERCIAL

## 2024-12-12 DIAGNOSIS — Z32.01 PREGNANCY TEST POSITIVE: ICD-10-CM

## 2024-12-12 PROCEDURE — 76817 TRANSVAGINAL US OBSTETRIC: CPT

## 2024-12-12 PROCEDURE — 76817 TRANSVAGINAL US OBSTETRIC: CPT | Mod: 26 | Performed by: OBSTETRICS & GYNECOLOGY

## 2024-12-22 PROBLEM — O99.820 GBS (GROUP B STREPTOCOCCUS CARRIER), +RV CULTURE, CURRENTLY PREGNANT: Status: RESOLVED | Noted: 2023-11-07 | Resolved: 2024-12-22

## 2024-12-22 PROBLEM — O40.9XX0 POLYHYDRAMNIOS AFFECTING PREGNANCY: Status: RESOLVED | Noted: 2023-10-03 | Resolved: 2024-12-22

## 2024-12-22 PROBLEM — O09.90 HIGH-RISK PREGNANCY: Status: ACTIVE | Noted: 2024-12-03

## 2025-01-06 ENCOUNTER — PATIENT OUTREACH (OUTPATIENT)
Dept: FAMILY MEDICINE | Facility: CLINIC | Age: 30
End: 2025-01-06
Payer: COMMERCIAL

## 2025-01-06 NOTE — TELEPHONE ENCOUNTER
Patient Quality Outreach    Patient is due for the following:   Physical     Action(s) Taken:   Schedule a Adult Preventative    Type of outreach:    Sent letter.    Questions for provider review:    None           Armani Stewart, CMA

## 2025-01-06 NOTE — LETTER
January 6, 2025      Mi Salgado  [address] [place] [address]      Your healthcare team cares about your health. To provide you with the best care, we have reviewed your chart and based on our findings, we see that you are due to:     PREVENTATIVE VISIT: Physical with pap    If you have already completed these items, please contact the clinic via phone or Mychart so your care team can review and update your records.  Thank you for choosing [facility] for your healthcare needs. For any questions, concerns, or to schedule an appointment please contact the clinic.     Healthy Regards,    Your [facility] Care Team

## 2025-01-11 ENCOUNTER — OFFICE VISIT (OUTPATIENT)
Dept: URGENT CARE | Facility: URGENT CARE | Age: 30
End: 2025-01-11
Payer: COMMERCIAL

## 2025-01-11 ENCOUNTER — NURSE TRIAGE (OUTPATIENT)
Dept: NURSING | Facility: CLINIC | Age: 30
End: 2025-01-11
Payer: COMMERCIAL

## 2025-01-11 VITALS
SYSTOLIC BLOOD PRESSURE: 109 MMHG | HEART RATE: 119 BPM | TEMPERATURE: 97.4 F | DIASTOLIC BLOOD PRESSURE: 69 MMHG | OXYGEN SATURATION: 100 % | RESPIRATION RATE: 28 BRPM

## 2025-01-11 DIAGNOSIS — Z33.1 PREGNANT STATE, INCIDENTAL: ICD-10-CM

## 2025-01-11 DIAGNOSIS — K08.89 PAIN, DENTAL: Primary | ICD-10-CM

## 2025-01-11 PROCEDURE — 99214 OFFICE O/P EST MOD 30 MIN: CPT | Performed by: PHYSICIAN ASSISTANT

## 2025-01-11 RX ORDER — AMOXICILLIN 875 MG/1
875 TABLET, COATED ORAL 2 TIMES DAILY
Qty: 14 TABLET | Refills: 0 | Status: SHIPPED | OUTPATIENT
Start: 2025-01-11 | End: 2025-01-18

## 2025-01-11 RX ORDER — ACETAMINOPHEN 160 MG
2000 TABLET,DISINTEGRATING ORAL
COMMUNITY
Start: 2024-12-03

## 2025-01-11 NOTE — TELEPHONE ENCOUNTER
"Currently pregnant.  Has had tooth pain during previous pregnancies.  Severe tooth pain on and off since October when filling placed.  Last couple days has had consistent pain. Tylenol and ice not helping.  Patient believes there is an infection  Saw Dentist Thursday. X-ray and was told no   Infection seen.  Per the protocol, I recommended being seen in  within four hours.  \"What are they going to do?\"  Stated she was seen before in  for same and nothing was done.  She then asked if she can have a virtual visit.  I transferred her to scheduling.      Reason for Disposition   [1] SEVERE pain (e.g., excruciating, unable to eat, unable to do any normal activities) AND [2] not improved 2 hours after pain medicine    Additional Information   Negative: Shock suspected (e.g., cold/pale/clammy skin, too weak to stand, low BP, rapid pulse)   Negative: [1] Similar pain previously AND [2] it was from \"heart attack\"   Negative: [1] Similar pain previously AND [2] it was from \"angina\" AND [3] not relieved by nitroglycerin   Negative: Sounds like a life-threatening emergency to the triager   Negative: Chest pain   Negative: Toothache followed tooth injury   Negative: Toothache or mouth pain after tooth extraction   Negative: Canker sore (i.e., aphthous ulcer)   Negative: Tongue is very swollen and tender   Negative: [1] Face is swollen AND [2] fever   Negative: Patient sounds very sick or weak to the triager    Protocols used: Toothache-A-  Leidy MAURICIO RN [facility]     "

## 2025-01-12 ENCOUNTER — TELEPHONE (OUTPATIENT)
Dept: FAMILY MEDICINE | Facility: CLINIC | Age: 30
End: 2025-01-12
Payer: COMMERCIAL

## 2025-01-12 NOTE — PATIENT INSTRUCTIONS
1) Take antibiotic as prescribed. Take this medication with food to avoid stomach upset.   2) Take Tylenol 1000 mg every 8 hours as needed for fever or pain.  3) Follow up with Dentistry on Monday for further direction, since the topical pain reliever is not managing your pain well.

## 2025-01-12 NOTE — TELEPHONE ENCOUNTER
"Writer called [facility] in [place], [place] and spoke with staff if able to fill amoxillicin script from another [facility], staff states they are able to and will take about 30 mins to fill and ready for . Writer called and spoke to patient. Writer informed patient of abx sent to patient's preferred pharmacy and should be ready within about 30 mins. Patient understood and states frustration as [facility] on [address] was not a 24 hour pharmacy and \"wasted time going back unable to get medications for nothing\". Writer reassured patient, [facility] is open today for  along with drive thru. No further questions.    Niki Quesada MA on 01/12/2025 at 10:06 AM  "
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
## 2025-01-12 NOTE — TELEPHONE ENCOUNTER
Medication Question or Refill        What medication are you calling about (include dose and sig)?: AMIXOICILLIN     Preferred Pharmacy:   [id number] - [place], [place] - [address] AT [facility]  [address] [place] [address]  Phone: [phone] Fax: [phone]    [facility] [address]   [address] [facility] [address]  Phone: [phone] Fax: [phone]    [id number] - [place], [place] - [address] AT [facility] [address]  [address]  [place] [address]  Phone: [phone] Fax: [phone]      Controlled Substance Agreement on file:   CSA -- Patient Level:    CSA: None found at the patient level.       Who prescribed the medication?: DR. RON    Do you need a refill? No    When did you use the medication last? N/A    Patient offered an appointment? No    Do you have any questions or concerns?  Yes: PT WOULD LIKE MEDICATION TO BE SENT TO A DIFFERENT PHARMACY BC THE PHARMACY IT WAS SENT TO WAS NOT OPEN 24/7. [facility]      Could we send this information to you in [facility] or would you prefer to receive a phone call?:   Patient would prefer a phone call   Okay to leave a detailed message?: Yes at Cell number on file:    Telephone Information:   Mobile [phone]

## 2025-03-16 ENCOUNTER — VIRTUAL VISIT (OUTPATIENT)
Dept: URGENT CARE | Facility: CLINIC | Age: 30
End: 2025-03-16
Payer: COMMERCIAL

## 2025-03-16 DIAGNOSIS — Z33.1 PREGNANT STATE, INCIDENTAL: ICD-10-CM

## 2025-03-16 DIAGNOSIS — J02.9 SORE THROAT: Primary | ICD-10-CM

## 2025-03-16 PROCEDURE — 98005 SYNCH AUDIO-VIDEO EST LOW 20: CPT
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
## 2025-04-06 ENCOUNTER — OFFICE VISIT (OUTPATIENT)
Dept: URGENT CARE | Facility: URGENT CARE | Age: 30
End: 2025-04-06
Payer: COMMERCIAL

## 2025-04-06 VITALS
SYSTOLIC BLOOD PRESSURE: 104 MMHG | OXYGEN SATURATION: 95 % | RESPIRATION RATE: 16 BRPM | HEART RATE: 76 BPM | TEMPERATURE: 97.9 F | DIASTOLIC BLOOD PRESSURE: 65 MMHG

## 2025-04-06 DIAGNOSIS — J01.40 ACUTE NON-RECURRENT PANSINUSITIS: Primary | ICD-10-CM

## 2025-04-06 PROCEDURE — 99213 OFFICE O/P EST LOW 20 MIN: CPT | Performed by: PHYSICIAN ASSISTANT

## 2025-04-06 PROCEDURE — 3074F SYST BP LT 130 MM HG: CPT | Performed by: PHYSICIAN ASSISTANT

## 2025-04-06 PROCEDURE — 3078F DIAST BP <80 MM HG: CPT | Performed by: PHYSICIAN ASSISTANT

## 2025-04-06 RX ORDER — AMOXICILLIN 875 MG/1
875 TABLET, COATED ORAL 2 TIMES DAILY
Qty: 20 TABLET | Refills: 0 | Status: SHIPPED | OUTPATIENT
Start: 2025-04-06 | End: 2025-04-16

## 2025-04-06 NOTE — PROGRESS NOTES
Acute non-recurrent pansinusitis  - amoxicillin (AMOXIL) 875 MG tablet; Take 1 tablet (875 mg) by mouth 2 times daily for 10 days.    General Tips for All Ages:    Nasal Irrigation:  Why: Clears nasal passages and reduces congestion.  What to Do:  Use a saline nasal spray or a neti pot to rinse your nasal passages.    Warm Compress:  Why: Eases sinus pain and promotes drainage.  What to Do:  Apply a warm compress over your sinuses for relief.    Stay Hydrated:  Why: Hydration helps thin mucus.  What to Do:  Drink plenty of water, herbal teas, or clear broths.    For Children (Under 12 Years):    OTC Saline Nasal Drops:  Why: Clears nasal passages gently.  What to Do:  Administer saline drops as directed by your child's pediatrician.    OTC Acetaminophen or Ibuprofen (if approved by pediatrician):  Why: Manages pain and reduces fever.  What to Do:  Give the recommended dose as per your child's pediatrician.    For Adolescents (12-17 Years):    OTC Decongestants (if approved by healthcare provider):  Why: Helps reduce nasal congestion.  What to Do:  Use decongestant medications cautiously, following your healthcare provider's advice.    Stay Active:  Why: Physical activity can aid sinus drainage.  What to Do:  Engage in light exercise as tolerated.    For Adults (18 Years and Older):    OTC Nasal Decongestant Sprays (use for a short duration):  Why: Provides quick relief for nasal congestion.  What to Do:  Use as directed on the package and for a limited time to avoid rebound congestion.    OTC Pain Relievers (Acetaminophen or Ibuprofen):  Why: Manages pain and reduces inflammation.  What to Do:  Take the recommended dose as directed.    All Ages:    Humidifier Use:  Why: Adds moisture to the air, easing congestion.  What to Do:  Use a humidifier in your room, especially while sleeping.    Elevate Your Head:  Why: Helps with sinus drainage.  What to Do:  Use an extra pillow to elevate your head while  sleeping.    Avoid Irritants:  Why: Prevents worsening of symptoms.  What to Do:  Stay away from tobacco smoke and other irritants.    Warm Salt Gargle:  Why: Soothes a sore throat.  What to Do:  Gargle with warm salt water several times a day.    Rest and Relaxation:  Why: Aids in recovery.  What to Do:  Get adequate rest to allow your body to heal.    Seek Medical Attention:    Patient advised to return to clinic for reevaluation (either UC or PCP) if symptoms do not improve in 7 days. Patient educated on red flag symptoms and asked to go directly to the ED if these symptoms present themselves.     Remember, each case is unique, and it's essential to tailor these recommendations to your specific situation. If you have concerns or need further guidance, don't hesitate to reach out to your healthcare provider.    Wishing you a speedy recovery!      [facility]    Subjective   29 year old who presents to clinic today for the following health issues:    Nasal Congestion       HPI     Acute Illness  Acute illness concerns: Ears are also plugged, feels like congestion is in the chest also for 1.5 weeks  Has not been able to taste or smell since Monday, but covid was neg on Thursday  Onset/Duration: 1.5weeks  Symptoms:  Fever: No  Chills/Sweats: YES  Headache (location?): YES  Sinus Pressure: YES  Conjunctivitis:  No  Ear Pain: YES: bilateral  Rhinorrhea: No  Congestion: YES  Sore Throat: No  Cough: YES  Wheeze: No but having some shortness of breath   Decreased Appetite: No  Nausea: No  Vomiting: No  Diarrhea: No  Dysuria/Freq.: No  Dysuria or Hematuria: No  Fatigue/Achiness: Fatigue   Sick/Strep Exposure: Covid-19 test was negative   Therapies tried and outcome: Mucinex     Review of Systems   Review of Systems   See HPI    Objective    Temp: 97.9  F (36.6  C) Temp src: Oral BP: 104/65 Pulse: 76   Resp: 16 SpO2: 95 %       Physical Exam   Physical Exam  Constitutional:        General: She is not in acute distress.     Appearance: Normal appearance. She is normal weight. She is not ill-appearing, toxic-appearing or diaphoretic.   HENT:      Head: Normocephalic and atraumatic.      Right Ear: Tympanic membrane, ear canal and external ear normal. There is no impacted cerumen.      Left Ear: Tympanic membrane, ear canal and external ear normal. There is no impacted cerumen.      Nose: Congestion and rhinorrhea present.      Right Sinus: Maxillary sinus tenderness and frontal sinus tenderness present.      Left Sinus: Maxillary sinus tenderness and frontal sinus tenderness present.      Mouth/Throat:      Mouth: Mucous membranes are moist.      Pharynx: No oropharyngeal exudate or posterior oropharyngeal erythema.   Cardiovascular:      Rate and Rhythm: Normal rate and regular rhythm.      Pulses: Normal pulses.      Heart sounds: Normal heart sounds. No murmur heard.     No friction rub. No gallop.   Pulmonary:      Effort: Pulmonary effort is normal. No respiratory distress.      Breath sounds: No stridor. No wheezing, rhonchi or rales.   Chest:      Chest wall: No tenderness.   Lymphadenopathy:      Cervical: No cervical adenopathy.   Neurological:      General: No focal deficit present.      Mental Status: She is alert and oriented to person, place, and time. Mental status is at baseline.      Gait: Gait normal.   Psychiatric:         Mood and Affect: Mood normal.         Behavior: Behavior normal.         Thought Content: Thought content normal.         Judgment: Judgment normal.          No results found for this or any previous visit (from the past 24 hours).

## 2025-05-11 ENCOUNTER — HEALTH MAINTENANCE LETTER (OUTPATIENT)
Age: 30
End: 2025-05-11

## 2025-06-18 ENCOUNTER — PATIENT OUTREACH (OUTPATIENT)
Dept: FAMILY MEDICINE | Facility: CLINIC | Age: 30
End: 2025-06-18
Payer: COMMERCIAL

## 2025-06-18 NOTE — TELEPHONE ENCOUNTER
Patient Quality Outreach    Patient is due for the following:   Physical     Action(s) Taken:   Schedule a office visit for Physical    Type of outreach:    Sent [employer] message.    Questions for provider review:    None         Armani Stewart [facility]  Chart routed to None.

## 2025-07-02 RX ORDER — CHLORAL HYDRATE 500 MG
1 CAPSULE ORAL DAILY
Qty: 45 CAPSULE | OUTPATIENT
Start: 2025-07-02

## 2025-07-02 NOTE — TELEPHONE ENCOUNTER
Received refill request for fish oil. Pt last seen in clinic 1/5/24. Patient is receiving OB care at [facility] for prenatal care. Rx Denied. Patient should contact [facility] for prescriptions as she is receiving prenatal care through them .

## (undated) DEVICE — CATH TRAY FOLEY 16FR BARDEX W/DRAIN BAG STATLOCK 300316A

## (undated) DEVICE — SU VICRYL 0 CT-1 36" J346H

## (undated) DEVICE — GLOVE PROTEXIS BLUE W/NEU-THERA 6.5  2D73EB65

## (undated) DEVICE — SOL NACL 0.9% IRRIG 1000ML BOTTLE 07138-09

## (undated) DEVICE — PACK C-SECTION LF PL15OTA83B

## (undated) DEVICE — STOCKING SLEEVE COMPRESSION CALF LG

## (undated) DEVICE — GLOVE ESTEEM POWDER FREE SMT 6.5  2D72PT65

## (undated) DEVICE — SOL WATER IRRIG 1000ML BOTTLE 07139-09

## (undated) DEVICE — PREP CHLORAPREP 26ML TINTED ORANGE  260815

## (undated) DEVICE — SU MONOCRYL 4-0 PS-2 18" UND Y496G

## (undated) DEVICE — STRAP KNEE/BODY 31143004

## (undated) DEVICE — DRSG STERI STRIP 1/2X4" R1547

## (undated) DEVICE — SU MONOCRYL 0 CT-1 36" Y346H

## (undated) RX ORDER — MORPHINE SULFATE 1 MG/ML
INJECTION, SOLUTION EPIDURAL; INTRATHECAL; INTRAVENOUS
Status: DISPENSED
Start: 2023-11-19

## (undated) RX ORDER — GLYCOPYRROLATE 0.2 MG/ML
INJECTION, SOLUTION INTRAMUSCULAR; INTRAVENOUS
Status: DISPENSED
Start: 2023-11-19

## (undated) RX ORDER — HYDROMORPHONE HCL IN WATER/PF 6 MG/30 ML
PATIENT CONTROLLED ANALGESIA SYRINGE INTRAVENOUS
Status: DISPENSED
Start: 2023-11-19

## (undated) RX ORDER — PROPOFOL 10 MG/ML
INJECTION, EMULSION INTRAVENOUS
Status: DISPENSED
Start: 2023-11-19

## (undated) RX ORDER — PHENYLEPHRINE HCL IN 0.9% NACL 50MG/250ML
PLASTIC BAG, INJECTION (ML) INTRAVENOUS
Status: DISPENSED
Start: 2023-11-19

## (undated) RX ORDER — FENTANYL CITRATE 50 UG/ML
INJECTION, SOLUTION INTRAMUSCULAR; INTRAVENOUS
Status: DISPENSED
Start: 2023-11-19

## (undated) RX ORDER — BUPIVACAINE HYDROCHLORIDE 2.5 MG/ML
INJECTION, SOLUTION EPIDURAL; INFILTRATION; INTRACAUDAL
Status: DISPENSED
Start: 2023-11-19

## (undated) RX ORDER — OXYTOCIN/0.9 % SODIUM CHLORIDE 30/500 ML
PLASTIC BAG, INJECTION (ML) INTRAVENOUS
Status: DISPENSED
Start: 2023-11-19

## (undated) RX ORDER — HYDROMORPHONE HYDROCHLORIDE 1 MG/ML
INJECTION, SOLUTION INTRAMUSCULAR; INTRAVENOUS; SUBCUTANEOUS
Status: DISPENSED
Start: 2023-11-19